# Patient Record
Sex: FEMALE | Race: OTHER | HISPANIC OR LATINO | Employment: PART TIME | URBAN - METROPOLITAN AREA
[De-identification: names, ages, dates, MRNs, and addresses within clinical notes are randomized per-mention and may not be internally consistent; named-entity substitution may affect disease eponyms.]

---

## 2017-06-13 ENCOUNTER — APPOINTMENT (OUTPATIENT)
Dept: LAB | Age: 32
End: 2017-06-13
Attending: PREVENTIVE MEDICINE

## 2017-06-13 ENCOUNTER — TRANSCRIBE ORDERS (OUTPATIENT)
Dept: ADMINISTRATIVE | Age: 32
End: 2017-06-13

## 2017-06-13 DIAGNOSIS — Z00.00 ROUTINE GENERAL MEDICAL EXAMINATION AT A HEALTH CARE FACILITY: ICD-10-CM

## 2017-06-13 DIAGNOSIS — Z00.00 ROUTINE GENERAL MEDICAL EXAMINATION AT A HEALTH CARE FACILITY: Primary | ICD-10-CM

## 2017-06-13 PROCEDURE — 86787 VARICELLA-ZOSTER ANTIBODY: CPT

## 2017-06-13 PROCEDURE — 86480 TB TEST CELL IMMUN MEASURE: CPT

## 2017-06-13 PROCEDURE — 86735 MUMPS ANTIBODY: CPT

## 2017-06-13 PROCEDURE — 86762 RUBELLA ANTIBODY: CPT

## 2017-06-13 PROCEDURE — 36415 COLL VENOUS BLD VENIPUNCTURE: CPT

## 2017-06-13 PROCEDURE — 86765 RUBEOLA ANTIBODY: CPT

## 2017-06-14 LAB — RUBV IGG SERPL IA-ACNC: 69.8 IU/ML

## 2017-06-15 LAB
ANNOTATION COMMENT IMP: NORMAL
GAMMA INTERFERON BACKGROUND BLD IA-ACNC: 0.06 IU/ML
M TB IFN-G BLD-IMP: NEGATIVE
M TB IFN-G CD4+ BCKGRND COR BLD-ACNC: 0 IU/ML
M TB IFN-G CD4+ T-CELLS BLD-ACNC: 0.06 IU/ML
MEV IGG SER QL: ABNORMAL
MITOGEN IGNF BLD-ACNC: 8.28 IU/ML
MUV IGG SER QL: NORMAL
QUANTIFERON-TB GOLD IN TUBE: NORMAL
SERVICE CMNT-IMP: NORMAL
VZV IGG SER IA-ACNC: NORMAL

## 2017-08-17 ENCOUNTER — TRANSCRIBE ORDERS (OUTPATIENT)
Dept: LAB | Facility: CLINIC | Age: 32
End: 2017-08-17

## 2017-08-17 ENCOUNTER — APPOINTMENT (OUTPATIENT)
Dept: LAB | Facility: CLINIC | Age: 32
End: 2017-08-17
Payer: COMMERCIAL

## 2017-08-17 DIAGNOSIS — Z00.8 HEALTH EXAMINATION IN POPULATION SURVEY: Primary | ICD-10-CM

## 2017-08-17 DIAGNOSIS — Z00.8 HEALTH EXAMINATION IN POPULATION SURVEY: ICD-10-CM

## 2017-08-17 LAB
CHOLEST SERPL-MCNC: 137 MG/DL (ref 50–200)
EST. AVERAGE GLUCOSE BLD GHB EST-MCNC: 114 MG/DL
HBA1C MFR BLD: 5.6 % (ref 4.2–6.3)
HDLC SERPL-MCNC: 52 MG/DL (ref 40–60)
LDLC SERPL CALC-MCNC: 76 MG/DL (ref 0–100)
TRIGL SERPL-MCNC: 47 MG/DL

## 2017-08-17 PROCEDURE — 36415 COLL VENOUS BLD VENIPUNCTURE: CPT

## 2017-08-17 PROCEDURE — 80061 LIPID PANEL: CPT

## 2017-08-17 PROCEDURE — 83036 HEMOGLOBIN GLYCOSYLATED A1C: CPT

## 2018-02-27 ENCOUNTER — OFFICE VISIT (OUTPATIENT)
Dept: OBGYN CLINIC | Facility: MEDICAL CENTER | Age: 33
End: 2018-02-27
Payer: COMMERCIAL

## 2018-02-27 VITALS
WEIGHT: 264 LBS | HEIGHT: 66 IN | BODY MASS INDEX: 42.43 KG/M2 | SYSTOLIC BLOOD PRESSURE: 120 MMHG | DIASTOLIC BLOOD PRESSURE: 68 MMHG

## 2018-02-27 DIAGNOSIS — Z12.4 ENCOUNTER FOR PAPANICOLAOU SMEAR FOR CERVICAL CANCER SCREENING: Primary | ICD-10-CM

## 2018-02-27 DIAGNOSIS — Z11.51 SCREENING FOR HPV (HUMAN PAPILLOMAVIRUS): ICD-10-CM

## 2018-02-27 PROCEDURE — 87624 HPV HI-RISK TYP POOLED RSLT: CPT | Performed by: OBSTETRICS & GYNECOLOGY

## 2018-02-27 PROCEDURE — G0145 SCR C/V CYTO,THINLAYER,RESCR: HCPCS | Performed by: OBSTETRICS & GYNECOLOGY

## 2018-02-27 PROCEDURE — 99385 PREV VISIT NEW AGE 18-39: CPT | Performed by: OBSTETRICS & GYNECOLOGY

## 2018-02-27 NOTE — PROGRESS NOTES
ASSESSMENT & PLAN: Chuyita De Los Santos is a 29 y o   with normal gynecologic exam     1   Routine well woman exam done today  2  Pap and HPV:  The patient's last pap was 4 years ago  Pap and cotesting was done today  Current ASCCP Guidelines reviewed  3   The following were reviewed in today's visit: breast self exam and contraception options    CC:  Annual Gynecologic Examination    HPI: Chuyita De Los Santos is a 29 y o   who presents for annual gynecologic examination  She has the following concerns:  None  Had mirena in the past, but did not tolerate side effects of bloating, weight gain and irritablity  Using condoms now   Not interested in future fertility but does not want a tubal    Health Maintenance:   She wears her seatbelt routinely  She does perform regular monthly self breast exams  She feels safe at home  History reviewed  No pertinent past medical history  History reviewed  No pertinent surgical history  Past OB/Gyn History:  OB History      Para Term  AB Living    3 3            SAB TAB Ectopic Multiple Live Births                     Pt does not have menstrual issues  History of sexually transmitted infection: No   History of abnormal pap smears: No       Patient is currently sexually active  The current method of family planning is none  Family History   Problem Relation Age of Onset    No Known Problems Mother     Heart attack Father        Social History:  Social History     Social History    Marital status: Single     Spouse name: N/A    Number of children: N/A    Years of education: N/A     Occupational History    Not on file       Social History Main Topics    Smoking status: Former Smoker    Smokeless tobacco: Never Used    Alcohol use No    Drug use: No    Sexual activity: Yes     Partners: Male     Birth control/ protection: Condom Male     Other Topics Concern    Not on file     Social History Narrative    No narrative on file     Presently lives with family  Patient is   Patient is currently     No Known Allergies    No current outpatient prescriptions on file  Review of Systems  Constitutional :no fever, feels well, no tiredness, no recent weight gain or loss  ENT: no ear ache, no loss of hearing, no nosebleeds or nasal discharge, no sore throat or hoarseness  Cardiovascular: no complaints of slow or fast heart beat, no chest pain, no palpitations, no leg claudication or lower extremity edema  Respiratory: no complaints of shortness of shortness of breath, no GILLESPIE  Breasts:no complaints of breast pain, breast lump, or nipple discharge  Gastrointestinal: no complaints of abdominal pain, constipation,nausea, vomiting, or diarrhea or bloody stools  Genitourinary : no complaints of dysuria, incontinence, pelvic pain, no dysmenorrhea, vaginal discharge or abnormal vaginal bleeding and as noted in HPI    Integumentary: no complaints of skin rash or lesion, itching or dry skin  Neurological: no complaints of headache, no confusion, no numbness or tingling, no dizziness or fainting    Objective      /68   Ht 5' 6" (1 676 m)   Wt 120 kg (264 lb)   LMP 02/15/2018   BMI 42 61 kg/m²   General:   appears stated age, cooperative, alert normal mood and affect   Neck: Neck: normal, supple,trachea midline, no masses   Heart: regular rate and rhythm, S1, S2 normal, no murmur, click, rub or gallop   Lungs: clear to auscultation bilaterally   Breasts: normal appearance, no masses or tenderness, Inspection negative, No nipple retraction or dimpling, No nipple discharge or bleeding, No axillary or supraclavicular adenopathy, Normal to palpation without dominant masses   Abdomen: soft, non-tender, without masses or organomegaly   Vulva: normal, normal female genitalia, Bartholin's, Urethra, Flagler normal, no lesions, normal female hair distribution   Vagina: normal vagina, no discharge, exudate, lesion, or erythema   Urethra: normal   Cervix: Normal, no discharge  PAP done     Uterus: normal size, contour, position, consistency, mobility, non-tender   Adnexa: no mass, fullness, tenderness

## 2018-03-01 LAB — HPV RRNA GENITAL QL NAA+PROBE: NORMAL

## 2018-03-05 LAB
LAB AP GYN PRIMARY INTERPRETATION: NORMAL
Lab: NORMAL

## 2018-09-27 ENCOUNTER — OFFICE VISIT (OUTPATIENT)
Dept: FAMILY MEDICINE CLINIC | Facility: CLINIC | Age: 33
End: 2018-09-27
Payer: COMMERCIAL

## 2018-09-27 VITALS
DIASTOLIC BLOOD PRESSURE: 68 MMHG | SYSTOLIC BLOOD PRESSURE: 110 MMHG | BODY MASS INDEX: 46.54 KG/M2 | WEIGHT: 289.6 LBS | HEIGHT: 66 IN

## 2018-09-27 DIAGNOSIS — Z23 NEED FOR TETANUS, DIPHTHERIA, AND ACELLULAR PERTUSSIS (TDAP) VACCINE: ICD-10-CM

## 2018-09-27 DIAGNOSIS — Z00.00 ENCOUNTER FOR WELL ADULT EXAM WITHOUT ABNORMAL FINDINGS: Primary | ICD-10-CM

## 2018-09-27 DIAGNOSIS — E53.8 VITAMIN B12 DEFICIENCY: ICD-10-CM

## 2018-09-27 DIAGNOSIS — E55.9 VITAMIN D DEFICIENCY: ICD-10-CM

## 2018-09-27 PROCEDURE — 90471 IMMUNIZATION ADMIN: CPT | Performed by: FAMILY MEDICINE

## 2018-09-27 PROCEDURE — 90715 TDAP VACCINE 7 YRS/> IM: CPT | Performed by: FAMILY MEDICINE

## 2018-09-27 PROCEDURE — 99395 PREV VISIT EST AGE 18-39: CPT | Performed by: FAMILY MEDICINE

## 2018-09-27 NOTE — PROGRESS NOTES
Assessment/Plan:     Diagnoses and all orders for this visit:    Encounter for well adult exam without abnormal findings  -     TSH, 3rd generation; Future  -     Comprehensive metabolic panel; Future  -     HEMOGLOBIN A1C W/ EAG ESTIMATION; Future    Vitamin D deficiency  -     Vitamin B12; Future    Vitamin B12 deficiency  -     Vitamin D 25 hydroxy; Future    Need for tetanus, diphtheria, and acellular pertussis (Tdap) vaccine  -     TDAP VACCINE GREATER THAN OR EQUAL TO 6YO IM          Subjective:   Chief Complaint   Patient presents with    SLP Initial Evaluation     new patient here to establish care    Physical Exam      Patient ID: Farshad Driscoll is a 29 y o  female      29year old female employee at [address] [place] [address]        The following portions of the patient's history were reviewed and updated as appropriate: allergies, current medications, past family history, past medical history, past social history, past surgical history and problem list       Review of Systems      Objective:  /68   Ht 5' 6" (1 676 m)   Wt 131 kg (289 lb 9 6 oz)   BMI 46 74 kg/m²        Physical Exam

## 2018-09-28 ENCOUNTER — APPOINTMENT (OUTPATIENT)
Dept: LAB | Age: 33
End: 2018-09-28
Payer: COMMERCIAL

## 2018-09-28 DIAGNOSIS — E03.8 SUBCLINICAL HYPOTHYROIDISM: Primary | ICD-10-CM

## 2018-09-28 DIAGNOSIS — E55.9 VITAMIN D DEFICIENCY: ICD-10-CM

## 2018-09-28 DIAGNOSIS — Z00.00 ENCOUNTER FOR WELL ADULT EXAM WITHOUT ABNORMAL FINDINGS: ICD-10-CM

## 2018-09-28 DIAGNOSIS — E53.8 VITAMIN B12 DEFICIENCY: ICD-10-CM

## 2018-09-28 LAB
25(OH)D3 SERPL-MCNC: 16.8 NG/ML (ref 30–100)
ALBUMIN SERPL BCP-MCNC: 3.5 G/DL (ref 3.5–5)
ALP SERPL-CCNC: 64 U/L (ref 46–116)
ALT SERPL W P-5'-P-CCNC: 34 U/L (ref 12–78)
ANION GAP SERPL CALCULATED.3IONS-SCNC: 7 MMOL/L (ref 4–13)
AST SERPL W P-5'-P-CCNC: 18 U/L (ref 5–45)
BILIRUB SERPL-MCNC: 0.37 MG/DL (ref 0.2–1)
BUN SERPL-MCNC: 10 MG/DL (ref 5–25)
CALCIUM SERPL-MCNC: 8.9 MG/DL (ref 8.3–10.1)
CHLORIDE SERPL-SCNC: 108 MMOL/L (ref 100–108)
CO2 SERPL-SCNC: 23 MMOL/L (ref 21–32)
CREAT SERPL-MCNC: 0.63 MG/DL (ref 0.6–1.3)
EST. AVERAGE GLUCOSE BLD GHB EST-MCNC: 111 MG/DL
GFR SERPL CREATININE-BSD FRML MDRD: 121 ML/MIN/1.73SQ M
GLUCOSE P FAST SERPL-MCNC: 94 MG/DL (ref 65–99)
HBA1C MFR BLD: 5.5 % (ref 4.2–6.3)
POTASSIUM SERPL-SCNC: 4 MMOL/L (ref 3.5–5.3)
PROT SERPL-MCNC: 7.6 G/DL (ref 6.4–8.2)
SODIUM SERPL-SCNC: 138 MMOL/L (ref 136–145)
TSH SERPL DL<=0.05 MIU/L-ACNC: 3.85 UIU/ML (ref 0.36–3.74)
VIT B12 SERPL-MCNC: 399 PG/ML (ref 100–900)

## 2018-09-28 PROCEDURE — 83036 HEMOGLOBIN GLYCOSYLATED A1C: CPT

## 2018-09-28 PROCEDURE — 82306 VITAMIN D 25 HYDROXY: CPT

## 2018-09-28 PROCEDURE — 80053 COMPREHEN METABOLIC PANEL: CPT

## 2018-09-28 PROCEDURE — 82607 VITAMIN B-12: CPT

## 2018-09-28 PROCEDURE — 84443 ASSAY THYROID STIM HORMONE: CPT

## 2018-09-28 PROCEDURE — 36415 COLL VENOUS BLD VENIPUNCTURE: CPT

## 2018-10-01 ENCOUNTER — TELEPHONE (OUTPATIENT)
Dept: FAMILY MEDICINE CLINIC | Facility: CLINIC | Age: 33
End: 2018-10-01

## 2018-10-01 NOTE — TELEPHONE ENCOUNTER
----- Message from Dina Edward MA sent at 10/1/2018  9:12 AM EDT -----  Regarding: FW: RE: Visit Follow-Up Question  Contact: [phone]  Please advise, thanks  ----- Message -----  From: Lexy Bee  Sent: 9/28/2018   8:59 PM  To: [facility]  Subject: RE: RE: Visit Follow-Up Question                 Yes please if the office can call to set it up I would greatly appreciate it  Thank you, enjoy your weekend     ----- Message -----  From: FRIDA Cordero  Sent: 9/28/18 3:54 PM  To: Lexy Bee  Subject: RE: Visit Follow-Up Question    Julio Cesar Wang,     My name is Ginger Rasmussen, I am the Nurse Practitioner in the office  Dr Nancy Stephens will be back on Monday  I am more than happy to help  It sounds like it would be best to make an appointment with either myself or Dr Nancy Stephens to further discuss  There are plenty of options out there to help with how you're feeling  We can have the office contact you Monday once Dr Green is back to see what would work with her schedule if you'd like? Let me know  ThanksJohn         ----- Message -----     From: Lexy Bee     Sent: 9/28/2018  2:49 PM EDT       To: Mary Ellen Gonzalez DO  Subject: Visit Follow-Up Question    I am a very timid and keep things to myself  My partner really wants me to reach out to bring up that I have been having anxiety, and overwhelming feeling that brings my mood down and I tend to not want to go anywhere or do anything  We want to see if there is anything to help  If I need to set another appointment to discuss about it, please let me know I'll call the office to set up  I apologize in advance for not being honest and bringing this up during our visit  Thank you

## 2018-10-03 ENCOUNTER — APPOINTMENT (OUTPATIENT)
Dept: LAB | Age: 33
End: 2018-10-03
Payer: COMMERCIAL

## 2018-10-03 DIAGNOSIS — E03.8 SUBCLINICAL HYPOTHYROIDISM: ICD-10-CM

## 2018-10-03 LAB
T3FREE SERPL-MCNC: 2.58 PG/ML (ref 2.3–4.2)
T4 FREE SERPL-MCNC: 1.02 NG/DL (ref 0.76–1.46)
TSH SERPL DL<=0.05 MIU/L-ACNC: 4.15 UIU/ML (ref 0.36–3.74)

## 2018-10-03 PROCEDURE — 36415 COLL VENOUS BLD VENIPUNCTURE: CPT

## 2018-10-03 PROCEDURE — 84481 FREE ASSAY (FT-3): CPT

## 2018-10-03 PROCEDURE — 84443 ASSAY THYROID STIM HORMONE: CPT

## 2018-10-03 PROCEDURE — 84439 ASSAY OF FREE THYROXINE: CPT

## 2018-10-09 NOTE — TELEPHONE ENCOUNTER
lmom for pt to call back and schedule an appointment with either Chantelle Novak or Dr Rodriguez Silence

## 2018-10-12 ENCOUNTER — OFFICE VISIT (OUTPATIENT)
Dept: OBGYN CLINIC | Facility: MEDICAL CENTER | Age: 33
End: 2018-10-12
Payer: COMMERCIAL

## 2018-10-12 ENCOUNTER — OFFICE VISIT (OUTPATIENT)
Dept: FAMILY MEDICINE CLINIC | Facility: CLINIC | Age: 33
End: 2018-10-12
Payer: COMMERCIAL

## 2018-10-12 VITALS
WEIGHT: 293 LBS | DIASTOLIC BLOOD PRESSURE: 70 MMHG | HEIGHT: 66 IN | BODY MASS INDEX: 47.09 KG/M2 | SYSTOLIC BLOOD PRESSURE: 124 MMHG

## 2018-10-12 VITALS
DIASTOLIC BLOOD PRESSURE: 60 MMHG | WEIGHT: 293 LBS | HEIGHT: 66 IN | SYSTOLIC BLOOD PRESSURE: 120 MMHG | BODY MASS INDEX: 47.09 KG/M2

## 2018-10-12 DIAGNOSIS — E03.8 SUBCLINICAL HYPOTHYROIDISM: ICD-10-CM

## 2018-10-12 DIAGNOSIS — N64.4 BREAST PAIN, LEFT: Primary | ICD-10-CM

## 2018-10-12 DIAGNOSIS — F41.8 ANXIETY WITH DEPRESSION: Primary | ICD-10-CM

## 2018-10-12 PROCEDURE — 99214 OFFICE O/P EST MOD 30 MIN: CPT | Performed by: NURSE PRACTITIONER

## 2018-10-12 RX ORDER — ESCITALOPRAM OXALATE 5 MG/1
5 TABLET ORAL DAILY
Qty: 30 TABLET | Refills: 0 | Status: SHIPPED | OUTPATIENT
Start: 2018-10-12 | End: 2018-11-01 | Stop reason: SDUPTHER

## 2018-10-12 RX ORDER — DIPHENOXYLATE HYDROCHLORIDE AND ATROPINE SULFATE 2.5; .025 MG/1; MG/1
1 TABLET ORAL DAILY
COMMUNITY
End: 2020-08-06 | Stop reason: ALTCHOICE

## 2018-10-12 NOTE — PATIENT INSTRUCTIONS
Start Lexapro 5mg daily  Call us if you experience any worsening symptoms or no improvement  Monitor for side effects and notify us if you experience these  Repeat thyroid labs in 3 weeks  Follow up in 3 weeks to review  Escitalopram (By mouth)   Escitalopram ([id number])  Treats depression and generalized anxiety disorder (CAR)  Brand Name(s): Lexapro   There may be other brand names for this medicine  When This Medicine Should Not Be Used: This medicine is not right for everyone  Do not use it if you had an allergic reaction to escitalopram or citalopram   How to Use This Medicine:   Liquid, Tablet  · Take this medicine as directed  You may need to take it for a month or more before you feel better  Your dose may need to be changed to find out what works best for you  · Measure the oral liquid medicine with a marked measuring spoon, oral syringe, or medicine cup  · This medicine should come with a Medication Guide  Ask your pharmacist for a copy if you do not have one  · Missed dose: Take a dose as soon as you remember  If it is almost time for your next dose, wait until then and take a regular dose  Do not take extra medicine to make up for a missed dose  · Store the medicine in a closed container at room temperature, away from heat, moisture, and direct light  Drugs and Foods to Avoid:   Ask your doctor or pharmacist before using any other medicine, including over-the-counter medicines, vitamins, and herbal products  · Do not use this medicine together with pimozide  Do not use this medicine and an MAO inhibitor (MAOI) within 14 days of each other  · Some medicines can affect how escitalopram works   Tell your doctor if you are using the following:   ¨ Buspirone, carbamazepine, fentanyl, lithium, Mitzi's wort, tramadol, or tryptophan supplements  ¨ Amphetamines  ¨ Blood thinner (including warfarin)  ¨ Diuretic (water pill)  ¨ NSAID pain or arthritis medicine (including aspirin, celecoxib, diclofenac, ibuprofen, naproxen)  ¨ Triptan medicine to treat migraine headaches (including sumatriptan)  · Tell your doctor if you use anything else that makes you sleepy  Some examples are allergy medicine, narcotic pain medicine, and alcohol  · Do not drink alcohol while you are using this medicine  Warnings While Using This Medicine:   · Tell your doctor if you are pregnant or breastfeeding, or if you have kidney disease, liver disease, bleeding problems, glaucoma, heart disease, or a seizure disorder  · For some children, teenagers, and young adults, this medicine may increase mental or emotional problems  This may lead to thoughts of suicide and violence  Talk with your doctor right away if you have any thoughts or behavior changes that concern you  Tell your doctor if you or anyone in your family has a history of bipolar disorder or suicide attempts  · This medicine may cause the following problems:   ¨ Serotonin syndrome (more likely when taken with certain medicines)  ¨ Low sodium levels  ¨ Increased risk of bleeding problems  · This medicine may make you dizzy or drowsy  Do not drive or do anything that could be dangerous until you know how this medicine affects you  · Your doctor may want to monitor your child's weight and height, because this medicine may cause decreased appetite and weight loss in children  · Do not stop using this medicine suddenly  Your doctor will need to slowly decrease your dose before you stop it completely  · Your doctor will check your progress and the effects of this medicine at regular visits  Keep all appointments  · Keep all medicine out of the reach of children  Never share your medicine with anyone    Possible Side Effects While Using This Medicine:   Call your doctor right away if you notice any of these side effects:  · Allergic reaction: Itching or hives, swelling in your face or hands, swelling or tingling in your mouth or throat, chest tightness, trouble breathing  · Anxiety, restlessness, fever, sweating, muscle spasms, nausea, vomiting, diarrhea, seeing or hearing things that are not there  · Confusion, weakness, and muscle twitching  · Eye pain, vision changes, seeing halos around lights  · Fast, pounding, or uneven heartbeat  · Feeling more excited or energetic than usual, racing thoughts, trouble sleeping  · Seizures  · Thoughts of hurting yourself or others, unusual behavior  · Unusual bleeding or bruising  If you notice these less serious side effects, talk with your doctor:   · Dizziness, drowsiness, or sleepiness  · Dry mouth  · Headache  · Nausea, constipation, diarrhea  · Sexual problems  If you notice other side effects that you think are caused by this medicine, tell your doctor  Call your doctor for medical advice about side effects  You may report side effects to FDA at 7-627-FDA-8665  © 2017 [employer] Information is for End User's use only and may not be sold, redistributed or otherwise used for commercial purposes  The above information is an  only  It is not intended as medical advice for individual conditions or treatments  Talk to your doctor, nurse or pharmacist before following any medical regimen to see if it is safe and effective for you

## 2018-10-12 NOTE — ASSESSMENT & PLAN NOTE
After thorough discussion with patient regarding treatment plan and medication options, will start with 5 mg lexapro daily  Side effects and med administration educated  Call if she experiences any side effects  Follow up in 3 weeks

## 2018-10-12 NOTE — PROGRESS NOTES
Assessment/Plan:    Anxiety with depression  After thorough discussion with patient regarding treatment plan and medication options, will start with 5 mg lexapro daily  Side effects and med administration educated  Call if she experiences any side effects  Follow up in 2 weeks  Subclinical hypothyroidism  TSH worsened but T3 and T4 still in range  Patient wishes to continue to monitor before starting medication  Will repeat in 4-6 weeks and follow up  Follow up 3 weeks for medication efficacy evaluation and possible titration if needed  Diagnoses and all orders for this visit:    Anxiety with depression  -     escitalopram (LEXAPRO) 5 mg tablet; Take 1 tablet (5 mg total) by mouth daily    Subclinical hypothyroidism  -     TSH, 3rd generation; Future  -     T3, free; Future  -     T4, free; Future      Patient verbalizes understand and agrees with treatment plan  Subjective:        Patient ID: Sergo iXe is a 29 y o  female  Chief Complaint   Patient presents with    Anxiety     pt states she has had anxiety for years, never saw a doctor for this situation  states it has been getting worse  some days she just wants to stay home and socialize with others   Review Labs     thyroid from 10/03/2018         Patient presents to office today to discuss anxiety  Patient states that she has had anxiety for years but she has never seen a provider for this  She states that has been getting worse recently and Sunday she just wants  To stay at home and not socialize  Patient denies any suicidal or homicidal ideations  Patient has thought for while about treating anxiety  She has tried a lot of lifestyle changes and strategies at home to help with anxiety  Patient also like to review lab work that was recently done October  Patient states that she is overwhelmed very easily  She states she has 3 kids and very busy schedule and works evening shift    She states that she knows that food intake has a lot to do with her anxiety and depression  She states since her mood has been decreased she has been eating a lot more in the waking his come back after she recently had substantial weight loss and she does not want to get worse  She states over the past 2 years she thinks she had 2 anxiety attacks  Anxiety   Presents for initial visit  Onset was 1 to 5 years ago  The problem has been gradually worsening  Symptoms include depressed mood, excessive worry, insomnia, nervous/anxious behavior, obsessions and panic  Patient reports no chest pain, compulsions, confusion, feeling of choking, muscle tension, shortness of breath or suicidal ideas  Symptoms occur most days  The severity of symptoms is moderate and interfering with daily activities  The quality of sleep is fair  The following portions of the patient's history were reviewed and updated as appropriate: allergies, current medications, past family history, past social history and problem list     Review of Systems   Respiratory: Negative for shortness of breath  Cardiovascular: Negative for chest pain  Psychiatric/Behavioral: Negative for confusion and suicidal ideas  The patient is nervous/anxious and has insomnia  Objective:  /70 (BP Location: Left arm, Patient Position: Sitting, Cuff Size: Large)   Ht 5' 6" (1 676 m)   Wt 133 kg (293 lb)   LMP 09/23/2018   BMI 47 29 kg/m²      Physical Exam   Constitutional: She is oriented to person, place, and time  She appears well-developed  No distress  Morbidly obese   HENT:   Head: Normocephalic and atraumatic  Right Ear: External ear normal    Left Ear: External ear normal    Nose: Nose normal    Eyes: Conjunctivae and lids are normal  Right eye exhibits no discharge  Left eye exhibits no discharge  Neck: Normal range of motion  Neck supple  No tracheal deviation present  Cardiovascular: Normal rate and regular rhythm      No murmur heard   Pulmonary/Chest: Effort normal and breath sounds normal  No respiratory distress  She has no wheezes  Abdominal: Soft  Bowel sounds are normal  She exhibits no distension  There is no tenderness  There is no guarding  Musculoskeletal: Normal range of motion  She exhibits no edema or deformity  Lymphadenopathy:     She has no cervical adenopathy  Neurological: She is alert and oriented to person, place, and time  Coordination normal    Skin: Skin is warm and dry  No rash noted  She is not diaphoretic  No erythema  Psychiatric: Her speech is normal and behavior is normal  Judgment and thought content normal  Her mood appears anxious  Cognition and memory are normal  She expresses no homicidal and no suicidal ideation  She expresses no suicidal plans and no homicidal plans  Nursing note and vitals reviewed

## 2018-10-12 NOTE — ASSESSMENT & PLAN NOTE
TSH worsened but T3 and T4 still in range  Patient wishes to continue to monitor before starting medication  Will repeat in 4-6 weeks and follow up

## 2018-10-15 ENCOUNTER — HOSPITAL ENCOUNTER (OUTPATIENT)
Dept: ULTRASOUND IMAGING | Facility: CLINIC | Age: 33
Discharge: HOME | End: 2018-10-15
Payer: COMMERCIAL

## 2018-10-15 DIAGNOSIS — N64.4 BREAST PAIN, LEFT: ICD-10-CM

## 2018-10-15 PROCEDURE — 76642 ULTRASOUND BREAST LIMITED: CPT

## 2018-10-30 ENCOUNTER — LAB (OUTPATIENT)
Dept: LAB | Facility: HOSPITAL | Age: 33
End: 2018-10-30
Payer: COMMERCIAL

## 2018-10-30 DIAGNOSIS — E03.8 SUBCLINICAL HYPOTHYROIDISM: ICD-10-CM

## 2018-10-30 LAB
T3FREE SERPL-MCNC: 2.68 PG/ML (ref 2.3–4.2)
T4 FREE SERPL-MCNC: 1.11 NG/DL (ref 0.76–1.46)
TSH SERPL DL<=0.05 MIU/L-ACNC: 6.43 UIU/ML (ref 0.36–3.74)

## 2018-10-30 PROCEDURE — 84439 ASSAY OF FREE THYROXINE: CPT

## 2018-10-30 PROCEDURE — 84481 FREE ASSAY (FT-3): CPT

## 2018-10-30 PROCEDURE — 84443 ASSAY THYROID STIM HORMONE: CPT

## 2018-10-30 PROCEDURE — 36415 COLL VENOUS BLD VENIPUNCTURE: CPT

## 2018-11-01 ENCOUNTER — OFFICE VISIT (OUTPATIENT)
Dept: FAMILY MEDICINE CLINIC | Facility: CLINIC | Age: 33
End: 2018-11-01
Payer: COMMERCIAL

## 2018-11-01 VITALS
BODY MASS INDEX: 46.9 KG/M2 | WEIGHT: 291.8 LBS | DIASTOLIC BLOOD PRESSURE: 76 MMHG | SYSTOLIC BLOOD PRESSURE: 122 MMHG | HEIGHT: 66 IN

## 2018-11-01 DIAGNOSIS — F41.8 ANXIETY WITH DEPRESSION: Primary | ICD-10-CM

## 2018-11-01 DIAGNOSIS — E03.8 SUBCLINICAL HYPOTHYROIDISM: ICD-10-CM

## 2018-11-01 DIAGNOSIS — E66.01 MORBID OBESITY (HCC): ICD-10-CM

## 2018-11-01 DIAGNOSIS — Z23 NEED FOR INFLUENZA VACCINATION: ICD-10-CM

## 2018-11-01 PROCEDURE — 90686 IIV4 VACC NO PRSV 0.5 ML IM: CPT | Performed by: NURSE PRACTITIONER

## 2018-11-01 PROCEDURE — 90471 IMMUNIZATION ADMIN: CPT | Performed by: NURSE PRACTITIONER

## 2018-11-01 PROCEDURE — 3008F BODY MASS INDEX DOCD: CPT | Performed by: NURSE PRACTITIONER

## 2018-11-01 PROCEDURE — 99214 OFFICE O/P EST MOD 30 MIN: CPT | Performed by: NURSE PRACTITIONER

## 2018-11-01 RX ORDER — ESCITALOPRAM OXALATE 5 MG/1
5 TABLET ORAL DAILY
Qty: 90 TABLET | Refills: 0 | Status: SHIPPED | OUTPATIENT
Start: 2018-11-01 | End: 2019-02-14 | Stop reason: SDUPTHER

## 2018-11-01 NOTE — ASSESSMENT & PLAN NOTE
TSH worsening to 6 but T4 and T3 within normal range  T4 starting to slowly increase  Patient asymptomatic  Will continue to monitor and will recheck in 1 month along with thyroid antibodies  Will also check thyroid US  Hypothyroidism symptoms educated to patient and to call if she experiences any or they worsen

## 2018-11-01 NOTE — ASSESSMENT & PLAN NOTE
Patient having a lot of relief since starting Lexapro 5 mg daily  Less anxious, irritable, more patience, and more energy/motivation  Does not wish to increase dose  Will continue with 5 mg  Refills sent to pharmacy  Call us if you experience any worsening symptoms or no improvement

## 2018-11-01 NOTE — PATIENT INSTRUCTIONS
Complete blood work in 4-6 weeks  Call to schedule ultrasound  Continue with Lexapro 5mg  Call if anything changes  Call us if you experience any worsening symptoms or no improvement  Follow up with Dr Kaitlynn Kendall in 3-4 months

## 2018-11-01 NOTE — PROGRESS NOTES
Assessment/Plan:    Anxiety with depression  Patient having a lot of relief since starting Lexapro 5 mg daily  Less anxious, irritable, more patience, and more energy/motivation  Does not wish to increase dose  Will continue with 5 mg  Refills sent to pharmacy  Call us if you experience any worsening symptoms or no improvement  Subclinical hypothyroidism  TSH worsening to 6 but T4 and T3 within normal range  T4 starting to slowly increase  Patient asymptomatic  Will continue to monitor and will recheck in 1 month along with thyroid antibodies  Will also check thyroid US  Hypothyroidism symptoms educated to patient and to call if she experiences any or they worsen  Morbid obesity ([facility] [address] )  Continue with diet and exercise lifestyle changes  Will continue to monitor  Will hopefully improve with better mood/motivation from lexapro  Flu vaccine given at visit  Follow up in 3 months with Dr Torres Ferro  Diagnoses and all orders for this visit:    Anxiety with depression  -     escitalopram (LEXAPRO) 5 mg tablet; Take 1 tablet (5 mg total) by mouth daily    Subclinical hypothyroidism  -     TSH, 3rd generation; Future  -     T4, free; Future  -     T3, free; Future  -     Thyroid Antibodies Panel; Future  -     US thyroid; Future    [facility]    Need for influenza vaccination  -     SYRINGE/SINGLE-DOSE VIAL: influenza vaccine, 4572-5612, quadrivalent, 0 5 mL, preservative-free, for patients 3+ yr (FLUZONE)      Patient verbalizes understand and agrees with treatment plan  Subjective:        Patient ID: Bertha Hewitt is a 29 y o  female  Chief Complaint   Patient presents with    Follow-up     follow up for anxiety, lab review   Flu Vaccine     Patient would like a flu vaccine today as well  Patient presents to office today for follow-up after recently starting 5 mg Lexapro daily    Patient also came to follow up to review recent thyroid function test   In the past TSH has been hypothyroid but T3 and T4 within normal range  This time again her TSH has worsened to now 6 and T3-T4 still within range  Denies symptoms of hypothyroidism  Possible depression/fatigue from this but this has improved with lexapro  Has noticed large improvement from starting lexapro, she states it takes the edge off with her anxiety and makes her less irritable, more patient, and more motivated  She's not tired anymore  The following portions of the patient's history were reviewed and updated as appropriate: allergies, current medications, past family history, past social history and problem list     Review of Systems   Constitutional: Negative for chills and fever  HENT: Negative for congestion  Eyes: Negative for pain and visual disturbance  Respiratory: Negative for cough and shortness of breath  Cardiovascular: Negative for chest pain, palpitations and leg swelling  Gastrointestinal: Negative for abdominal pain, diarrhea, nausea and vomiting  Genitourinary: Negative for difficulty urinating and dysuria  Musculoskeletal: Negative for arthralgias and myalgias  Skin: Negative for color change and rash  Neurological: Negative for dizziness, syncope, numbness and headaches  Hematological: Negative for adenopathy  Psychiatric/Behavioral: Negative for agitation and behavioral problems  The patient is not nervous/anxious  Objective:  /76 (BP Location: Right arm, Patient Position: Sitting, Cuff Size: Large)   Ht 5' 6" (1 676 m)   Wt 132 kg (291 lb 12 8 oz)   BMI 47 10 kg/m²      Physical Exam   Constitutional: She is oriented to person, place, and time  She appears well-developed  No distress  Morbidly obese   HENT:   Head: Normocephalic and atraumatic  Right Ear: External ear normal    Left Ear: External ear normal    Nose: Nose normal    Eyes: Conjunctivae and lids are normal  Right eye exhibits no discharge  Left eye exhibits no discharge  Neck: Normal range of motion  Neck supple  No tracheal deviation present  Cardiovascular: Normal rate and regular rhythm  No murmur heard  Pulmonary/Chest: Effort normal and breath sounds normal  No respiratory distress  She has no wheezes  Abdominal: Soft  Bowel sounds are normal  She exhibits no distension  There is no tenderness  There is no guarding  Musculoskeletal: Normal range of motion  She exhibits no edema, tenderness or deformity  Lymphadenopathy:     She has no cervical adenopathy  Neurological: She is alert and oriented to person, place, and time  Coordination normal    Skin: Skin is warm and dry  No rash noted  She is not diaphoretic  No erythema  Psychiatric: She has a normal mood and affect  Her speech is normal and behavior is normal  Judgment and thought content normal  Cognition and memory are normal    Nursing note and vitals reviewed

## 2018-11-01 NOTE — ASSESSMENT & PLAN NOTE
Continue with diet and exercise lifestyle changes  Will continue to monitor  Will hopefully improve with better mood/motivation from lexapro

## 2018-11-02 ENCOUNTER — HOSPITAL ENCOUNTER (OUTPATIENT)
Dept: ULTRASOUND IMAGING | Facility: HOSPITAL | Age: 33
Discharge: HOME | End: 2018-11-02
Payer: COMMERCIAL

## 2018-11-02 DIAGNOSIS — E03.8 SUBCLINICAL HYPOTHYROIDISM: ICD-10-CM

## 2018-11-02 PROCEDURE — 76536 US EXAM OF HEAD AND NECK: CPT

## 2018-11-04 DIAGNOSIS — E04.2 MULTIPLE THYROID NODULES: Primary | ICD-10-CM

## 2018-11-06 DIAGNOSIS — E03.8 SUBCLINICAL HYPOTHYROIDISM: Primary | ICD-10-CM

## 2018-11-13 ENCOUNTER — HOSPITAL ENCOUNTER (OUTPATIENT)
Dept: RADIOLOGY | Facility: HOSPITAL | Age: 33
Discharge: HOME | End: 2018-11-13
Admitting: RADIOLOGY
Payer: COMMERCIAL

## 2018-11-13 DIAGNOSIS — E04.2 MULTIPLE THYROID NODULES: ICD-10-CM

## 2018-11-13 PROCEDURE — 88172 CYTP DX EVAL FNA 1ST EA SITE: CPT | Performed by: PATHOLOGY

## 2018-11-13 PROCEDURE — 88173 CYTOPATH EVAL FNA REPORT: CPT | Performed by: PATHOLOGY

## 2018-11-13 PROCEDURE — 76942 ECHO GUIDE FOR BIOPSY: CPT

## 2018-11-13 PROCEDURE — 10022 HB FNA W/IMAGE: CPT

## 2018-11-13 RX ORDER — LIDOCAINE HYDROCHLORIDE 10 MG/ML
2 INJECTION, SOLUTION INFILTRATION; PERINEURAL ONCE
Status: COMPLETED | OUTPATIENT
Start: 2018-11-13 | End: 2018-11-13

## 2018-11-13 RX ADMIN — LIDOCAINE HYDROCHLORIDE 2 ML: 10 INJECTION, SOLUTION INFILTRATION; PERINEURAL at 11:30

## 2018-11-15 DIAGNOSIS — E04.1 THYROID NODULE: Primary | ICD-10-CM

## 2018-12-28 ENCOUNTER — APPOINTMENT (OUTPATIENT)
Dept: LAB | Age: 33
End: 2018-12-28
Payer: COMMERCIAL

## 2018-12-28 DIAGNOSIS — E03.8 SUBCLINICAL HYPOTHYROIDISM: ICD-10-CM

## 2018-12-28 LAB
T3FREE SERPL-MCNC: 2.29 PG/ML (ref 2.3–4.2)
T4 FREE SERPL-MCNC: 0.89 NG/DL (ref 0.76–1.46)
TSH SERPL DL<=0.05 MIU/L-ACNC: 4.73 UIU/ML (ref 0.36–3.74)

## 2018-12-28 PROCEDURE — 36415 COLL VENOUS BLD VENIPUNCTURE: CPT

## 2018-12-28 PROCEDURE — 84443 ASSAY THYROID STIM HORMONE: CPT

## 2018-12-28 PROCEDURE — 84482 T3 REVERSE: CPT

## 2018-12-28 PROCEDURE — 86800 THYROGLOBULIN ANTIBODY: CPT

## 2018-12-28 PROCEDURE — 86376 MICROSOMAL ANTIBODY EACH: CPT

## 2018-12-28 PROCEDURE — 84439 ASSAY OF FREE THYROXINE: CPT

## 2018-12-28 PROCEDURE — 84481 FREE ASSAY (FT-3): CPT

## 2018-12-29 LAB
THYROGLOB AB SERPL-ACNC: 437.1 IU/ML (ref 0–0.9)
THYROPEROXIDASE AB SERPL-ACNC: >600 IU/ML (ref 0–34)

## 2018-12-31 ENCOUNTER — TELEPHONE (OUTPATIENT)
Dept: FAMILY MEDICINE CLINIC | Facility: CLINIC | Age: 33
End: 2018-12-31

## 2018-12-31 DIAGNOSIS — R94.6 ABNORMAL THYROID FUNCTION TEST: ICD-10-CM

## 2018-12-31 DIAGNOSIS — R76.8 THYROID ANTIBODY POSITIVE: Primary | ICD-10-CM

## 2019-01-01 LAB — T3REVERSE SERPL-MCNC: 19.3 NG/DL (ref 9.2–24.1)

## 2019-02-14 DIAGNOSIS — F41.8 ANXIETY WITH DEPRESSION: ICD-10-CM

## 2019-02-14 RX ORDER — ESCITALOPRAM OXALATE 5 MG/1
5 TABLET ORAL DAILY
Qty: 90 TABLET | Refills: 0 | Status: SHIPPED | OUTPATIENT
Start: 2019-02-14 | End: 2019-10-22

## 2019-02-25 ENCOUNTER — CONSULT (OUTPATIENT)
Dept: ENDOCRINOLOGY | Facility: CLINIC | Age: 34
End: 2019-02-25
Payer: COMMERCIAL

## 2019-02-25 VITALS
WEIGHT: 293 LBS | HEART RATE: 90 BPM | SYSTOLIC BLOOD PRESSURE: 120 MMHG | BODY MASS INDEX: 47.09 KG/M2 | HEIGHT: 66 IN | DIASTOLIC BLOOD PRESSURE: 78 MMHG

## 2019-02-25 DIAGNOSIS — E55.9 VITAMIN D DEFICIENCY: ICD-10-CM

## 2019-02-25 DIAGNOSIS — R94.6 ABNORMAL THYROID FUNCTION TEST: ICD-10-CM

## 2019-02-25 DIAGNOSIS — E06.3 HYPOTHYROIDISM DUE TO HASHIMOTO'S THYROIDITIS: Primary | ICD-10-CM

## 2019-02-25 DIAGNOSIS — E03.8 HYPOTHYROIDISM DUE TO HASHIMOTO'S THYROIDITIS: Primary | ICD-10-CM

## 2019-02-25 DIAGNOSIS — E66.01 MORBID OBESITY (HCC): ICD-10-CM

## 2019-02-25 PROCEDURE — 99244 OFF/OP CNSLTJ NEW/EST MOD 40: CPT | Performed by: INTERNAL MEDICINE

## 2019-02-25 RX ORDER — LEVOTHYROXINE SODIUM 0.07 MG/1
75 TABLET ORAL DAILY
Qty: 30 TABLET | Refills: 3 | Status: SHIPPED | OUTPATIENT
Start: 2019-02-25 | End: 2019-05-30 | Stop reason: SDUPTHER

## 2019-02-25 NOTE — PROGRESS NOTES
Isaac Ferris 34 y o  female MRN: [id number]    Encounter: [id number]      Assessment/Plan     Assessment: This is a 34y o -year-old female with vitamin D deficiency and hypothyroidism  She also has morbid obesity  Plan:  1  Hypothyroidism based on her symptoms as well as elevated TSH along with elevated thyroid antibodies  Start levothyroxine 75 mcg per day  Check TSH and free T4 in six weeks  2  Vitamin-D deficiency-continue replacement  She may need an increased dose of 5000 units twice a day  3  Morbid obesity-continue to monitor  This may improve with addition of levothyroxine  4  Thyroid nodule-she is scheduled for another ultrasound in a few months  CC:   Hypothyroidism    History of Present Illness     HPI:  29-year-old woman with elevated TSH who is here for evaluation of her thyroid  She denies any constipation, diarrhea, skin changes, temperature intolerance, myalgia and arthralgia  There is no family history of thyroid disorder  In the workup of abnormal thyroid testing, she had an ultrasound which showed a left upper pole thyroid nodule  She underwent a fine-needle aspirate that was benign  She does admit to significant weight gain over the past 18 months  Review of Systems   Constitutional: Positive for unexpected weight change  Negative for chills and fever  HENT: Negative for trouble swallowing and voice change  Respiratory: Negative for shortness of breath  Cardiovascular: Negative for chest pain  Gastrointestinal: Negative for constipation, diarrhea, nausea and vomiting  Endocrine: Negative for cold intolerance and heat intolerance  All other systems reviewed and are negative        Historical Information   Past Medical History:   Diagnosis Date    Obesity      Past Surgical History:   Procedure Laterality Date    US GUIDED THYROID BIOPSY  11/13/2018     Social History   Social History     Substance and Sexual Activity   Alcohol Use Yes Comment: socially     Social History     Substance and Sexual Activity   Drug Use No     Social History     Tobacco Use   Smoking Status Former Smoker   Smokeless Tobacco Never Used     Family History:   Family History   Problem Relation Age of Onset    No Known Problems Mother     Heart attack Father     Hypertension Father     Hyperlipidemia Father        Meds/Allergies   Current Outpatient Medications   Medication Sig Dispense Refill    Cholecalciferol (VITAMIN D PO) Take by mouth daily      Cyanocobalamin (B-12 PO) Take by mouth daily      escitalopram (LEXAPRO) 5 mg tablet Take 1 tablet (5 mg total) by mouth daily 90 tablet 0    multivitamin (THERAGRAN) TABS Take 1 tablet by mouth daily       No current facility-administered medications for this visit  No Known Allergies    Objective   Vitals: Blood pressure 120/78, pulse 90, height 5' 6" (1 676 m), weight 134 kg (294 lb 9 6 oz)  Physical Exam   Constitutional: She is oriented to person, place, and time  She appears well-developed and well-nourished  No distress  HENT:   Head: Normocephalic and atraumatic  Mouth/Throat: Oropharynx is clear and moist and mucous membranes are normal  No oropharyngeal exudate  Eyes: Conjunctivae, EOM and lids are normal  Right eye exhibits no discharge  Left eye exhibits no discharge  No scleral icterus  Neck: Neck supple  No thyromegaly present  There is a 2 5 cm left upper pole thyroid nodule present  Cardiovascular: Normal rate, regular rhythm and normal heart sounds  Exam reveals no gallop and no friction rub  No murmur heard  Pulmonary/Chest: Effort normal and breath sounds normal  No respiratory distress  She has no wheezes  Abdominal: Soft  Bowel sounds are normal  She exhibits no distension  There is no tenderness  Musculoskeletal: Normal range of motion  She exhibits no edema, tenderness or deformity  Lymphadenopathy:        Head (right side): No occipital adenopathy present  Head (left side): No occipital adenopathy present  Right: No supraclavicular adenopathy present  Left: No supraclavicular adenopathy present  Neurological: She is alert and oriented to person, place, and time  No cranial nerve deficit  Skin: Skin is warm and intact  No rash noted  She is not diaphoretic  No erythema  Psychiatric: She has a normal mood and affect  Vitals reviewed  The history was obtained from the review of the chart, patient  Lab Results:   Lab Results   Component Value Date/Time    TSH 3RD Emma Slack 4 730 (H) 12/28/2018 01:58 PM    TSH 3RD GENERATON 6 435 (H) 10/30/2018 03:48 PM    TSH 3RD GENERATON 4 150 (H) 10/03/2018 01:07 PM    Free T4 0 89 12/28/2018 01:58 PM    Free T4 1 11 10/30/2018 03:48 PM    Free T4 1 02 10/03/2018 01:07 PM       Imaging Studies:   Results for orders placed during the hospital encounter of 11/02/18   US thyroid    Impression Enlarged heterogeneous thyroid gland  Left side thyroid nodules as described  The larger left upper pole nodule (L1) meets ACR criteria for biopsy which is recommended  There is a region of heterogeneously diminished echogenicity in the upper interpolar right thyroid lobe anteriorly which I am not convinced is a true thyroid nodule  Reevaluation recommended at time of left-sided biopsy or in 6-9 months  Biopsy could   be undertaken if clinically warranted and this appearance persists on the next exam     Reference: ACR Thyroid Imaging, Reporting and Data System (TI-RADS): White Paper of the [employer]  J AM Abilio Radiol 2083;85:917-865  (additional recommendations based on American Thyroid Association 2015 guidelines )      Workstation performed: [id number]         I have personally reviewed pertinent reports  Portions of the record may have been created with voice recognition software   Occasional wrong word or "sound a like" substitutions may have occurred due to the inherent limitations of voice recognition software  Read the chart carefully and recognize, using context, where substitutions have occurred

## 2019-02-25 NOTE — LETTER
February 25, 9653     Osmar Morales DO  [address]    Patient: Jeannene Lesches   YOB: 1985   Date of Visit: 2/25/2019       Dear Dr Silvia Jerry: Thank you for referring Jeannene Lesches to me for evaluation  Below are my notes for this consultation  If you have questions, please do not hesitate to call me  I look forward to following your patient along with you  Sincerely,        Cristóbal Rose MD        CC: No Recipients  Cristóbal Rose MD  2/25/2019 10:05 AM  Sign at close encounter   Jeannene Lesches 34 y o  female MRN: [id number]    Encounter: [id number]      Assessment/Plan     Assessment: This is a 34y o -year-old female with vitamin D deficiency and hypothyroidism  She also has morbid obesity  Plan:  1  Hypothyroidism based on her symptoms as well as elevated TSH along with elevated thyroid antibodies  Start levothyroxine 75 mcg per day  Check TSH and free T4 in six weeks  2  Vitamin-D deficiency-continue replacement  She may need an increased dose of 5000 units twice a day  3  Morbid obesity-continue to monitor  This may improve with addition of levothyroxine  4  Thyroid nodule-she is scheduled for another ultrasound in a few months  CC:   Hypothyroidism    History of Present Illness     HPI:  53-year-old woman with elevated TSH who is here for evaluation of her thyroid  She denies any constipation, diarrhea, skin changes, temperature intolerance, myalgia and arthralgia  There is no family history of thyroid disorder  In the workup of abnormal thyroid testing, she had an ultrasound which showed a left upper pole thyroid nodule  She underwent a fine-needle aspirate that was benign  She does admit to significant weight gain over the past 18 months  Review of Systems   Constitutional: Positive for unexpected weight change  Negative for chills and fever     HENT: Negative for trouble swallowing and voice change  Respiratory: Negative for shortness of breath  Cardiovascular: Negative for chest pain  Gastrointestinal: Negative for constipation, diarrhea, nausea and vomiting  Endocrine: Negative for cold intolerance and heat intolerance  All other systems reviewed and are negative  Historical Information   Past Medical History:   Diagnosis Date    Obesity      Past Surgical History:   Procedure Laterality Date    US GUIDED THYROID BIOPSY  11/13/2018     Social History   Social History     Substance and Sexual Activity   Alcohol Use Yes    Comment: socially     Social History     Substance and Sexual Activity   Drug Use No     Social History     Tobacco Use   Smoking Status Former Smoker   Smokeless Tobacco Never Used     Family History:   Family History   Problem Relation Age of Onset    No Known Problems Mother     Heart attack Father     Hypertension Father     Hyperlipidemia Father        Meds/Allergies   Current Outpatient Medications   Medication Sig Dispense Refill    Cholecalciferol (VITAMIN D PO) Take by mouth daily      Cyanocobalamin (B-12 PO) Take by mouth daily      escitalopram (LEXAPRO) 5 mg tablet Take 1 tablet (5 mg total) by mouth daily 90 tablet 0    multivitamin (THERAGRAN) TABS Take 1 tablet by mouth daily       No current facility-administered medications for this visit  No Known Allergies    Objective   Vitals: Blood pressure 120/78, pulse 90, height 5' 6" (1 676 m), weight 134 kg (294 lb 9 6 oz)  Physical Exam   Constitutional: She is oriented to person, place, and time  She appears well-developed and well-nourished  No distress  HENT:   Head: Normocephalic and atraumatic  Mouth/Throat: Oropharynx is clear and moist and mucous membranes are normal  No oropharyngeal exudate  Eyes: Conjunctivae, EOM and lids are normal  Right eye exhibits no discharge  Left eye exhibits no discharge  No scleral icterus  Neck: Neck supple  No thyromegaly present  There is a 2 5 cm left upper pole thyroid nodule present  Cardiovascular: Normal rate, regular rhythm and normal heart sounds  Exam reveals no gallop and no friction rub  No murmur heard  Pulmonary/Chest: Effort normal and breath sounds normal  No respiratory distress  She has no wheezes  Abdominal: Soft  Bowel sounds are normal  She exhibits no distension  There is no tenderness  Musculoskeletal: Normal range of motion  She exhibits no edema, tenderness or deformity  Lymphadenopathy:        Head (right side): No occipital adenopathy present  Head (left side): No occipital adenopathy present  Right: No supraclavicular adenopathy present  Left: No supraclavicular adenopathy present  Neurological: She is alert and oriented to person, place, and time  No cranial nerve deficit  Skin: Skin is warm and intact  No rash noted  She is not diaphoretic  No erythema  Psychiatric: She has a normal mood and affect  Vitals reviewed  The history was obtained from the review of the chart, patient  Lab Results:   Lab Results   Component Value Date/Time    TSH 3RD Arielle Sages 4 730 (H) 12/28/2018 01:58 PM    TSH 3RD GENERATON 6 435 (H) 10/30/2018 03:48 PM    TSH 3RD GENERATON 4 150 (H) 10/03/2018 01:07 PM    Free T4 0 89 12/28/2018 01:58 PM    Free T4 1 11 10/30/2018 03:48 PM    Free T4 1 02 10/03/2018 01:07 PM       Imaging Studies:   Results for orders placed during the hospital encounter of 11/02/18   US thyroid    Impression Enlarged heterogeneous thyroid gland  Left side thyroid nodules as described  The larger left upper pole nodule (L1) meets ACR criteria for biopsy which is recommended  There is a region of heterogeneously diminished echogenicity in the upper interpolar right thyroid lobe anteriorly which I am not convinced is a true thyroid nodule  Reevaluation recommended at time of left-sided biopsy or in 6-9 months    Biopsy could   be undertaken if clinically warranted and this appearance persists on the next exam     Reference: ACR Thyroid Imaging, Reporting and Data System (TI-RADS): White Paper of the [employer]  J AM Abilio Radiol 1401;03:548-220  (additional recommendations based on American Thyroid Association 2015 guidelines )      Workstation performed: [id number]         I have personally reviewed pertinent reports  Portions of the record may have been created with voice recognition software  Occasional wrong word or "sound a like" substitutions may have occurred due to the inherent limitations of voice recognition software  Read the chart carefully and recognize, using context, where substitutions have occurred

## 2019-02-25 NOTE — PATIENT INSTRUCTIONS
Hypothyroidism   WHAT YOU NEED TO KNOW:   What is hypothyroidism? Hypothyroidism is a condition that develops when the thyroid gland does not make enough thyroid hormone  Thyroid hormones help control body temperature, heart rate, growth, and weight  What causes hypothyroidism? If you have a family member with hypothyroidism, your risk is increased  Any of the following can cause hypothyroidism:  · Autoimmune disease, such as inflammation of your thyroid, or Hashimoto disease    · Surgery, radiation therapy, or medicines such as lithium, sedatives, or narcotics    · Thyroid cancer or viral infection    · Low iodine levels  What are the signs and symptoms of hypothyroidism? The signs and symptoms may develop slowly, sometimes over several years  · Exhaustion    · Sensitivity to cold    · Headaches or decreased concentration    · Muscle aches or weakness    · Constipation     · Dry, flaky skin or brittle nails    · Thinning hair    · Heavy or irregular monthly periods    · Depression or irritability  How is hypothyroidism diagnosed? Your healthcare provider will ask about your symptoms and what medicines you take  He will ask about your medical history and if anyone in your family has hypothyroidism  A blood test will show your thyroid hormone level  How is hypothyroidism treated? Thyroid hormone replacement medicine may bring your thyroid hormone level back to normal  Ask your healthcare provider for more information on other medicines you may need  Call 911 for any of the following:   · You have sudden chest pain or shortness of breath  · You have a seizure  · You feel like you are going to faint  When should I seek immediate care? · You have diarrhea, tremors, or trouble sleeping  · Your legs, ankles, or feet are swollen  When should I contact my healthcare provider? · You have a fever  · You have chills, a cough, or feel weak and achy      · You have pain and swelling in your muscles and joints  · Your skin is itchy, swollen, or you have a rash  · Your signs and symptoms return or get worse, even after treatment  · You have questions or concerns about your condition or care  CARE AGREEMENT:   You have the right to help plan your care  Learn about your health condition and how it may be treated  Discuss treatment options with your caregivers to decide what care you want to receive  You always have the right to refuse treatment  The above information is an  only  It is not intended as medical advice for individual conditions or treatments  Talk to your doctor, nurse or pharmacist before following any medical regimen to see if it is safe and effective for you  © 2017 [address] [employer] [facility] [employer] Information is for End User's use only and may not be sold, redistributed or otherwise used for commercial purposes  All illustrations and images included in CareNotes® are the copyrighted property of A D A M , Inc  or Tyler Romero

## 2019-04-12 ENCOUNTER — LAB (OUTPATIENT)
Dept: LAB | Age: 34
End: 2019-04-12
Payer: COMMERCIAL

## 2019-04-12 DIAGNOSIS — E03.8 HYPOTHYROIDISM DUE TO HASHIMOTO'S THYROIDITIS: ICD-10-CM

## 2019-04-12 DIAGNOSIS — E06.3 HYPOTHYROIDISM DUE TO HASHIMOTO'S THYROIDITIS: ICD-10-CM

## 2019-04-12 LAB
T4 FREE SERPL-MCNC: 1.19 NG/DL (ref 0.76–1.46)
TSH SERPL DL<=0.05 MIU/L-ACNC: 1.21 UIU/ML (ref 0.36–3.74)

## 2019-04-12 PROCEDURE — 36415 COLL VENOUS BLD VENIPUNCTURE: CPT

## 2019-04-12 PROCEDURE — 84439 ASSAY OF FREE THYROXINE: CPT

## 2019-04-12 PROCEDURE — 84443 ASSAY THYROID STIM HORMONE: CPT

## 2019-04-15 ENCOUNTER — TELEPHONE (OUTPATIENT)
Dept: ENDOCRINOLOGY | Facility: CLINIC | Age: 34
End: 2019-04-15

## 2019-05-29 DIAGNOSIS — F41.8 ANXIETY WITH DEPRESSION: ICD-10-CM

## 2019-05-29 DIAGNOSIS — E06.3 HYPOTHYROIDISM DUE TO HASHIMOTO'S THYROIDITIS: ICD-10-CM

## 2019-05-29 DIAGNOSIS — E03.8 HYPOTHYROIDISM DUE TO HASHIMOTO'S THYROIDITIS: ICD-10-CM

## 2019-05-29 RX ORDER — ESCITALOPRAM OXALATE 5 MG/1
5 TABLET ORAL DAILY
Qty: 90 TABLET | Refills: 0 | Status: CANCELLED | OUTPATIENT
Start: 2019-05-29

## 2019-05-29 RX ORDER — LEVOTHYROXINE SODIUM 0.07 MG/1
75 TABLET ORAL DAILY
Qty: 30 TABLET | Refills: 0 | Status: CANCELLED | OUTPATIENT
Start: 2019-05-29

## 2019-05-30 DIAGNOSIS — E03.8 HYPOTHYROIDISM DUE TO HASHIMOTO'S THYROIDITIS: ICD-10-CM

## 2019-05-30 DIAGNOSIS — E06.3 HYPOTHYROIDISM DUE TO HASHIMOTO'S THYROIDITIS: ICD-10-CM

## 2019-05-30 RX ORDER — LEVOTHYROXINE SODIUM 0.07 MG/1
75 TABLET ORAL DAILY
Qty: 30 TABLET | Refills: 5 | Status: SHIPPED | OUTPATIENT
Start: 2019-05-30 | End: 2019-06-03

## 2019-05-30 RX ORDER — ESCITALOPRAM OXALATE 10 MG/1
10 TABLET ORAL DAILY
Qty: 90 TABLET | Refills: 1 | Status: SHIPPED | OUTPATIENT
Start: 2019-05-30 | End: 2019-10-22

## 2019-06-03 RX ORDER — LEVOTHYROXINE SODIUM 0.07 MG/1
TABLET ORAL
Qty: 90 TABLET | Refills: 0 | Status: SHIPPED | OUTPATIENT
Start: 2019-06-03 | End: 2019-08-26 | Stop reason: SDUPTHER

## 2019-06-13 ENCOUNTER — HOSPITAL ENCOUNTER (OUTPATIENT)
Dept: ULTRASOUND IMAGING | Facility: HOSPITAL | Age: 34
Discharge: HOME | End: 2019-06-13
Payer: COMMERCIAL

## 2019-06-13 DIAGNOSIS — E04.1 THYROID NODULE: ICD-10-CM

## 2019-06-13 PROCEDURE — 76536 US EXAM OF HEAD AND NECK: CPT

## 2019-08-07 ENCOUNTER — TELEPHONE (OUTPATIENT)
Dept: FAMILY MEDICINE CLINIC | Facility: CLINIC | Age: 34
End: 2019-08-07

## 2019-08-07 ENCOUNTER — TRANSCRIBE ORDERS (OUTPATIENT)
Dept: FAMILY MEDICINE CLINIC | Facility: CLINIC | Age: 34
End: 2019-08-07

## 2019-08-07 DIAGNOSIS — Z00.8 EXAM FOR POPULATION SURVEY: Primary | ICD-10-CM

## 2019-08-07 NOTE — TELEPHONE ENCOUNTER
Left message for patient to return call to discuss lipid profile needed for insurance incentive program and form

## 2019-08-08 ENCOUNTER — APPOINTMENT (OUTPATIENT)
Dept: LAB | Facility: HOSPITAL | Age: 34
End: 2019-08-08
Payer: COMMERCIAL

## 2019-08-08 DIAGNOSIS — Z00.8 EXAM FOR POPULATION SURVEY: ICD-10-CM

## 2019-08-08 LAB
CHOLEST SERPL-MCNC: 137 MG/DL (ref 50–200)
EST. AVERAGE GLUCOSE BLD GHB EST-MCNC: 108 MG/DL
HBA1C MFR BLD: 5.4 % (ref 4.2–6.3)
HDLC SERPL-MCNC: 54 MG/DL (ref 40–60)
LDLC SERPL CALC-MCNC: 71 MG/DL (ref 0–100)
NONHDLC SERPL-MCNC: 83 MG/DL
TRIGL SERPL-MCNC: 58 MG/DL

## 2019-08-08 PROCEDURE — 80061 LIPID PANEL: CPT

## 2019-08-08 PROCEDURE — 83036 HEMOGLOBIN GLYCOSYLATED A1C: CPT

## 2019-08-08 PROCEDURE — 36415 COLL VENOUS BLD VENIPUNCTURE: CPT

## 2019-08-21 ENCOUNTER — TELEPHONE (OUTPATIENT)
Dept: ENDOCRINOLOGY | Facility: CLINIC | Age: 34
End: 2019-08-21

## 2019-08-21 DIAGNOSIS — E06.3 HYPOTHYROIDISM DUE TO HASHIMOTO'S THYROIDITIS: Primary | ICD-10-CM

## 2019-08-21 DIAGNOSIS — E03.8 HYPOTHYROIDISM DUE TO HASHIMOTO'S THYROIDITIS: Primary | ICD-10-CM

## 2019-08-21 DIAGNOSIS — E55.9 VITAMIN D DEFICIENCY: ICD-10-CM

## 2019-08-21 NOTE — TELEPHONE ENCOUNTER
Patient called and wanted to know if she can have lab done prior to appointment on Monday  If so, what should be ordered?

## 2019-08-22 ENCOUNTER — LAB (OUTPATIENT)
Dept: LAB | Facility: HOSPITAL | Age: 34
End: 2019-08-22
Attending: INTERNAL MEDICINE
Payer: COMMERCIAL

## 2019-08-22 DIAGNOSIS — E06.3 HYPOTHYROIDISM DUE TO HASHIMOTO'S THYROIDITIS: ICD-10-CM

## 2019-08-22 DIAGNOSIS — E03.8 HYPOTHYROIDISM DUE TO HASHIMOTO'S THYROIDITIS: ICD-10-CM

## 2019-08-22 DIAGNOSIS — E55.9 VITAMIN D DEFICIENCY: ICD-10-CM

## 2019-08-22 LAB
25(OH)D3 SERPL-MCNC: 18.9 NG/ML (ref 30–100)
T4 FREE SERPL-MCNC: 1.26 NG/DL (ref 0.76–1.46)
TSH SERPL DL<=0.05 MIU/L-ACNC: 1.86 UIU/ML (ref 0.36–3.74)

## 2019-08-22 PROCEDURE — 84439 ASSAY OF FREE THYROXINE: CPT

## 2019-08-22 PROCEDURE — 84443 ASSAY THYROID STIM HORMONE: CPT

## 2019-08-22 PROCEDURE — 36415 COLL VENOUS BLD VENIPUNCTURE: CPT

## 2019-08-22 PROCEDURE — 82306 VITAMIN D 25 HYDROXY: CPT

## 2019-08-26 ENCOUNTER — OFFICE VISIT (OUTPATIENT)
Dept: ENDOCRINOLOGY | Facility: CLINIC | Age: 34
End: 2019-08-26
Payer: COMMERCIAL

## 2019-08-26 VITALS
WEIGHT: 293 LBS | SYSTOLIC BLOOD PRESSURE: 112 MMHG | HEART RATE: 64 BPM | DIASTOLIC BLOOD PRESSURE: 70 MMHG | HEIGHT: 66 IN | BODY MASS INDEX: 47.09 KG/M2

## 2019-08-26 DIAGNOSIS — E55.9 VITAMIN D DEFICIENCY: ICD-10-CM

## 2019-08-26 DIAGNOSIS — E04.2 MULTIPLE THYROID NODULES: ICD-10-CM

## 2019-08-26 DIAGNOSIS — E06.3 HYPOTHYROIDISM DUE TO HASHIMOTO'S THYROIDITIS: Primary | ICD-10-CM

## 2019-08-26 DIAGNOSIS — E66.01 MORBID OBESITY (HCC): ICD-10-CM

## 2019-08-26 DIAGNOSIS — E03.8 HYPOTHYROIDISM DUE TO HASHIMOTO'S THYROIDITIS: Primary | ICD-10-CM

## 2019-08-26 PROCEDURE — 99214 OFFICE O/P EST MOD 30 MIN: CPT | Performed by: PHYSICIAN ASSISTANT

## 2019-08-26 RX ORDER — ERGOCALCIFEROL (VITAMIN D2) 1250 MCG
50000 CAPSULE ORAL WEEKLY
Qty: 12 CAPSULE | Refills: 1 | Status: SHIPPED | OUTPATIENT
Start: 2019-08-26 | End: 2020-03-04 | Stop reason: SDUPTHER

## 2019-08-26 RX ORDER — LEVOTHYROXINE SODIUM 0.07 MG/1
75 TABLET ORAL DAILY
Qty: 90 TABLET | Refills: 1 | Status: SHIPPED | OUTPATIENT
Start: 2019-08-26 | End: 2020-03-04 | Stop reason: SDUPTHER

## 2019-08-26 NOTE — PROGRESS NOTES
Established Patient Progress Note       Chief Complaint   Patient presents with    Hypothyroidism        History of Present Illness:     Martine Timmons is a 34 y o  female with a history of thyroid nodule, hypothyroidism, vitamin D Deficiency, and Obesity  For hypothyroidism, she is taking levothyroxine 75mcg daily and properly on an empty stomach  She has had weight gain reports diet has not been very good until she started meal planning about 4 weeks ago  Eating better, planning exercise now that kid are back in school  For the thyroid nodules, she denies dysphagia  She did have a thyroid ultrasound 6/12/2019 was stable  She did have FNA 11/13/2018 which showed no malignancy  For Vitamin D Deficiency, has been forgetting to taking supplements most of the time       Patient Active Problem List   Diagnosis    Anxiety with depression    Morbid obesity ([address] )    Hypothyroidism due to Hashimoto's thyroiditis    Multiple thyroid nodules    Vitamin D deficiency      Past Medical History:   Diagnosis Date    Obesity       Past Surgical History:   Procedure Laterality Date    US GUIDED THYROID BIOPSY  11/13/2018      Family History   Problem Relation Age of Onset    No Known Problems Mother     Heart attack Father     Hypertension Father     Hyperlipidemia Father      Social History     Tobacco Use    Smoking status: Former Smoker    Smokeless tobacco: Never Used   Substance Use Topics    Alcohol use: Yes     Comment: socially     No Known Allergies    Current Outpatient Medications:     Cyanocobalamin (B-12 PO), Take by mouth daily, Disp: , Rfl:     escitalopram (LEXAPRO) 10 mg tablet, Take 1 tablet (10 mg total) by mouth daily, Disp: 90 tablet, Rfl: 1    levothyroxine 75 mcg tablet, Take 1 tablet (75 mcg total) by mouth daily, Disp: 90 tablet, Rfl: 1    multivitamin (THERAGRAN) TABS, Take 1 tablet by mouth daily, Disp: , Rfl:     ergocalciferol (ERGOCALCIFEROL) 05345 units capsule, Take 1 capsule (50,000 Units total) by mouth once a week, Disp: 12 capsule, Rfl: 1    escitalopram (LEXAPRO) 5 mg tablet, Take 1 tablet (5 mg total) by mouth daily (Patient not taking: Reported on 8/26/2019), Disp: 90 tablet, Rfl: 0    Review of Systems   Constitutional: Negative for activity change, appetite change, chills, diaphoresis, fatigue, fever and unexpected weight change  HENT: Negative for trouble swallowing and voice change  Eyes: Negative for visual disturbance  Respiratory: Negative for shortness of breath  Cardiovascular: Negative for chest pain and palpitations  Gastrointestinal: Negative for abdominal pain, constipation and diarrhea  Endocrine: Negative for cold intolerance, heat intolerance, polydipsia, polyphagia and polyuria  Genitourinary: Negative for frequency and menstrual problem  Musculoskeletal: Negative for arthralgias and myalgias  Skin: Negative for rash  Allergic/Immunologic: Negative for food allergies  Neurological: Negative for dizziness and tremors  Hematological: Negative for adenopathy  Psychiatric/Behavioral: Negative for sleep disturbance  All other systems reviewed and are negative  Physical Exam:  Body mass index is 49 91 kg/m²  /70   Pulse 64   Ht 5' 6" (1 676 m)   Wt (!) 140 kg (309 lb 3 2 oz)   BMI 49 91 kg/m²    Wt Readings from Last 3 Encounters:   08/26/19 (!) 140 kg (309 lb 3 2 oz)   02/25/19 134 kg (294 lb 9 6 oz)   11/01/18 132 kg (291 lb 12 8 oz)       Physical Exam   Constitutional: She is oriented to person, place, and time  She appears well-developed and well-nourished  No distress  HENT:   Head: Normocephalic and atraumatic  Eyes: Pupils are equal, round, and reactive to light  Conjunctivae are normal    Neck: Normal range of motion  Neck supple  No thyromegaly present  Cardiovascular: Normal rate, regular rhythm and normal heart sounds     Pulmonary/Chest: Effort normal and breath sounds normal  No respiratory distress  She has no wheezes  She has no rales  Abdominal: Soft  Bowel sounds are normal  She exhibits no distension  There is no tenderness  Musculoskeletal: Normal range of motion  She exhibits no edema  Neurological: She is alert and oriented to person, place, and time  Skin: Skin is warm and dry  Psychiatric: She has a normal mood and affect  Vitals reviewed  Labs:   Component      Latest Ref Rng & Units 8/8/2019 8/8/2019 8/22/2019 8/22/2019          11:08 AM 11:08 AM 11:34 AM 11:34 AM   Cholesterol      50 - 200 mg/dL 137      Triglycerides      <=150 mg/dL 58      HDL      40 - 60 mg/dL 54      LDL Direct      0 - 100 mg/dL 71      Non-HDL Cholesterol      mg/dl 83      Hemoglobin A1C      4 2 - 6 3 %  5 4     EAG      mg/dl  108     Free T4      0 76 - 1 46 ng/dL    1 26   TSH 3RD GENERATON      0 358 - 3 740 uIU/mL   1 863    Vit D, 25-Hydroxy      30 0 - 100 0 ng/mL         Component      Latest Ref Rng & Units 8/22/2019          11:34 AM   Cholesterol      50 - 200 mg/dL    Triglycerides      <=150 mg/dL    HDL      40 - 60 mg/dL    LDL Direct      0 - 100 mg/dL    Non-HDL Cholesterol      mg/dl    Hemoglobin A1C      4 2 - 6 3 %    EAG      mg/dl    Free T4      0 76 - 1 46 ng/dL    TSH 3RD GENERATON      0 358 - 3 740 uIU/mL    Vit D, 25-Hydroxy      30 0 - 100 0 ng/mL 18 9 (L)          Impression & Plan:    Problem List Items Addressed This Visit        Endocrine    Hypothyroidism due to Hashimoto's thyroiditis - Primary     TSH at goal continue levothyroxine            Relevant Medications    levothyroxine 75 mcg tablet    Other Relevant Orders    TSH, 3rd generation    T4, free    Multiple thyroid nodules     Stable on ultrasound 6/2019         Relevant Medications    levothyroxine 75 mcg tablet       Other    Morbid obesity ([address] )     She has recently starting making dietary improvements/meal planning about 1 months ago and is now planning to start exercise now that kids are back in school  Vitamin D deficiency     She continues to forget to take supplements  Change OTC to Weekly RX Vitamin D  Relevant Medications    ergocalciferol (ERGOCALCIFEROL) 88868 units capsule    Other Relevant Orders    Vitamin D 25 hydroxy          Orders Placed This Encounter   Procedures    TSH, 3rd generation     This is a patient instruction: This test is non-fasting  Please drink two glasses of water morning of bloodwork  Standing Status:   Future     Standing Expiration Date:   8/26/2020    T4, free     Standing Status:   Future     Standing Expiration Date:   8/26/2020    Vitamin D 25 hydroxy     Standing Status:   Future     Standing Expiration Date:   8/26/2020       There are no Patient Instructions on file for this visit  Discussed with the patient and all questioned fully answered  She will call me if any problems arise  Follow-up appointment in 6 months       Counseled patient on diagnostic results, prognosis, risk and benefit of treatment options, instruction for management, importance of treatment compliance, Risk  factor reduction and impressions      Can PresidentISRAEL

## 2019-08-26 NOTE — ASSESSMENT & PLAN NOTE
She has recently starting making dietary improvements/meal planning about 1 months ago and is now planning to start exercise now that kids are back in school

## 2019-08-28 ENCOUNTER — LAB (OUTPATIENT)
Dept: LAB | Facility: MEDICAL CENTER | Age: 34
End: 2019-08-28
Payer: COMMERCIAL

## 2019-08-28 ENCOUNTER — OFFICE VISIT (OUTPATIENT)
Dept: OBGYN CLINIC | Facility: MEDICAL CENTER | Age: 34
End: 2019-08-28
Payer: COMMERCIAL

## 2019-08-28 VITALS — BODY MASS INDEX: 48.91 KG/M2 | DIASTOLIC BLOOD PRESSURE: 70 MMHG | SYSTOLIC BLOOD PRESSURE: 98 MMHG | WEIGHT: 293 LBS

## 2019-08-28 DIAGNOSIS — E66.01 MORBID OBESITY WITH BMI OF 45.0-49.9, ADULT (HCC): ICD-10-CM

## 2019-08-28 DIAGNOSIS — N92.6 LATE MENSES: ICD-10-CM

## 2019-08-28 DIAGNOSIS — N91.2 AMENORRHEA: ICD-10-CM

## 2019-08-28 DIAGNOSIS — N91.2 AMENORRHEA: Primary | ICD-10-CM

## 2019-08-28 LAB
INSULIN SERPL-ACNC: 12.8 MU/L (ref 3–25)
PROLACTIN SERPL-MCNC: 14.5 NG/ML
SL AMB POCT URINE HCG: NORMAL

## 2019-08-28 PROCEDURE — 36415 COLL VENOUS BLD VENIPUNCTURE: CPT

## 2019-08-28 PROCEDURE — 84146 ASSAY OF PROLACTIN: CPT

## 2019-08-28 PROCEDURE — 99214 OFFICE O/P EST MOD 30 MIN: CPT | Performed by: NURSE PRACTITIONER

## 2019-08-28 PROCEDURE — 83525 ASSAY OF INSULIN: CPT

## 2019-08-28 PROCEDURE — 81025 URINE PREGNANCY TEST: CPT | Performed by: NURSE PRACTITIONER

## 2019-08-28 NOTE — PROGRESS NOTES
Assessment/Plan: will call pt with results of labs and plan  Diagnoses and all orders for this visit:    Amenorrhea  -     Prolactin; Future  -     Insulin, fasting; Future    Late menses  -     POCT urine HCG    Morbid obesity with BMI of 45 0-49 9, adult (HCC)  -     Prolactin; Future  -     Insulin, fasting; Future               Patient ID: Piero León is a 34 y o  female  HPI:   42-year-old she 3 P3  Who was presenting with complaints of no menses for 2 months  Patient never misses cycles unless she is pregnant  She has done a home pregnancy test which was negative she denies any breast tenderness urinary frequency or signs of early pregnancy  States she has recently gained weight and diagnosed with hypothyroidism  She is now getting a meal plan delivered to her to try to help her lose weight she is sexually active and uses condoms all the time she reports to me a remote history of Dr Shashi renee telling her that she has PCOS  Since patient never had any problems conceiving never had irregular menses and never felt that she had signs of androgen excess a workup was not pursued  She has been seen by endocrine recently and recent hemoglobin A1c was normal   She denies any problems with headaches or visual changes  Review of Systems   Constitutional: Positive for unexpected weight change  HENT: Negative  Respiratory: Negative  Cardiovascular: Negative  Endocrine: Negative  Genitourinary: Negative  Musculoskeletal: Negative  Neurological: Negative  Objective:     Physical Exam   Constitutional: She appears well-developed and well-nourished  Skin: Skin is warm, dry and intact  Psychiatric: She has a normal mood and affect   Her behavior is normal

## 2019-08-29 DIAGNOSIS — N91.2 AMENORRHEA: Primary | ICD-10-CM

## 2019-08-29 NOTE — RESULT ENCOUNTER NOTE
Pl advise normal lab results  Monitor cycles and if still w/o menses in one to two months with neg home urine pt, she she should call office

## 2019-08-30 ENCOUNTER — HOSPITAL ENCOUNTER (OUTPATIENT)
Dept: ULTRASOUND IMAGING | Facility: HOSPITAL | Age: 34
Discharge: HOME | End: 2019-08-30
Payer: COMMERCIAL

## 2019-08-30 DIAGNOSIS — N91.2 AMENORRHEA: ICD-10-CM

## 2019-08-30 PROCEDURE — 76830 TRANSVAGINAL US NON-OB: CPT

## 2019-08-30 PROCEDURE — 76856 US EXAM PELVIC COMPLETE: CPT

## 2019-10-22 ENCOUNTER — HOSPITAL ENCOUNTER (EMERGENCY)
Facility: HOSPITAL | Age: 34
Discharge: HOME | End: 2019-10-22
Attending: EMERGENCY MEDICINE
Payer: COMMERCIAL

## 2019-10-22 VITALS
SYSTOLIC BLOOD PRESSURE: 134 MMHG | DIASTOLIC BLOOD PRESSURE: 65 MMHG | HEART RATE: 73 BPM | WEIGHT: 293 LBS | OXYGEN SATURATION: 98 % | RESPIRATION RATE: 18 BRPM | TEMPERATURE: 98.3 F | BODY MASS INDEX: 48.04 KG/M2

## 2019-10-22 DIAGNOSIS — N92.0 MENORRHAGIA: Primary | ICD-10-CM

## 2019-10-22 LAB
BACTERIA UR QL AUTO: ABNORMAL /HPF
BILIRUB UR QL STRIP: NEGATIVE
CLARITY UR: ABNORMAL
COLOR UR: ABNORMAL
EXT PREG TEST URINE: NORMAL
EXT. CONTROL ED NAV: NORMAL
GLUCOSE UR STRIP-MCNC: NEGATIVE MG/DL
HCT VFR BLD AUTO: 40.5 % (ref 34.8–46.1)
HGB BLD-MCNC: 13 G/DL (ref 11.5–15.4)
HGB UR QL STRIP.AUTO: ABNORMAL
KETONES UR STRIP-MCNC: ABNORMAL MG/DL
LEUKOCYTE ESTERASE UR QL STRIP: ABNORMAL
NITRITE UR QL STRIP: POSITIVE
NON-SQ EPI CELLS URNS QL MICRO: ABNORMAL /HPF
PH UR STRIP.AUTO: 5 [PH]
PROT UR STRIP-MCNC: >=300 MG/DL
RBC #/AREA URNS AUTO: ABNORMAL /HPF
SP GR UR STRIP.AUTO: 1.02 (ref 1–1.03)
TSH SERPL DL<=0.05 MIU/L-ACNC: 2.2 UIU/ML (ref 0.36–3.74)
UROBILINOGEN UR QL STRIP.AUTO: 2 E.U./DL
WBC #/AREA URNS AUTO: ABNORMAL /HPF

## 2019-10-22 PROCEDURE — 85018 HEMOGLOBIN: CPT | Performed by: EMERGENCY MEDICINE

## 2019-10-22 PROCEDURE — 81025 URINE PREGNANCY TEST: CPT | Performed by: EMERGENCY MEDICINE

## 2019-10-22 PROCEDURE — 99284 EMERGENCY DEPT VISIT MOD MDM: CPT

## 2019-10-22 PROCEDURE — 84443 ASSAY THYROID STIM HORMONE: CPT | Performed by: EMERGENCY MEDICINE

## 2019-10-22 PROCEDURE — 36415 COLL VENOUS BLD VENIPUNCTURE: CPT | Performed by: EMERGENCY MEDICINE

## 2019-10-22 PROCEDURE — 99284 EMERGENCY DEPT VISIT MOD MDM: CPT | Performed by: EMERGENCY MEDICINE

## 2019-10-22 PROCEDURE — 81001 URINALYSIS AUTO W/SCOPE: CPT | Performed by: EMERGENCY MEDICINE

## 2019-10-22 PROCEDURE — 85014 HEMATOCRIT: CPT | Performed by: EMERGENCY MEDICINE

## 2019-10-22 NOTE — ED ATTENDING ATTESTATION
10/22/2019  IMaurice DO, saw and evaluated the patient  I have discussed the patient with the resident/non-physician practitioner and agree with the resident's/non-physician practitioner's findings, Plan of Care, and MDM as documented in the resident's/non-physician practitioner's note, except where noted  All available labs and Radiology studies were reviewed  I was present for key portions of any procedure(s) performed by the resident/non-physician practitioner and I was immediately available to provide assistance  At this point I agree with the current assessment done in the Emergency Department  I have conducted an independent evaluation of this patient a history and physical is as follows:    ED Course         Critical Care Time  Procedures   79-year-old female presents to the emergency department with abnormal vaginal bleeding  Patient states she started with her normal menstrual period this month, however it has been much heavier  She states she is going through a tampon every hour instead of every 3 to 4 hours  She has having no abdominal pain  She does feel a little lightheaded  Prior to this menstrual   She did have 1 month of amenorrhea 2 months ago she had blood work and ultrasound which were normal   She eventually did get her menstrual period  On exam patient is alert in no distress  Heart is regular without murmur  Lungs are clear  Abdomen is obese, soft and nontender  Skin is normal color  Will check H&H, rule out pregnancy, check TSH, urinalysis, rule out STD

## 2019-10-22 NOTE — ED PROVIDER NOTES
History  Chief Complaint   Patient presents with    Vaginal Bleeding     Pt  reports heavy vaginal bleeding for the last three days  Pt  denies any pain  Pt  reports bleeding is more then her regular menstural cycle  This is a 72-year-old female that presents with chief complaint of vaginal bleeding  Patient reports that she was due for her period, reports that she has had heavier than normal bleeding for her  She states that she has changed her pad 3-4 times per day, the past not been entirely soaked through  She has not had any other vaginal discharge  She is currently not having any other symptoms, she denies any abdominal pain, denies any dysuria, denies lightheadedness, shortness of breath, or chest pain  She states she is currently not using any birth control  She she was seen for amenorrhea at the end of August, she received a pelvic ultrasound at that time that was normal   She did receive her period last month and it was slightly heavier than normal but as heavy as this  She does also report that she takes thyroid medication and she is compliant with this medication  Denies a history of bleeding problems in the past       Vaginal Bleeding   Severity:  Moderate  Onset quality:  Gradual  Duration:  3 days  Timing:  Constant  Progression:  Unchanged  Chronicity:  New  Associated symptoms: no abdominal pain, no dizziness, no dysuria, no fever and no nausea        Prior to Admission Medications   Prescriptions Last Dose Informant Patient Reported?  Taking?   ergocalciferol (ERGOCALCIFEROL) 92071 units capsule Past Week at Unknown time  No Yes   Sig: Take 1 capsule (50,000 Units total) by mouth once a week   levothyroxine 75 mcg tablet 10/22/2019 at Unknown time  No Yes   Sig: Take 1 tablet (75 mcg total) by mouth daily   multivitamin (THERAGRAN) TABS 10/22/2019 at Unknown time Self Yes Yes   Sig: Take 1 tablet by mouth daily      Facility-Administered Medications: None       Past Medical History: Diagnosis Date    Obesity        Past Surgical History:   Procedure Laterality Date    US GUIDED THYROID BIOPSY  11/13/2018       Family History   Problem Relation Age of Onset    No Known Problems Mother     Heart attack Father     Hypertension Father     Hyperlipidemia Father      I have reviewed and agree with the history as documented  Social History     Tobacco Use    Smoking status: Former Smoker    Smokeless tobacco: Never Used   Substance Use Topics    Alcohol use: Yes     Comment: socially    Drug use: No        Review of Systems   Constitutional: Negative for chills and fever  HENT: Negative for rhinorrhea and sore throat  Eyes: Negative for photophobia and visual disturbance  Respiratory: Negative for cough and shortness of breath  Cardiovascular: Negative for chest pain and palpitations  Gastrointestinal: Negative for abdominal pain, blood in stool, diarrhea, nausea and vomiting  Genitourinary: Positive for vaginal bleeding  Negative for dysuria and pelvic pain  Musculoskeletal: Negative for neck pain and neck stiffness  Skin: Negative for rash and wound  Neurological: Negative for dizziness, syncope, weakness, light-headedness and numbness  Psychiatric/Behavioral: Negative for agitation and confusion  All other systems reviewed and are negative  Physical Exam  ED Triage Vitals [10/22/19 1519]   Temperature Pulse Respirations Blood Pressure SpO2   98 3 °F (36 8 °C) 92 18 128/81 98 %      Temp Source Heart Rate Source Patient Position - Orthostatic VS BP Location FiO2 (%)   Oral Monitor Sitting Right arm --      Pain Score       No Pain             Orthostatic Vital Signs  Vitals:    10/22/19 1519 10/22/19 1701   BP: 128/81 134/65   Pulse: 92 73   Patient Position - Orthostatic VS: Sitting Sitting       Physical Exam   Constitutional: She is oriented to person, place, and time  She appears well-developed  No distress  HENT:   Head: Normocephalic     Right Ear: External ear normal    Left Ear: External ear normal    Nose: Nose normal    Mouth/Throat: Oropharynx is clear and moist    Eyes: Conjunctivae and EOM are normal    Neck: No tracheal deviation present  Cardiovascular: Normal rate, normal heart sounds and intact distal pulses  Pulmonary/Chest: Effort normal and breath sounds normal  No stridor  No respiratory distress  She has no wheezes  She has no rales  She exhibits no tenderness  Abdominal: Soft  She exhibits no distension  There is no tenderness  There is no rebound and no guarding  Musculoskeletal: She exhibits no tenderness or deformity  Neurological: She is alert and oriented to person, place, and time  No cranial nerve deficit or sensory deficit  She exhibits normal muscle tone  Skin: Skin is warm and dry  Capillary refill takes less than 2 seconds  She is not diaphoretic  Psychiatric: Her behavior is normal    Nursing note and vitals reviewed  ED Medications  Medications - No data to display    Diagnostic Studies  Results Reviewed     Procedure Component Value Units Date/Time    Urine Microscopic [[id number]]  (Abnormal) Collected:  10/22/19 1652    Lab Status:  Final result Specimen:  Urine, Clean Catch Updated:  10/22/19 1743     RBC, UA Innumerable /hpf      WBC, UA       Field obscured, unable to enumerate     /hpf     Epithelial Cells Occasional /hpf      Bacteria, UA       Field obscured, unable to enumerate     /hpf    TSH, 3rd generation with Free T4 reflex [[id number]]  (Normal) Collected:  10/22/19 1659    Lab Status:  Final result Specimen:  Blood from Arm, Right Updated:  10/22/19 1733     TSH 3RD GENERATON 2 198 uIU/mL     Narrative:       Patients undergoing fluorescein dye angiography may retain small amounts of fluorescein in the body for 48-72 hours post procedure  Samples containing fluorescein can produce falsely depressed TSH values   If the patient had this procedure,a specimen should be resubmitted post fluorescein clearance  UA  w Reflex to Microscopic [[id number]]  (Abnormal) Collected:  10/22/19 1652    Lab Status:  Final result Specimen:  Urine, Clean Catch Updated:  10/22/19 1717     Color, UA Red     Clarity, UA Cloudy     Specific Gravity, UA 1 025     pH, UA 5 0     Leukocytes, UA Moderate     Nitrite, UA Positive     Protein, UA >=300 mg/dl      Glucose, UA Negative mg/dl      Ketones, UA Trace mg/dl      Urobilinogen, UA 2 0 E U /dl      Bilirubin, UA Negative     Blood, UA Large    Hemoglobin and hematocrit, blood [[id number]]  (Normal) Collected:  10/22/19 1659    Lab Status:  Final result Specimen:  Blood from Arm, Right Updated:  10/22/19 1713     Hemoglobin 13 0 g/dL      Hematocrit 40 5 %     Chlamydia/GC amplified DNA by PCR [[id number]] Collected:  10/22/19 1652    Lab Status: In process Specimen:  Urine, Other Updated:  10/22/19 1657    POCT pregnancy, urine [[id number]]  (Normal) Resulted:  10/22/19 1649    Lab Status:  Final result Updated:  10/22/19 1649     EXT PREG TEST UR (Ref: Negative) NEG (-)     Control VALID                 No orders to display         Procedures  Procedures        ED Course  ED Course as of Oct 22 1807   Tue Oct 22, 2019   1714 Hemoglobin: 13 0   1714 PREGNANCY TEST URINE: NEG (-)   1738 TSH 3RD GENERATON: 2 198                               MDM  Number of Diagnoses or Management Options  Diagnosis management comments: This a 68-year-old female that presents with complaints of heavy vaginal bleeding x3 days  She is complaining of some mild fatigue, she is not complaining of any symptoms at this time to suggest he symptomatic anemia  On exam patient is well-appearing, she has normal vital signs which is reassuring she is unlikely to have had significant hemorrhage  Reassuring that patient has not complained of any abdominal pain has no abdominal tenderness  Will check urine preg to rule out pregnancy  Will also send UA and GC Chlamydia   Will check H&H to rule out significant anemia  Will check TSH w/ reflex to r/o thyroid dysfunction as cause of her symptoms  Disposition  Final diagnoses:   Menorrhagia     Time reflects when diagnosis was documented in both MDM as applicable and the Disposition within this note     Time User Action Codes Description Comment    10/22/2019  4:59 PM Carmelina Urena Add [N92 0] Menorrhagia       ED Disposition     ED Disposition Condition Date/Time Comment    Discharge Stable Tue Oct 22, 2019  4:59 PM Vassie Oar discharge to home/self care  Follow-up Information     Follow up With Specialties Details Why [address] [place], [place] [facility], Nurse Practitioner, Obstetrics, Gynecology Schedule an appointment as soon as possible for a visit  Heavy vaginal bleeding [address] [facility]  [address]  [address] [facility]  [phone]            Patient's Medications   Discharge Prescriptions    No medications on file     No discharge procedures on file  ED Provider  Attending physically available and evaluated Vassie Oar  I managed the patient along with the ED Attending      Electronically Signed by         Michael Wells MD  10/22/19 0354       Michael Wells MD  10/22/19 8042

## 2019-10-25 LAB
C TRACH DNA SPEC QL NAA+PROBE: ABNORMAL
N GONORRHOEA DNA SPEC QL NAA+PROBE: ABNORMAL

## 2019-10-25 NOTE — RESULT ENCOUNTER NOTE
Informed patient of results and instructed to follow-up with PCP for further testing as needed   Patient notes understanding

## 2019-11-27 DIAGNOSIS — E55.9 VITAMIN D DEFICIENCY: ICD-10-CM

## 2019-11-27 DIAGNOSIS — E06.3 HYPOTHYROIDISM DUE TO HASHIMOTO'S THYROIDITIS: ICD-10-CM

## 2019-11-27 DIAGNOSIS — E03.8 HYPOTHYROIDISM DUE TO HASHIMOTO'S THYROIDITIS: ICD-10-CM

## 2019-11-27 RX ORDER — ERGOCALCIFEROL (VITAMIN D2) 1250 MCG
50000 CAPSULE ORAL WEEKLY
Qty: 12 CAPSULE | Refills: 0 | Status: CANCELLED | OUTPATIENT
Start: 2019-11-27

## 2019-11-27 RX ORDER — LEVOTHYROXINE SODIUM 0.07 MG/1
75 TABLET ORAL DAILY
Qty: 90 TABLET | Refills: 0 | Status: CANCELLED | OUTPATIENT
Start: 2019-11-27

## 2019-12-04 ENCOUNTER — TELEPHONE (OUTPATIENT)
Dept: FAMILY MEDICINE CLINIC | Facility: CLINIC | Age: 34
End: 2019-12-04

## 2019-12-04 DIAGNOSIS — F41.8 ANXIETY WITH DEPRESSION: Primary | ICD-10-CM

## 2019-12-04 RX ORDER — ESCITALOPRAM OXALATE 10 MG/1
10 TABLET ORAL DAILY
Qty: 90 TABLET | Refills: 1 | Status: SHIPPED | OUTPATIENT
Start: 2019-12-04 | End: 2020-03-04 | Stop reason: SDUPTHER

## 2019-12-04 NOTE — TELEPHONE ENCOUNTER
----- Message from Airam Rodríguez sent at 12/4/2019 10:41 AM EST -----  Regarding: Prescription Question  Contact: [phone]  Good morning,    I went to request for a refill on my Lexapro 10mg tablet but the option on my chart is gone  Could you please submit for refill ?

## 2019-12-04 NOTE — TELEPHONE ENCOUNTER
Do not know why it fell off the chart but it is a regular medication that she has been taking  Last refilled in May  I will put it through to the pharmacy

## 2020-01-08 ENCOUNTER — OFFICE VISIT (OUTPATIENT)
Dept: OBGYN CLINIC | Facility: MEDICAL CENTER | Age: 35
End: 2020-01-08
Payer: COMMERCIAL

## 2020-01-08 VITALS — WEIGHT: 287 LBS | BODY MASS INDEX: 46.32 KG/M2 | DIASTOLIC BLOOD PRESSURE: 60 MMHG | SYSTOLIC BLOOD PRESSURE: 118 MMHG

## 2020-01-08 DIAGNOSIS — E06.3 HYPOTHYROIDISM DUE TO HASHIMOTO'S THYROIDITIS: ICD-10-CM

## 2020-01-08 DIAGNOSIS — N91.2 AMENORRHEA: Primary | ICD-10-CM

## 2020-01-08 DIAGNOSIS — E03.8 HYPOTHYROIDISM DUE TO HASHIMOTO'S THYROIDITIS: ICD-10-CM

## 2020-01-08 DIAGNOSIS — E66.01 MORBID OBESITY (HCC): ICD-10-CM

## 2020-01-08 LAB — SL AMB POCT URINE HCG: NEGATIVE

## 2020-01-08 PROCEDURE — 99214 OFFICE O/P EST MOD 30 MIN: CPT | Performed by: NURSE PRACTITIONER

## 2020-01-08 PROCEDURE — 81025 URINE PREGNANCY TEST: CPT | Performed by: NURSE PRACTITIONER

## 2020-01-08 RX ORDER — MEDROXYPROGESTERONE ACETATE 10 MG/1
10 TABLET ORAL DAILY
Qty: 10 TABLET | Refills: 0 | Status: SHIPPED | OUTPATIENT
Start: 2020-01-08 | End: 2020-02-05 | Stop reason: ALTCHOICE

## 2020-01-08 NOTE — PROGRESS NOTES
Assessment Mary was seen today for menstrual problem  Diagnoses and all orders for this visit:    Amenorrhea  -     medroxyPROGESTERone (PROVERA) 10 mg tablet; Take 1 tablet (10 mg total) by mouth daily for 10 days  -     POCT urine HCG    Morbid obesity ([address] )    Hypothyroidism due to Hashimoto's thyroiditis         Plan;   Discussed with patient that  Anovulation likely due to stress  And or weight  Encouraged her to continue with her weight loss plan  Discussed with her that she needs to have a period at least every 6 months  I discussed with her giving her a prescription for Provera that she can either take now to induce a period or take in a month or 2 if she still does not  Have a period before then  She is in agree with with this plan as she does not mind not bleeding just wants to make sure that she is not pregnant  Recommended that she buy over-the-counter [facility] pregnancy test and take them as needed  Patient advised to call if she does not bleed within 2 weeks of ending Provera if she bleeds during the course of it she can stop the remainder of the pills  Urine PT in the office was negative today  25 minutes was spent with pt of which >50% was counseling and coordination of care  Subjective   Yelena Oden is a 30 y o  female here for a problem visit  Patient is complaining of no menses since oct  States that she is  Not currently sexually active states  Her menses are always irregular she has been told in the past that she has PCOS  When I saw her last in August she was here with a similar complaint however she did have a period in September and October  She is morbidly obese but states it is already starting to lose weight on her program and has always been this weight so does not believe this is enough factor in her  Likely an ovulation  She has not done a home UPT but would like us to do 1 for her  She denies any symptoms of pregnancy    She is not sexually active now but uses condoms whenever she is  She follows with Dr Vani Moon  and her  TSH is in the normal range    Patient Active Problem List   Diagnosis    Anxiety with depression    Morbid obesity ([address] )    Hypothyroidism due to Hashimoto's thyroiditis    Multiple thyroid nodules    Vitamin D deficiency       Gynecologic History  Patient's last menstrual period was 10/08/2019    The current method of family planning is condoms    Past Medical History:   Diagnosis Date    Obesity      Past Surgical History:   Procedure Laterality Date    US GUIDED THYROID BIOPSY  11/13/2018     Family History   Problem Relation Age of Onset    No Known Problems Mother     Heart attack Father     Hypertension Father     Hyperlipidemia Father      Social History     Socioeconomic History    Marital status: Single     Spouse name: Not on file    Number of children: Not on file    Years of education: Not on file    Highest education level: Not on file   Occupational History    Not on file   Social Needs    Financial resource strain: Not on file    Food insecurity:     Worry: Not on file     Inability: Not on file    Transportation needs:     Medical: Not on file     Non-medical: Not on file   Tobacco Use    Smoking status: Former Smoker    Smokeless tobacco: Never Used   Substance and Sexual Activity    Alcohol use: Yes     Comment: socially    Drug use: No    Sexual activity: Not Currently     Partners: Male     Birth control/protection: Condom Male   Lifestyle    Physical activity:     Days per week: Not on file     Minutes per session: Not on file    Stress: Not on file   Relationships    Social connections:     Talks on phone: Not on file     Gets together: Not on file     Attends Confucianist service: Not on file     Active member of club or organization: Not on file     Attends meetings of clubs or organizations: Not on file     Relationship status: Not on file    Intimate partner violence: Fear of current or ex partner: Not on file     Emotionally abused: Not on file     Physically abused: Not on file     Forced sexual activity: Not on file   Other Topics Concern    Not on file   Social History Narrative    Not on file     No Known Allergies    Current Outpatient Medications:     ergocalciferol (ERGOCALCIFEROL) 74652 units capsule, Take 1 capsule (50,000 Units total) by mouth once a week, Disp: 12 capsule, Rfl: 1    escitalopram (LEXAPRO) 10 mg tablet, Take 1 tablet (10 mg total) by mouth daily, Disp: 90 tablet, Rfl: 1    levothyroxine 75 mcg tablet, Take 1 tablet (75 mcg total) by mouth daily, Disp: 90 tablet, Rfl: 1    multivitamin (THERAGRAN) TABS, Take 1 tablet by mouth daily, Disp: , Rfl:     medroxyPROGESTERone (PROVERA) 10 mg tablet, Take 1 tablet (10 mg total) by mouth daily for 10 days, Disp: 10 tablet, Rfl: 0    Review of Systems  Constitutional :no fever, feels well, no tiredness, no recent weight gain or loss  ENT: no ear ache, no loss of hearing, no nosebleeds or nasal discharge, no sore throat or hoarseness  Cardiovascular: no complaints of slow or fast heart beat, no chest pain, no palpitations, no leg claudication or lower extremity edema  Respiratory: no complaints of shortness of shortness of breath, no GILLESPIE  Breasts:no complaints of breast pain, breast lump, or nipple discharge  Gastrointestinal: no complaints of abdominal pain, constipation, nausea, vomiting, or diarrhea or bloody stools  Genitourinary : no complaints of dysuria, incontinence, pelvic pain, no dysmenorrhea, vaginal discharge or abnormal vaginal bleeding and as noted in HPI  Musculoskeletal: no complaints of arthralgia, no myalgia, no joint swelling or stiffness, no limb pain or swelling    Integumentary: no complaints of skin rash or lesion, itching or dry skin  Neurological: no complaints of headache, no confusion, no numbness or tingling, no dizziness or fainting     Objective     /60   Wt 130 kg (287 lb)   LMP 10/08/2019   BMI 46 32 kg/m²     General:   appears stated age, cooperative, alert normal mood and affect      Psychiatric orientation to person, place, and time: normal  mood and affect: normal

## 2020-02-05 ENCOUNTER — OFFICE VISIT (OUTPATIENT)
Dept: FAMILY MEDICINE CLINIC | Facility: CLINIC | Age: 35
End: 2020-02-05
Payer: COMMERCIAL

## 2020-02-05 VITALS
RESPIRATION RATE: 18 BRPM | SYSTOLIC BLOOD PRESSURE: 116 MMHG | BODY MASS INDEX: 46.51 KG/M2 | OXYGEN SATURATION: 98 % | DIASTOLIC BLOOD PRESSURE: 70 MMHG | WEIGHT: 289.4 LBS | TEMPERATURE: 97.6 F | HEART RATE: 70 BPM | HEIGHT: 66 IN

## 2020-02-05 DIAGNOSIS — E06.3 HYPOTHYROIDISM DUE TO HASHIMOTO'S THYROIDITIS: ICD-10-CM

## 2020-02-05 DIAGNOSIS — E66.01 MORBID OBESITY (HCC): ICD-10-CM

## 2020-02-05 DIAGNOSIS — F41.8 ANXIETY WITH DEPRESSION: ICD-10-CM

## 2020-02-05 DIAGNOSIS — E04.2 MULTIPLE THYROID NODULES: ICD-10-CM

## 2020-02-05 DIAGNOSIS — E03.8 HYPOTHYROIDISM DUE TO HASHIMOTO'S THYROIDITIS: ICD-10-CM

## 2020-02-05 DIAGNOSIS — Z00.00 WELL ADULT EXAM: Primary | ICD-10-CM

## 2020-02-05 PROCEDURE — 99395 PREV VISIT EST AGE 18-39: CPT | Performed by: FAMILY MEDICINE

## 2020-02-05 NOTE — PROGRESS NOTES
Assessment/Plan:   Diagnoses and all orders for this visit:    Well adult exam    Multiple thyroid nodules    Hypothyroidism due to Hashimoto's thyroiditis    Morbid obesity ([address] )    Anxiety with depression         Patient does understand need for aggressive fitness and dietary compliance to improve BMI  All other preventative measures are up-to-date  She should follow with me for routine care yearly  Subjective:   Chief Complaint   Patient presents with    Physical Exam     Pt presents for a physcial exam        Patient ID: Dennie Poland is a 30 y o  female  Patient is a pleasant 61-year-old here for routine physical exam   She is up-to-date on gyn  She has been having some irregular menses and has been up-to-date with evaluation with her gyn  She is followed with endocrinology for thyroid  PCO workup unremarkable  Pelvic ultrasound unremarkable  Patient had a negative FNA previously of the dominant thyroid nodule  Patient is trying to work out regularly at [employer]  She is extremely busy with her young children who are involved in sports and school activities  She is up-to-date on eye checkup and dental   She is amicably   from her   The following portions of the patient's history were reviewed and updated as appropriate: allergies, current medications, past family history, past medical history, past social history, past surgical history and problem list       Review of Systems   Constitutional: Negative for fatigue and fever  HENT: Dental problem:   Up-to-date  Respiratory: Negative for cough and shortness of breath  Cardiovascular: Negative for chest pain  Gastrointestinal: Negative  Genitourinary: Negative  Musculoskeletal: Negative  Neurological: Negative  Psychiatric/Behavioral: Negative            Objective:      /70 (BP Location: Left arm, Patient Position: Sitting, Cuff Size: Large)   Pulse 70   Temp 97 6 °F (36 4 °C) (Temporal)   Resp 18   Ht 5' 5 5" (1 664 m)   Wt 131 kg (289 lb 6 4 oz)   LMP 01/12/2020 (Exact Date)   SpO2 98%   BMI 47 43 kg/m²          Physical Exam   Constitutional: She is oriented to person, place, and time  She appears well-developed and well-nourished  No distress  Pleasant 44-year-old female who appears her stated age with a BMI of 47%   HENT:   Head: Normocephalic  Eyes: Pupils are equal, round, and reactive to light  Conjunctivae and EOM are normal    Neck: Normal range of motion  Neck supple  No thyromegaly present  Cardiovascular: Normal rate, regular rhythm, normal heart sounds and intact distal pulses  No murmur heard  No carotid abdominal or femoral bruit   Pulmonary/Chest: Effort normal and breath sounds normal    Abdominal: Soft  Bowel sounds are normal  She exhibits no mass  There is no tenderness  Musculoskeletal: Normal range of motion  She exhibits no edema  Lymphadenopathy:     She has no cervical adenopathy  Neurological: She is alert and oriented to person, place, and time  She has normal reflexes  Skin: Skin is warm and dry  Psychiatric: She has a normal mood and affect  Her behavior is normal  Judgment and thought content normal    Vitals reviewed

## 2020-02-05 NOTE — PROGRESS NOTES
BMI Counseling: Body mass index is 47 43 kg/m²  The BMI is above normal  Nutrition recommendations include reducing portion sizes, decreasing overall calorie intake, 3-5 servings of fruits/vegetables daily, reducing fast food intake, consuming healthier snacks, decreasing soda and/or juice intake, moderation in carbohydrate intake, increasing intake of lean protein, reducing intake of saturated fat and trans fat and reducing intake of cholesterol  Exercise recommendations include exercising 3-5 times per week

## 2020-03-04 DIAGNOSIS — E03.8 HYPOTHYROIDISM DUE TO HASHIMOTO'S THYROIDITIS: ICD-10-CM

## 2020-03-04 DIAGNOSIS — F41.8 ANXIETY WITH DEPRESSION: ICD-10-CM

## 2020-03-04 DIAGNOSIS — E55.9 VITAMIN D DEFICIENCY: ICD-10-CM

## 2020-03-04 DIAGNOSIS — E06.3 HYPOTHYROIDISM DUE TO HASHIMOTO'S THYROIDITIS: ICD-10-CM

## 2020-03-04 RX ORDER — ERGOCALCIFEROL (VITAMIN D2) 1250 MCG
50000 CAPSULE ORAL WEEKLY
Qty: 12 CAPSULE | Refills: 0 | Status: SHIPPED | OUTPATIENT
Start: 2020-03-04 | End: 2020-05-20 | Stop reason: SDUPTHER

## 2020-03-04 RX ORDER — ESCITALOPRAM OXALATE 10 MG/1
10 TABLET ORAL DAILY
Qty: 90 TABLET | Refills: 0 | Status: SHIPPED | OUTPATIENT
Start: 2020-03-04 | End: 2020-05-20 | Stop reason: SDUPTHER

## 2020-03-04 RX ORDER — LEVOTHYROXINE SODIUM 0.07 MG/1
75 TABLET ORAL DAILY
Qty: 90 TABLET | Refills: 0 | Status: SHIPPED | OUTPATIENT
Start: 2020-03-04 | End: 2020-05-19 | Stop reason: SDUPTHER

## 2020-03-23 DIAGNOSIS — F41.8 ANXIETY ABOUT HEALTH: Primary | ICD-10-CM

## 2020-03-23 RX ORDER — LORAZEPAM 0.5 MG/1
TABLET ORAL
Qty: 20 TABLET | Refills: 0 | Status: SHIPPED | OUTPATIENT
Start: 2020-03-23 | End: 2020-05-26 | Stop reason: ALTCHOICE

## 2020-03-25 ENCOUNTER — TELEMEDICINE (OUTPATIENT)
Dept: FAMILY MEDICINE CLINIC | Facility: CLINIC | Age: 35
End: 2020-03-25
Payer: OTHER MISCELLANEOUS

## 2020-03-25 ENCOUNTER — TELEPHONE (OUTPATIENT)
Dept: FAMILY MEDICINE CLINIC | Facility: CLINIC | Age: 35
End: 2020-03-25

## 2020-03-25 DIAGNOSIS — Z20.828 EXPOSURE TO SARS-ASSOCIATED CORONAVIRUS: ICD-10-CM

## 2020-03-25 DIAGNOSIS — Z20.828 EXPOSURE TO SARS-ASSOCIATED CORONAVIRUS: Primary | ICD-10-CM

## 2020-03-25 PROCEDURE — 99213 OFFICE O/P EST LOW 20 MIN: CPT | Performed by: FAMILY MEDICINE

## 2020-03-25 PROCEDURE — 87635 SARS-COV-2 COVID-19 AMP PRB: CPT

## 2020-03-25 NOTE — TELEPHONE ENCOUNTER
Pt called the office in regards to she would like to be tested for  th flu and  covid 19  Pt currently started with cold sweats randomly pt left work early on Monday due to chest and ribs hurting  That afternoon pt started with a cough and had cold sweats pt's temp was 99 at that time  Pt feels that she can not inhale completely  Pt last checked her temperature 10 minutes ago and it was 100 1   Pt last took tylenol and flu around 7 pm last night  Pt's temperature last night was 100 4 this was pt's first fever  Pt has no sob just feel like she can't take a full deep breath  When pt tries to take a deep breath her ribs are sore and can't breathe all the way  Pt does have the cough it is dry  No known exposure to covid 19  No international travel  Pt has traveled to [place] on 3/7 and 3/8  No [place] and or loren         Pt's number is [phone]

## 2020-03-25 NOTE — TELEPHONE ENCOUNTER
You can put the patient it as a virtual telephone OR video Sindhu visit and I can order and Center to the medical mobile screening facility closest to her

## 2020-03-25 NOTE — PROGRESS NOTES
COVID-19 Virtual Visit     This virtual check-in was done via [employer] and patient was informed that this is not a secure, HIPAA-complaint platform  she agrees to proceed     Encounter provider Tawny Robles DO    Provider located at [address] [place] [phone]    Recent Visits  No visits were found meeting these conditions  Showing recent visits within past 7 days and meeting all other requirements     Today's Visits  Date Type Provider Dept   03/25/20 Telemedicine Tawny Robles DO [facility] 5460 [facility]   03/25/20 Telephone Adonay Lund MA [facility] 129 [facility] today's visits and meeting all other requirements     Future Appointments  Date Type Provider Dept   03/25/20 Telemedicine Tawny Robles DO [facility]60 [facility]   Showing future appointments within next 150 days and meeting all other requirements        Patient agrees to participate in a virtual check in via telephone or video visit instead of presenting to the office to address urgent/immediate medical needs  Patient is aware this is a billable service  After connecting through [employer], the patient was identified by name and date of birth  Tahira Loyd was informed that this was a telemedicine visit and that the exam was being conducted confidentially over secure lines  My office door was closed  No one else was in the room  Tahira Loyd acknowledged consent and understanding of privacy and security of the telemedicine visit  I informed the patient that I have reviewed her record in Epic and presented the opportunity for her to ask any questions regarding the visit today  The patient agreed to participate  Tahira Loyd is a 30 y o  female who is concerned about COVID-19  She reports fever, cough, shortness of breath and  4 and chills  She has not traveled outside the [place]  within the last 14 days    She has not had contact with a person who is under investigation for or who is positive for COVID-19 within the last 14 days,   Although she does work at the [employer] at [place] with 12 other employees in 1 room  One employee tested negative and there is 1 who is still out awaiting  She has not been hospitalized recently for fever and/or lower respiratory symptoms  Review of Systems - General ROS: positive for  - malaise and chills  ENT ROS: positive for - nasal congestion  Respiratory ROS: positive for - shortness of breath and   Hard to take a breath  Past Medical History:   Diagnosis Date    Obesity        Past Surgical History:   Procedure Laterality Date    US GUIDED THYROID BIOPSY  11/13/2018       Current Outpatient Medications   Medication Sig Dispense Refill    ergocalciferol (ERGOCALCIFEROL) 1 25 MG (91120 UT) capsule Take 1 capsule (50,000 Units total) by mouth once a week 12 capsule 0    escitalopram (LEXAPRO) 10 mg tablet Take 1 tablet (10 mg total) by mouth daily 90 tablet 0    levothyroxine 75 mcg tablet Take 1 tablet (75 mcg total) by mouth daily 90 tablet 0    LORazepam (ATIVAN) 0 5 mg tablet Take half tablet to full tablet daily prn 20 tablet 0    multivitamin (THERAGRAN) TABS Take 1 tablet by mouth daily       No current facility-administered medications for this visit  No Known Allergies    Video Exam    Landon Billing appears alert, mild distress, cooperative, smiling  Color and turgor appear fair she appears slightly fatigued     Disposition:      I referred Mary to one of our centralized sites for a COVID-19 swab  [address]    Your healthcare provider and/or public health staff have evaluated you and have determined that you do not need to remain in the hospital at this time  At this time you can be isolated at home where you will be monitored by staff from your local or state health department   You should carefully follow the prevention and isolation steps below until a healthcare provider or local or [facility] health department says that you can return to your normal activities  Stay home except to get medical care    People who are mildly ill with COVID-19 are able to isolate at home during their illness  You should restrict activities outside your home, except for getting medical care  Do not go to work, school, or public areas  Avoid using public transportation, ride-sharing, or taxis  Separate yourself from other people and animals in your home    People: As much as possible, you should stay in a specific room and away from other people in your home  Also, you should use a separate bathroom, if available  Animals: You should restrict contact with pets and other animals while you are sick with COVID-19, just like you would around other people  Although there have not been reports of pets or other animals becoming sick with COVID-19, it is still recommended that people sick with COVID-19 limit contact with animals until more information is known about the virus  When possible, have another member of your household care for your animals while you are sick  If you are sick with COVID-19, avoid contact with your pet, including petting, snuggling, being kissed or licked, and sharing food  If you must care for your pet or be around animals while you are sick, wash your hands before and after you interact with pets and wear a facemask  See COVID-19 and Animals for more information  Call ahead before visiting your doctor    If you have a medical appointment, call the healthcare provider and tell them that you have or may have COVID-19  This will help the healthcare providers office take steps to keep other people from getting infected or exposed  Wear a facemask    You should wear a facemask when you are around other people (e g , sharing a room or vehicle) or pets and before you enter a healthcare providers office   If you are not able to wear a facemask (for example, because it causes trouble breathing), then people who live with you should not stay in the same room with you, or they should wear a facemask if they enter your room  Cover your coughs and sneezes    Cover your mouth and nose with a tissue when you cough or sneeze  Throw used tissues in a lined trash can  Immediately wash your hands with soap and water for at least 20 seconds or, if soap and water are not available, clean your hands with an alcohol-based hand  that contains at least 60% alcohol  Clean your hands often    Wash your hands often with soap and water for at least 20 seconds, especially after blowing your nose, coughing, or sneezing; going to the bathroom; and before eating or preparing food  If soap and water are not readily available, use an alcohol-based hand  with at least 60% alcohol, covering all surfaces of your hands and rubbing them together until they feel dry  Soap and water are the best option if hands are visibly dirty  Avoid touching your eyes, nose, and mouth with unwashed hands  Avoid sharing personal household items    You should not share dishes, drinking glasses, cups, eating utensils, towels, or bedding with other people or pets in your home  After using these items, they should be washed thoroughly with soap and water  Clean all high-touch surfaces everyday    High touch surfaces include counters, tabletops, doorknobs, bathroom fixtures, toilets, phones, keyboards, tablets, and bedside tables  Also, clean any surfaces that may have blood, stool, or body fluids on them  Use a household cleaning spray or wipe, according to the label instructions  Labels contain instructions for safe and effective use of the cleaning product including precautions you should take when applying the product, such as wearing gloves and making sure you have good ventilation during use of the product      Monitor your symptoms    Seek prompt medical attention if your illness is worsening (e g , difficulty breathing)  Before seeking care, call your healthcare provider and tell them that you have, or are being evaluated for, COVID-19  Put on a facemask before you enter the facility  These steps will help the healthcare providers office to keep other people in the office or waiting room from getting infected or exposed  Ask your healthcare provider to call the local or state health department  Persons who are placed under active monitoring or facilitated self-monitoring should follow instructions provided by their local health department or occupational health professionals, as appropriate  If you have a medical emergency and need to call 911, notify the dispatch personnel that you have, or are being evaluated for COVID-19  If possible, put on a facemask before emergency medical services arrive  Discontinuing home isolation    Patients with confirmed COVID-19 should remain under home isolation precautions until the risk of secondary transmission to others is thought to be low  The decision to discontinue home isolation precautions should be made on a case-by-case basis, in consultation with healthcare providers and state and local health departments  Source: Wolfgang muñiz    I spent 15 minutes with the patient during this virtual check-in visit

## 2020-03-30 ENCOUNTER — TELEMEDICINE (OUTPATIENT)
Dept: FAMILY MEDICINE CLINIC | Facility: CLINIC | Age: 35
End: 2020-03-30
Payer: OTHER MISCELLANEOUS

## 2020-03-30 ENCOUNTER — TELEPHONE (OUTPATIENT)
Dept: FAMILY MEDICINE CLINIC | Facility: CLINIC | Age: 35
End: 2020-03-30

## 2020-03-30 DIAGNOSIS — U07.1 COVID-19 VIRUS DETECTED: Primary | ICD-10-CM

## 2020-03-30 LAB — SARS-COV-2 RNA SPEC QL NAA+PROBE: DETECTED

## 2020-03-30 PROCEDURE — 99213 OFFICE O/P EST LOW 20 MIN: CPT | Performed by: FAMILY MEDICINE

## 2020-03-30 NOTE — PROGRESS NOTES
Virtual Regular Visit    Problem List Items Addressed This Visit     None      Visit Diagnoses     COVID-19 virus detected    -  Primary          Discussed with patient the implications of corona virus positive testing  She is improving  We will make virtual video contact this Wednesday and Friday to keep updated on her symptoms  She is defervescing  Appropriately  She will continue self core teen at least until Monday April 6th  She is that she can contact this office ASAP with any increasing respiratory   Distress etc  Also told her to contact us if the children become symptomatic  We will direct appropriate testing in follow-up for them  Reason for visit is discussion of corona virus positive nasal swab   81-year-old female who is seen today for follow-up on corona virus positive nasal swab collected last Wednesday  She started with symptoms last Monday March 23rd  She states that she actually is feeling better  She can take a deeper breath without any symptoms  She is at home and is continuing self quarantine  She does have to elementary aged children at home and they are asymptomatic  Patient is keeping hydrated  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      Encounter provider Sreekanth Luu DO    Provider located at [address] [place] [phone]      Recent Visits  Date Type Provider Dept   03/25/20 1462 [facility], DO [facility] 5460 [facility]   03/25/20 Telephone Delicia Ewing 66 Total 129 [facility] recent visits within past 7 days and meeting all other requirements     Today's Visits  Date Type Provider Dept   03/30/20 Telemedicine Sreekanth Luu DO [facility] 5460 [facility]   03/30/20 Telephone Jenni Shay MA [facility] 129 [facility] today's visits and meeting all other requirements     Future Appointments  Date Type Provider Dept   03/30/20 Telemedicine Sreekanth Luu DO [facility] 2418 [facility]   Showing future appointments within next 150 days and meeting all other requirements        The patient was identified by name and date of birth  Abdias Seo was informed that this is a telemedicine visit and that the visit is being conducted through [employer] and patient was informed that this is not a secure, HIPAA-complaint platform  she agrees to proceed     My office door was closed  No one else was in the room  She acknowledged consent and understanding of privacy and security of the video platform  The patient has agreed to participate and understands they can discontinue the visit at any time  Patient is aware this is a billable service  Subjective  Abdias Seo is a 30 y o  female  who is contact today with a positive corona virus test       Past Medical History:   Diagnosis Date    Obesity        Past Surgical History:   Procedure Laterality Date    US GUIDED THYROID BIOPSY  11/13/2018       Current Outpatient Medications   Medication Sig Dispense Refill    ergocalciferol (ERGOCALCIFEROL) 1 25 MG (58957 UT) capsule Take 1 capsule (50,000 Units total) by mouth once a week 12 capsule 0    escitalopram (LEXAPRO) 10 mg tablet Take 1 tablet (10 mg total) by mouth daily 90 tablet 0    levothyroxine 75 mcg tablet Take 1 tablet (75 mcg total) by mouth daily 90 tablet 0    LORazepam (ATIVAN) 0 5 mg tablet Take half tablet to full tablet daily prn 20 tablet 0    multivitamin (THERAGRAN) TABS Take 1 tablet by mouth daily       No current facility-administered medications for this visit  No Known Allergies    Review of Systems   Constitutional: Positive for fatigue  Negative for fever  Respiratory: Negative for cough  Shortness of breath:   Patient is able to achieve a normal deep breath  Gastrointestinal: Negative  Genitourinary: Negative  Psychiatric/Behavioral: Negative          Physical Exam   Constitutional: She appears well-developed and well-nourished  42-year-old female who appears her stated age in no acute distress appears fatigued  Pulmonary/Chest: Effort normal    Psychiatric: She has a normal mood and affect  Her behavior is normal  Judgment and thought content normal    Slightly tearful        I spent 15  minutes with the patient during this visit

## 2020-03-30 NOTE — TELEPHONE ENCOUNTER
Laura from [employer] called stating the Pt had an abnormal result for [id number], it was detected  Please advise

## 2020-04-03 ENCOUNTER — TELEPHONE (OUTPATIENT)
Dept: FAMILY MEDICINE CLINIC | Facility: CLINIC | Age: 35
End: 2020-04-03

## 2020-05-07 ENCOUNTER — PATIENT OUTREACH (OUTPATIENT)
Dept: FAMILY MEDICINE CLINIC | Facility: CLINIC | Age: 35
End: 2020-05-07

## 2020-05-08 ENCOUNTER — TELEPHONE (OUTPATIENT)
Dept: CCU | Facility: HOSPITAL | Age: 35
End: 2020-05-08

## 2020-05-10 ENCOUNTER — TELEPHONE (OUTPATIENT)
Dept: CCU | Facility: HOSPITAL | Age: 35
End: 2020-05-10

## 2020-05-19 DIAGNOSIS — E06.3 HYPOTHYROIDISM DUE TO HASHIMOTO'S THYROIDITIS: ICD-10-CM

## 2020-05-19 DIAGNOSIS — E03.8 HYPOTHYROIDISM DUE TO HASHIMOTO'S THYROIDITIS: ICD-10-CM

## 2020-05-19 RX ORDER — LEVOTHYROXINE SODIUM 0.07 MG/1
75 TABLET ORAL DAILY
Qty: 90 TABLET | Refills: 1 | Status: SHIPPED | OUTPATIENT
Start: 2020-05-19 | End: 2020-08-06 | Stop reason: SDUPTHER

## 2020-05-20 DIAGNOSIS — F41.8 ANXIETY WITH DEPRESSION: ICD-10-CM

## 2020-05-20 DIAGNOSIS — E06.3 HYPOTHYROIDISM DUE TO HASHIMOTO'S THYROIDITIS: ICD-10-CM

## 2020-05-20 DIAGNOSIS — E03.8 HYPOTHYROIDISM DUE TO HASHIMOTO'S THYROIDITIS: ICD-10-CM

## 2020-05-20 DIAGNOSIS — E55.9 VITAMIN D DEFICIENCY: ICD-10-CM

## 2020-05-20 RX ORDER — ESCITALOPRAM OXALATE 10 MG/1
10 TABLET ORAL DAILY
Qty: 90 TABLET | Refills: 0 | Status: SHIPPED | OUTPATIENT
Start: 2020-05-20 | End: 2020-05-20 | Stop reason: SDUPTHER

## 2020-05-20 RX ORDER — LEVOTHYROXINE SODIUM 0.07 MG/1
75 TABLET ORAL DAILY
Qty: 90 TABLET | Refills: 0 | Status: CANCELLED | OUTPATIENT
Start: 2020-05-20 | End: 2020-11-16

## 2020-05-20 RX ORDER — ERGOCALCIFEROL (VITAMIN D2) 1250 MCG
50000 CAPSULE ORAL WEEKLY
Qty: 12 CAPSULE | Refills: 0 | Status: SHIPPED | OUTPATIENT
Start: 2020-05-20 | End: 2022-03-02

## 2020-05-20 RX ORDER — ESCITALOPRAM OXALATE 10 MG/1
10 TABLET ORAL DAILY
Qty: 90 TABLET | Refills: 0 | Status: SHIPPED | OUTPATIENT
Start: 2020-05-20 | End: 2020-05-26 | Stop reason: ALTCHOICE

## 2020-05-26 ENCOUNTER — HOSPITAL ENCOUNTER (EMERGENCY)
Facility: HOSPITAL | Age: 35
Discharge: HOME | End: 2020-05-26
Attending: EMERGENCY MEDICINE | Admitting: EMERGENCY MEDICINE
Payer: COMMERCIAL

## 2020-05-26 VITALS
OXYGEN SATURATION: 99 % | TEMPERATURE: 97.7 F | HEART RATE: 86 BPM | SYSTOLIC BLOOD PRESSURE: 123 MMHG | BODY MASS INDEX: 46.61 KG/M2 | DIASTOLIC BLOOD PRESSURE: 59 MMHG | WEIGHT: 284.39 LBS | RESPIRATION RATE: 18 BRPM

## 2020-05-26 DIAGNOSIS — S01.81XA LACERATION OF FOREHEAD, INITIAL ENCOUNTER: ICD-10-CM

## 2020-05-26 DIAGNOSIS — S60.811A ABRASION OF RIGHT WRIST, INITIAL ENCOUNTER: ICD-10-CM

## 2020-05-26 DIAGNOSIS — S09.90XA CLOSED HEAD INJURY, INITIAL ENCOUNTER: Primary | ICD-10-CM

## 2020-05-26 PROCEDURE — 99283 EMERGENCY DEPT VISIT LOW MDM: CPT

## 2020-05-26 PROCEDURE — 99284 EMERGENCY DEPT VISIT MOD MDM: CPT | Performed by: PHYSICIAN ASSISTANT

## 2020-05-26 PROCEDURE — 12011 RPR F/E/E/N/L/M 2.5 CM/<: CPT | Performed by: PHYSICIAN ASSISTANT

## 2020-06-08 ENCOUNTER — TELEPHONE (OUTPATIENT)
Dept: FAMILY MEDICINE CLINIC | Facility: CLINIC | Age: 35
End: 2020-06-08

## 2020-07-29 DIAGNOSIS — E06.3 HYPOTHYROIDISM DUE TO HASHIMOTO'S THYROIDITIS: Primary | ICD-10-CM

## 2020-07-29 DIAGNOSIS — L65.9 HAIR LOSS: Primary | ICD-10-CM

## 2020-07-29 DIAGNOSIS — E03.8 HYPOTHYROIDISM DUE TO HASHIMOTO'S THYROIDITIS: Primary | ICD-10-CM

## 2020-07-31 ENCOUNTER — APPOINTMENT (OUTPATIENT)
Dept: LAB | Age: 35
End: 2020-07-31
Payer: COMMERCIAL

## 2020-07-31 DIAGNOSIS — E06.3 HYPOTHYROIDISM DUE TO HASHIMOTO'S THYROIDITIS: ICD-10-CM

## 2020-07-31 DIAGNOSIS — E55.9 VITAMIN D DEFICIENCY: ICD-10-CM

## 2020-07-31 DIAGNOSIS — E03.8 HYPOTHYROIDISM DUE TO HASHIMOTO'S THYROIDITIS: ICD-10-CM

## 2020-07-31 DIAGNOSIS — L65.9 HAIR LOSS: ICD-10-CM

## 2020-07-31 LAB
25(OH)D3 SERPL-MCNC: 22.8 NG/ML (ref 30–100)
FERRITIN SERPL-MCNC: 9 NG/ML (ref 8–388)
T3 SERPL-MCNC: 1.3 NG/ML (ref 0.6–1.8)
T4 FREE SERPL-MCNC: 1.28 NG/DL (ref 0.76–1.46)
TSH SERPL DL<=0.05 MIU/L-ACNC: 4.41 UIU/ML (ref 0.36–3.74)

## 2020-07-31 PROCEDURE — 84482 T3 REVERSE: CPT

## 2020-07-31 PROCEDURE — 84439 ASSAY OF FREE THYROXINE: CPT

## 2020-07-31 PROCEDURE — 82728 ASSAY OF FERRITIN: CPT

## 2020-07-31 PROCEDURE — 82306 VITAMIN D 25 HYDROXY: CPT

## 2020-07-31 PROCEDURE — 84480 ASSAY TRIIODOTHYRONINE (T3): CPT

## 2020-07-31 PROCEDURE — 84443 ASSAY THYROID STIM HORMONE: CPT

## 2020-07-31 PROCEDURE — 36415 COLL VENOUS BLD VENIPUNCTURE: CPT

## 2020-08-03 LAB — T3REVERSE SERPL-MCNC: 19 NG/DL (ref 9.2–24.1)

## 2020-08-06 ENCOUNTER — TELEMEDICINE (OUTPATIENT)
Dept: ENDOCRINOLOGY | Facility: CLINIC | Age: 35
End: 2020-08-06
Payer: COMMERCIAL

## 2020-08-06 DIAGNOSIS — E03.8 HYPOTHYROIDISM DUE TO HASHIMOTO'S THYROIDITIS: Primary | ICD-10-CM

## 2020-08-06 DIAGNOSIS — E06.3 HYPOTHYROIDISM DUE TO HASHIMOTO'S THYROIDITIS: Primary | ICD-10-CM

## 2020-08-06 DIAGNOSIS — E61.1 IRON DEFICIENCY: ICD-10-CM

## 2020-08-06 DIAGNOSIS — E04.2 MULTIPLE THYROID NODULES: ICD-10-CM

## 2020-08-06 DIAGNOSIS — L65.9 HAIR LOSS: ICD-10-CM

## 2020-08-06 DIAGNOSIS — E55.9 VITAMIN D DEFICIENCY: ICD-10-CM

## 2020-08-06 PROCEDURE — 99213 OFFICE O/P EST LOW 20 MIN: CPT | Performed by: PHYSICIAN ASSISTANT

## 2020-08-06 PROCEDURE — 1036F TOBACCO NON-USER: CPT | Performed by: PHYSICIAN ASSISTANT

## 2020-08-06 RX ORDER — LEVOTHYROXINE SODIUM 88 UG/1
88 TABLET ORAL DAILY
Qty: 90 TABLET | Refills: 1 | Status: SHIPPED | OUTPATIENT
Start: 2020-08-06 | End: 2020-11-02 | Stop reason: SDUPTHER

## 2020-08-06 RX ORDER — FERROUS SULFATE TAB EC 324 MG (65 MG FE EQUIVALENT) 324 (65 FE) MG
TABLET DELAYED RESPONSE ORAL
Start: 2020-08-06

## 2020-08-06 RX ORDER — MAG HYDROX/ALUMINUM HYD/SIMETH 400-400-40
SUSPENSION, ORAL (FINAL DOSE FORM) ORAL
Qty: 90 CAPSULE | Refills: 3
Start: 2020-08-06 | End: 2021-10-14 | Stop reason: ALTCHOICE

## 2020-08-06 NOTE — PROGRESS NOTES
Virtual Regular Visit      Assessment/Plan:    Problem List Items Addressed This Visit        Endocrine    Hypothyroidism due to Hashimoto's thyroiditis - Primary     Increase levothyroxine to 88mcg daily  Check TSH/Free T4 in 6 weeks  Relevant Medications    levothyroxine 88 mcg tablet    Other Relevant Orders    TSH, 3rd generation    T4, free    Multiple thyroid nodules     Repeat ultrasound 6/2021  Relevant Medications    levothyroxine 88 mcg tablet       Other    Vitamin D deficiency     Resume supplements 5,000 units daily  Relevant Medications    Cholecalciferol (Vitamin D3) 125 MCG (5000 UT) CAPS    Hair loss     Likely due to hypothyroidism, iron deficiency, and vitamin d deficiency  Relevant Orders    CBC and differential    Iron deficiency     Check CBC with diff to eval for anemia  For now start OTC Ferrous sulfate 325mg daily with dinner, follow up with family physician for continued care and monitoring  Relevant Medications    ferrous sulfate 324 (65 Fe) mg    Other Relevant Orders    CBC and differential               Reason for visit is   Chief Complaint   Patient presents with    Virtual Regular Visit        Encounter provider Anh Lind PA-C    Provider located at [address] [place] [phone]      Recent Visits  No visits were found meeting these conditions  Showing recent visits within past 7 days and meeting all other requirements     Today's Visits  Date Type Provider Dept   08/06/20 Telemedicine Anh Lind PA-C [facility] [address] [place] [address] today's visits and meeting all other requirements     Future Appointments  No visits were found meeting these conditions     Showing future appointments within next 150 days and meeting all other requirements        The patient was identified by name and date of birth  Good Edwards was informed that this is a telemedicine visit and that the visit is being conducted through telephone  My office door was closed  No one else was in the room  *  She acknowledged consent and understanding of privacy and security of the video platform  The patient has agreed to participate and understands they can discontinue the visit at any time  It was my intent to perform this visit via video technology but the patient was not able to do a video connection so the visit was completed via audio telephone only  Patient is aware this is a billable service  Subjective  Good Edwards is a 30 y o  female with hypothyroidism, Vitamin D Deficiency, and Thyroid Nodules  Since last visit she was dx with covid and recovered a few months ago, did not require hospitalization  For hypothyroidism, taking levothyroxine 75mcg daily  She does report hair thinning and difficultly losing weight  Also reporting joint aches  Periods have been regular  They are heavy for 1-2 days  Denies Dark/blood stools  For Vitamin D Deficiency, she is off the supplements  For thyroid nodules, she is due for ultrasound next year  Denies dysphagia  Has prior fna which showed no malignancy  Denies FH of thyroid CA         HPI     Past Medical History:   Diagnosis Date    Disease of thyroid gland     Obesity        Past Surgical History:   Procedure Laterality Date    US GUIDED THYROID BIOPSY  11/13/2018       Current Outpatient Medications   Medication Sig Dispense Refill    Cholecalciferol (Vitamin D3) 125 MCG (5000 UT) CAPS 1 cap daily 90 capsule 3    ergocalciferol (ERGOCALCIFEROL) 1 25 MG (83818 UT) capsule Take 1 capsule (50,000 Units total) by mouth once a week 12 capsule 0    ferrous sulfate 324 (65 Fe) mg 1 tab daily at dinner      levothyroxine 88 mcg tablet Take 1 tablet (88 mcg total) by mouth daily 90 tablet 1     No current facility-administered medications for this visit  No Known Allergies    Review of Systems   Constitutional: Negative for activity change, appetite change, chills, diaphoresis, fatigue, fever and unexpected weight change  HENT: Negative for trouble swallowing and voice change  Eyes: Negative for visual disturbance  Respiratory: Negative for shortness of breath  Cardiovascular: Negative for chest pain and palpitations  Gastrointestinal: Negative for abdominal pain, constipation and diarrhea  Endocrine: Negative for cold intolerance, heat intolerance, polydipsia, polyphagia and polyuria  Genitourinary: Negative for frequency and menstrual problem  Musculoskeletal: Positive for arthralgias  Negative for myalgias  Skin: Negative for rash  Dry skin   Allergic/Immunologic: Negative for food allergies  Neurological: Negative for dizziness and tremors  Hematological: Negative for adenopathy  Psychiatric/Behavioral: Negative for sleep disturbance  All other systems reviewed and are negative  Video Exam    There were no vitals filed for this visit  Physical Exam   Unable    Component      Latest Ref Rng & Units 8/22/2019 8/22/2019 8/28/2019 8/28/2019          11:34 AM 11:34 AM 10:13 AM 10:46 AM   Hemoglobin      11 5 - 15 4 g/dL       HCT      34 8 - 46 1 %       TSH 3RD GENERATON      0 358 - 3 740 uIU/mL 1 863      Vit D, 25-Hydroxy      30 0 - 100 0 ng/mL  18 9 (L)     URINE HCG         neg    PROLACTIN      ng/mL       INSULIN, FASTING      3 0 - 25 0 mU/L    12 8   Free T4      0 76 - 1 46 ng/dL       T3 REVERSE      9 2 - 24 1 ng/dL       T3, Total      0 60 - 1 80 ng/mL       Ferritin      8 - 388 ng/mL         Component      Latest Ref Rng & Units 8/28/2019 10/22/2019 10/22/2019 7/31/2020          10:46 AM  4:59 PM  4:59 PM  9:51 AM   Hemoglobin      11 5 - 15 4 g/dL  13 0     HCT      34 8 - 46 1 %  40 5     TSH 3RD GENERATON      0 358 - 3 740 uIU/mL   2  198    Vit D, 25-Hydroxy      30 0 - 100 0 ng/mL    22 8 (L)   URINE HCG             PROLACTIN      ng/mL 14 5      INSULIN, FASTING      3 0 - 25 0 mU/L       Free T4      0 76 - 1 46 ng/dL       T3 REVERSE      9 2 - 24 1 ng/dL       T3, Total      0 60 - 1 80 ng/mL       Ferritin      8 - 388 ng/mL         Component      Latest Ref Rng & Units 7/31/2020 7/31/2020 7/31/2020 7/31/2020           9:51 AM  9:51 AM  9:51 AM  9:51 AM   Hemoglobin      11 5 - 15 4 g/dL       HCT      34 8 - 46 1 %       TSH 3RD GENERATON      0 358 - 3 740 uIU/mL   4 410 (H)    Vit D, 25-Hydroxy      30 0 - 100 0 ng/mL       URINE HCG             PROLACTIN      ng/mL       INSULIN, FASTING      3 0 - 25 0 mU/L       Free T4      0 76 - 1 46 ng/dL    1 28   T3 REVERSE      9 2 - 24 1 ng/dL       T3, Total      0 60 - 1 80 ng/mL 1 30      Ferritin      8 - 388 ng/mL  9       Component      Latest Ref Rng & Units 7/31/2020           9:51 AM   Hemoglobin      11 5 - 15 4 g/dL    HCT      34 8 - 46 1 %    TSH 3RD GENERATON      0 358 - 3 740 uIU/mL    Vit D, 25-Hydroxy      30 0 - 100 0 ng/mL    URINE HCG          PROLACTIN      ng/mL    INSULIN, FASTING      3 0 - 25 0 mU/L    Free T4      0 76 - 1 46 ng/dL    T3 REVERSE      9 2 - 24 1 ng/dL 19 0   T3, Total      0 60 - 1 80 ng/mL    Ferritin      8 - 388 ng/mL        I spent 15 minutes with patient today in which greater than 50% of the time was spent in counseling/coordination of care regarding Lab results and treatment plan and plans for follow up  VIRTUAL VISIT DISCLAIMER    Jeannene Lesches acknowledges that she has consented to an online visit or consultation  She understands that the online visit is based solely on information provided by her, and that, in the absence of a face-to-face physical evaluation by the physician, the diagnosis she receives is both limited and provisional in terms of accuracy and completeness  This is not intended to replace a full medical face-to-face evaluation by the physician   Migel Lay Nahid understands and accepts these terms

## 2020-08-06 NOTE — ASSESSMENT & PLAN NOTE
Check CBC with diff to eval for anemia  For now start OTC Ferrous sulfate 325mg daily with dinner, follow up with family physician for continued care and monitoring

## 2020-10-12 ENCOUNTER — LAB (OUTPATIENT)
Dept: LAB | Age: 35
End: 2020-10-12
Payer: COMMERCIAL

## 2020-10-12 DIAGNOSIS — L65.9 HAIR LOSS: ICD-10-CM

## 2020-10-12 DIAGNOSIS — E03.8 HYPOTHYROIDISM DUE TO HASHIMOTO'S THYROIDITIS: ICD-10-CM

## 2020-10-12 DIAGNOSIS — E61.1 IRON DEFICIENCY: ICD-10-CM

## 2020-10-12 DIAGNOSIS — E06.3 HYPOTHYROIDISM DUE TO HASHIMOTO'S THYROIDITIS: ICD-10-CM

## 2020-10-12 LAB
BASOPHILS # BLD AUTO: 0.06 THOUSANDS/ΜL (ref 0–0.1)
BASOPHILS NFR BLD AUTO: 1 % (ref 0–1)
EOSINOPHIL # BLD AUTO: 0.17 THOUSAND/ΜL (ref 0–0.61)
EOSINOPHIL NFR BLD AUTO: 2 % (ref 0–6)
ERYTHROCYTE [DISTWIDTH] IN BLOOD BY AUTOMATED COUNT: 15.2 % (ref 11.6–15.1)
HCT VFR BLD AUTO: 37.8 % (ref 34.8–46.1)
HGB BLD-MCNC: 12.1 G/DL (ref 11.5–15.4)
IMM GRANULOCYTES # BLD AUTO: 0.04 THOUSAND/UL (ref 0–0.2)
IMM GRANULOCYTES NFR BLD AUTO: 0 % (ref 0–2)
LYMPHOCYTES # BLD AUTO: 2.86 THOUSANDS/ΜL (ref 0.6–4.47)
LYMPHOCYTES NFR BLD AUTO: 26 % (ref 14–44)
MCH RBC QN AUTO: 27 PG (ref 26.8–34.3)
MCHC RBC AUTO-ENTMCNC: 32 G/DL (ref 31.4–37.4)
MCV RBC AUTO: 84 FL (ref 82–98)
MONOCYTES # BLD AUTO: 0.73 THOUSAND/ΜL (ref 0.17–1.22)
MONOCYTES NFR BLD AUTO: 7 % (ref 4–12)
NEUTROPHILS # BLD AUTO: 6.99 THOUSANDS/ΜL (ref 1.85–7.62)
NEUTS SEG NFR BLD AUTO: 64 % (ref 43–75)
NRBC BLD AUTO-RTO: 0 /100 WBCS
PLATELET # BLD AUTO: 341 THOUSANDS/UL (ref 149–390)
PMV BLD AUTO: 11.1 FL (ref 8.9–12.7)
RBC # BLD AUTO: 4.48 MILLION/UL (ref 3.81–5.12)
T4 FREE SERPL-MCNC: 1.26 NG/DL (ref 0.76–1.46)
TSH SERPL DL<=0.05 MIU/L-ACNC: 2.94 UIU/ML (ref 0.36–3.74)
WBC # BLD AUTO: 10.85 THOUSAND/UL (ref 4.31–10.16)

## 2020-10-12 PROCEDURE — 82728 ASSAY OF FERRITIN: CPT

## 2020-10-12 PROCEDURE — 36415 COLL VENOUS BLD VENIPUNCTURE: CPT

## 2020-10-12 PROCEDURE — 84443 ASSAY THYROID STIM HORMONE: CPT

## 2020-10-12 PROCEDURE — 84439 ASSAY OF FREE THYROXINE: CPT

## 2020-10-12 PROCEDURE — 85025 COMPLETE CBC W/AUTO DIFF WBC: CPT

## 2020-10-13 DIAGNOSIS — E61.1 IRON DEFICIENCY: Primary | ICD-10-CM

## 2020-10-14 LAB — FERRITIN SERPL-MCNC: 6 NG/ML (ref 8–388)

## 2020-10-15 ENCOUNTER — ANNUAL EXAM (OUTPATIENT)
Dept: OBGYN CLINIC | Facility: MEDICAL CENTER | Age: 35
End: 2020-10-15
Payer: COMMERCIAL

## 2020-10-15 ENCOUNTER — LAB (OUTPATIENT)
Dept: LAB | Facility: MEDICAL CENTER | Age: 35
End: 2020-10-15
Payer: COMMERCIAL

## 2020-10-15 VITALS
HEIGHT: 66 IN | SYSTOLIC BLOOD PRESSURE: 120 MMHG | WEIGHT: 286 LBS | BODY MASS INDEX: 45.96 KG/M2 | DIASTOLIC BLOOD PRESSURE: 76 MMHG | TEMPERATURE: 97.2 F

## 2020-10-15 DIAGNOSIS — Z72.51 UNPROTECTED SEX: ICD-10-CM

## 2020-10-15 DIAGNOSIS — R39.9 UTI SYMPTOMS: Primary | ICD-10-CM

## 2020-10-15 DIAGNOSIS — Z11.3 SCREENING EXAMINATION FOR STD (SEXUALLY TRANSMITTED DISEASE): ICD-10-CM

## 2020-10-15 LAB
SL AMB  POCT GLUCOSE, UA: ABNORMAL
SL AMB LEUKOCYTE ESTERASE,UA: ABNORMAL
SL AMB POCT BILIRUBIN,UA: ABNORMAL
SL AMB POCT BLOOD,UA: ABNORMAL
SL AMB POCT CLARITY,UA: ABNORMAL
SL AMB POCT COLOR,UA: YELLOW
SL AMB POCT KETONES,UA: ABNORMAL
SL AMB POCT NITRITE,UA: POSITIVE
SL AMB POCT PH,UA: 6
SL AMB POCT SPECIFIC GRAVITY,UA: 1.01
SL AMB POCT URINE HCG: NEGATIVE
SL AMB POCT URINE PROTEIN: ABNORMAL
SL AMB POCT UROBILINOGEN: 0.2

## 2020-10-15 PROCEDURE — 99395 PREV VISIT EST AGE 18-39: CPT | Performed by: NURSE PRACTITIONER

## 2020-10-15 PROCEDURE — 81025 URINE PREGNANCY TEST: CPT | Performed by: NURSE PRACTITIONER

## 2020-10-15 PROCEDURE — 87591 N.GONORRHOEAE DNA AMP PROB: CPT | Performed by: NURSE PRACTITIONER

## 2020-10-15 PROCEDURE — 87389 HIV-1 AG W/HIV-1&-2 AB AG IA: CPT

## 2020-10-15 PROCEDURE — 86592 SYPHILIS TEST NON-TREP QUAL: CPT

## 2020-10-15 PROCEDURE — 87491 CHLMYD TRACH DNA AMP PROBE: CPT | Performed by: NURSE PRACTITIONER

## 2020-10-15 PROCEDURE — 36415 COLL VENOUS BLD VENIPUNCTURE: CPT

## 2020-10-15 PROCEDURE — 81002 URINALYSIS NONAUTO W/O SCOPE: CPT | Performed by: NURSE PRACTITIONER

## 2020-10-15 PROCEDURE — 87340 HEPATITIS B SURFACE AG IA: CPT

## 2020-10-15 RX ORDER — NITROFURANTOIN 25; 75 MG/1; MG/1
100 CAPSULE ORAL 2 TIMES DAILY
Qty: 10 CAPSULE | Refills: 0 | Status: SHIPPED | OUTPATIENT
Start: 2020-10-15 | End: 2021-10-14 | Stop reason: ALTCHOICE

## 2020-10-16 LAB
HBV SURFACE AG SER QL: NORMAL
HIV 1+2 AB+HIV1 P24 AG SERPL QL IA: NORMAL
RPR SER QL: NORMAL

## 2020-10-19 LAB
C TRACH DNA SPEC QL NAA+PROBE: NEGATIVE
N GONORRHOEA DNA SPEC QL NAA+PROBE: NEGATIVE

## 2020-11-02 DIAGNOSIS — E03.8 HYPOTHYROIDISM DUE TO HASHIMOTO'S THYROIDITIS: ICD-10-CM

## 2020-11-02 DIAGNOSIS — E06.3 HYPOTHYROIDISM DUE TO HASHIMOTO'S THYROIDITIS: ICD-10-CM

## 2020-11-02 RX ORDER — LEVOTHYROXINE SODIUM 88 UG/1
88 TABLET ORAL DAILY
Qty: 90 TABLET | Refills: 0 | Status: SHIPPED | OUTPATIENT
Start: 2020-11-02 | End: 2021-02-08 | Stop reason: SDUPTHER

## 2020-11-09 ENCOUNTER — OFFICE VISIT (OUTPATIENT)
Dept: ENDOCRINOLOGY | Facility: CLINIC | Age: 35
End: 2020-11-09
Payer: COMMERCIAL

## 2020-11-09 VITALS
TEMPERATURE: 98.7 F | DIASTOLIC BLOOD PRESSURE: 68 MMHG | HEART RATE: 60 BPM | SYSTOLIC BLOOD PRESSURE: 102 MMHG | WEIGHT: 289.8 LBS | BODY MASS INDEX: 46.57 KG/M2 | HEIGHT: 66 IN

## 2020-11-09 DIAGNOSIS — E04.2 MULTIPLE THYROID NODULES: Primary | ICD-10-CM

## 2020-11-09 PROCEDURE — 99214 OFFICE O/P EST MOD 30 MIN: CPT | Performed by: INTERNAL MEDICINE

## 2020-11-12 ENCOUNTER — IMMUNIZATIONS (OUTPATIENT)
Dept: FAMILY MEDICINE CLINIC | Facility: CLINIC | Age: 35
End: 2020-11-12
Payer: COMMERCIAL

## 2020-11-12 DIAGNOSIS — Z23 ENCOUNTER FOR IMMUNIZATION: ICD-10-CM

## 2020-11-12 PROCEDURE — 90686 IIV4 VACC NO PRSV 0.5 ML IM: CPT

## 2020-11-12 PROCEDURE — 90471 IMMUNIZATION ADMIN: CPT

## 2020-12-22 DIAGNOSIS — N92.6 IRREGULAR PERIODS: ICD-10-CM

## 2020-12-22 DIAGNOSIS — E04.2 MULTIPLE THYROID NODULES: Primary | ICD-10-CM

## 2021-01-26 ENCOUNTER — LAB (OUTPATIENT)
Dept: LAB | Age: 36
End: 2021-01-26
Payer: COMMERCIAL

## 2021-01-26 DIAGNOSIS — E04.2 MULTIPLE THYROID NODULES: ICD-10-CM

## 2021-01-26 DIAGNOSIS — N92.6 IRREGULAR PERIODS: ICD-10-CM

## 2021-01-26 LAB — PROLACTIN SERPL-MCNC: 18.2 NG/ML

## 2021-01-26 PROCEDURE — 36415 COLL VENOUS BLD VENIPUNCTURE: CPT

## 2021-01-26 PROCEDURE — 84402 ASSAY OF FREE TESTOSTERONE: CPT

## 2021-01-26 PROCEDURE — 84146 ASSAY OF PROLACTIN: CPT

## 2021-01-26 PROCEDURE — 84403 ASSAY OF TOTAL TESTOSTERONE: CPT

## 2021-01-27 LAB
TESTOST FREE SERPL-MCNC: 2.4 PG/ML (ref 0–4.2)
TESTOST SERPL-MCNC: 36 NG/DL (ref 8–48)

## 2021-01-28 ENCOUNTER — TELEPHONE (OUTPATIENT)
Dept: ENDOCRINOLOGY | Facility: CLINIC | Age: 36
End: 2021-01-28

## 2021-01-28 NOTE — TELEPHONE ENCOUNTER
----- Message from Neil Baez PA-C sent at 1/27/2021  5:47 PM EST -----  Testosterone and prolactin levels are normal

## 2021-02-03 ENCOUNTER — TELEMEDICINE (OUTPATIENT)
Dept: FAMILY MEDICINE CLINIC | Facility: CLINIC | Age: 36
End: 2021-02-03
Payer: COMMERCIAL

## 2021-02-03 DIAGNOSIS — J02.9 PHARYNGITIS, UNSPECIFIED ETIOLOGY: Primary | ICD-10-CM

## 2021-02-03 PROCEDURE — 99213 OFFICE O/P EST LOW 20 MIN: CPT | Performed by: FAMILY MEDICINE

## 2021-02-03 RX ORDER — AZITHROMYCIN 250 MG/1
TABLET, FILM COATED ORAL
Qty: 6 TABLET | Refills: 0 | Status: SHIPPED | OUTPATIENT
Start: 2021-02-03 | End: 2021-02-07

## 2021-02-03 NOTE — PATIENT INSTRUCTIONS

## 2021-02-03 NOTE — PROGRESS NOTES
Virtual Regular Visit      Assessment/Plan:    Problem List Items Addressed This Visit     None      Visit Diagnoses     Pharyngitis, unspecified etiology    -  Primary    Relevant Medications    azithromycin (ZITHROMAX) 250 mg tablet          uri  Reason for visit is   Chief Complaint   Patient presents with    Virtual Regular Visit        Encounter provider Som Cannon DO    Provider located at [address] [place] [id number]      Recent Visits  No visits were found meeting these conditions  Showing recent visits within past 7 days and meeting all other requirements     Today's Visits  Date Type Provider Dept   02/03/21 Telemedicine Som Cannon DO [facility] today's visits and meeting all other requirements     Future Appointments  No visits were found meeting these conditions  Showing future appointments within next 150 days and meeting all other requirements        The patient was identified by name and date of birth  Abril Rico was informed that this is a telemedicine visit and that the visit is being conducted through [facility] and patient was informed that this is a secure, HIPAA-compliant platform  She agrees to proceed     My office door was closed  No one else was in the room  She acknowledged consent and understanding of privacy and security of the video platform  The patient has agreed to participate and understands they can discontinue the visit at any time  Patient is aware this is a billable service  Subjective  Abril Rico is a 37 y o  female sore throat   14-year-old patient who has symptoms of sore throat referring to ear pain  Sinus pressure frontal   Had COVID in March of 2020  She is off until Monday  She will be starting a new job at [employer]  Patient is hydrating  Her 3 children at home and are not sick  No work exposure to her knowledge    Past Medical History:   Diagnosis Date    Disease of thyroid gland     Obesity        Past Surgical History:   Procedure Laterality Date    US GUIDED THYROID BIOPSY  11/13/2018       Current Outpatient Medications   Medication Sig Dispense Refill    azithromycin (ZITHROMAX) 250 mg tablet Take 2 tablets today then 1 tablet daily x 4 days 6 tablet 0    Cholecalciferol (Vitamin D3) 125 MCG (5000 UT) CAPS 1 cap daily (Patient not taking: Reported on 11/9/2020) 90 capsule 3    ergocalciferol (ERGOCALCIFEROL) 1 25 MG (25757 UT) capsule Take 1 capsule (50,000 Units total) by mouth once a week (Patient not taking: Reported on 10/15/2020) 12 capsule 0    ferrous sulfate 324 (65 Fe) mg 1 tab daily at dinner      levothyroxine 88 mcg tablet Take 1 tablet (88 mcg total) by mouth daily 90 tablet 0    nitrofurantoin (MACROBID) 100 mg capsule Take 1 capsule (100 mg total) by mouth 2 (two) times a day (Patient not taking: Reported on 11/9/2020) 10 capsule 0     No current facility-administered medications for this visit  No Known Allergies    Review of Systems   Constitutional: Negative for fever  Had covid in March 2020   HENT: Positive for sinus pressure and sore throat  Neurological: Positive for headaches (perssure)  Video Exam    There were no vitals filed for this visit  Physical Exam  Constitutional:       Appearance: Normal appearance  She is not ill-appearing or toxic-appearing  Pulmonary:      Effort: Pulmonary effort is normal    Neurological:      Mental Status: She is alert and oriented to person, place, and time  Psychiatric:         Mood and Affect: Mood normal           I spent 10 minutes with patient today in which greater than 50% of the time was spent in counseling/coordination of care regarding Sore throat symptoms      VIRTUAL VISIT DISCLAIMER    Brit Clarissa acknowledges that she has consented to an online visit or consultation   She understands that the online visit is based solely on information provided by her, and that, in the absence of a face-to-face physical evaluation by the physician, the diagnosis she receives is both limited and provisional in terms of accuracy and completeness  This is not intended to replace a full medical face-to-face evaluation by the physician  Malathi Reyna understands and accepts these terms

## 2021-02-04 ENCOUNTER — TELEMEDICINE (OUTPATIENT)
Dept: FAMILY MEDICINE CLINIC | Facility: CLINIC | Age: 36
End: 2021-02-04
Payer: COMMERCIAL

## 2021-02-04 ENCOUNTER — TELEPHONE (OUTPATIENT)
Dept: FAMILY MEDICINE CLINIC | Facility: CLINIC | Age: 36
End: 2021-02-04

## 2021-02-04 DIAGNOSIS — J02.9 PHARYNGITIS, UNSPECIFIED ETIOLOGY: Primary | ICD-10-CM

## 2021-02-04 PROCEDURE — 99441 PR PHYS/QHP TELEPHONE EVALUATION 5-10 MIN: CPT | Performed by: FAMILY MEDICINE

## 2021-02-04 RX ORDER — PREDNISONE 10 MG/1
TABLET ORAL
Qty: 21 EACH | Refills: 0 | Status: SHIPPED | OUTPATIENT
Start: 2021-02-04 | End: 2021-10-14 | Stop reason: ALTCHOICE

## 2021-02-04 NOTE — TELEPHONE ENCOUNTER
Patient states she sent a message to you through [facility] but wanted to call as she had a bad night, she has a sore throat, swollen lymph node right side of her neck which is painful, sweats, and chills    She is asking if you can possibly order tests for covid/flu/strep, etc   Please advise, thanks

## 2021-02-04 NOTE — PROGRESS NOTES
Romeo Carvajal       2/4/21 11:45 AM  Note     Patient states she sent a message to you through [address] but wanted to call as she had a bad night, she has a sore throat, swollen lymph node right side of her neck which is painful, sweats, and chills  She is asking if you can possibly order tests for covid/flu/strep, etc   Please advise, thanks         patient call the office with this complaint  I did a quick  Virtual conversation  She was in her car driving around trying to get some fresh air  She had a better morning  I discussed with her that the strep testing would probably be invalid because she already started the antibiotic last night  She had a dose this morning also  She has a history of COVID so I really do not think that this is recurrence of COVID  Certainly could be component of flu but we would treat that supportively anyway  I did discuss the use of prednisone taper to help with her symptoms  She is in agreement and that has been sent to   Irma Traore

## 2021-02-08 DIAGNOSIS — E03.8 HYPOTHYROIDISM DUE TO HASHIMOTO'S THYROIDITIS: ICD-10-CM

## 2021-02-08 DIAGNOSIS — E06.3 HYPOTHYROIDISM DUE TO HASHIMOTO'S THYROIDITIS: ICD-10-CM

## 2021-02-08 RX ORDER — LEVOTHYROXINE SODIUM 88 UG/1
88 TABLET ORAL DAILY
Qty: 90 TABLET | Refills: 0 | Status: SHIPPED | OUTPATIENT
Start: 2021-02-08 | End: 2021-05-07 | Stop reason: SDUPTHER

## 2021-03-25 ENCOUNTER — IMMUNIZATIONS (OUTPATIENT)
Dept: FAMILY MEDICINE CLINIC | Facility: HOSPITAL | Age: 36
End: 2021-03-25

## 2021-03-25 DIAGNOSIS — Z23 ENCOUNTER FOR IMMUNIZATION: Primary | ICD-10-CM

## 2021-03-25 PROCEDURE — 91300 SARS-COV-2 / COVID-19 MRNA VACCINE (PFIZER-BIONTECH) 30 MCG: CPT

## 2021-03-25 PROCEDURE — 0001A SARS-COV-2 / COVID-19 MRNA VACCINE (PFIZER-BIONTECH) 30 MCG: CPT

## 2021-04-15 ENCOUNTER — IMMUNIZATIONS (OUTPATIENT)
Dept: FAMILY MEDICINE CLINIC | Facility: HOSPITAL | Age: 36
End: 2021-04-15

## 2021-04-15 DIAGNOSIS — Z23 ENCOUNTER FOR IMMUNIZATION: Primary | ICD-10-CM

## 2021-04-15 PROCEDURE — 91300 SARS-COV-2 / COVID-19 MRNA VACCINE (PFIZER-BIONTECH) 30 MCG: CPT

## 2021-04-15 PROCEDURE — 0002A SARS-COV-2 / COVID-19 MRNA VACCINE (PFIZER-BIONTECH) 30 MCG: CPT

## 2021-05-07 DIAGNOSIS — E03.8 HYPOTHYROIDISM DUE TO HASHIMOTO'S THYROIDITIS: ICD-10-CM

## 2021-05-07 DIAGNOSIS — E06.3 HYPOTHYROIDISM DUE TO HASHIMOTO'S THYROIDITIS: ICD-10-CM

## 2021-05-10 RX ORDER — LEVOTHYROXINE SODIUM 88 UG/1
88 TABLET ORAL DAILY
Qty: 90 TABLET | Refills: 0 | Status: SHIPPED | OUTPATIENT
Start: 2021-05-10 | End: 2021-08-27 | Stop reason: SDUPTHER

## 2021-06-22 ENCOUNTER — HOSPITAL ENCOUNTER (OUTPATIENT)
Dept: ULTRASOUND IMAGING | Facility: HOSPITAL | Age: 36
Discharge: HOME | End: 2021-06-22
Payer: COMMERCIAL

## 2021-06-22 ENCOUNTER — APPOINTMENT (OUTPATIENT)
Dept: LAB | Age: 36
End: 2021-06-22

## 2021-06-22 ENCOUNTER — APPOINTMENT (OUTPATIENT)
Dept: LAB | Age: 36
End: 2021-06-22
Payer: COMMERCIAL

## 2021-06-22 DIAGNOSIS — E04.2 MULTIPLE THYROID NODULES: ICD-10-CM

## 2021-06-22 DIAGNOSIS — Z00.8 HEALTH EXAMINATION IN POPULATION SURVEYS: ICD-10-CM

## 2021-06-22 LAB
CHOLEST SERPL-MCNC: 150 MG/DL (ref 50–200)
EST. AVERAGE GLUCOSE BLD GHB EST-MCNC: 105 MG/DL
HBA1C MFR BLD: 5.3 %
HDLC SERPL-MCNC: 68 MG/DL
LDLC SERPL CALC-MCNC: 67 MG/DL (ref 0–100)
NONHDLC SERPL-MCNC: 82 MG/DL
T4 FREE SERPL-MCNC: 1.07 NG/DL (ref 0.76–1.46)
TRIGL SERPL-MCNC: 73 MG/DL
TSH SERPL DL<=0.05 MIU/L-ACNC: 2.08 UIU/ML (ref 0.36–3.74)

## 2021-06-22 PROCEDURE — 80061 LIPID PANEL: CPT

## 2021-06-22 PROCEDURE — 36415 COLL VENOUS BLD VENIPUNCTURE: CPT

## 2021-06-22 PROCEDURE — 84443 ASSAY THYROID STIM HORMONE: CPT

## 2021-06-22 PROCEDURE — 83036 HEMOGLOBIN GLYCOSYLATED A1C: CPT

## 2021-06-22 PROCEDURE — 76536 US EXAM OF HEAD AND NECK: CPT

## 2021-06-22 PROCEDURE — 84439 ASSAY OF FREE THYROXINE: CPT

## 2021-08-27 DIAGNOSIS — E06.3 HYPOTHYROIDISM DUE TO HASHIMOTO'S THYROIDITIS: ICD-10-CM

## 2021-08-27 DIAGNOSIS — E03.8 HYPOTHYROIDISM DUE TO HASHIMOTO'S THYROIDITIS: ICD-10-CM

## 2021-08-30 RX ORDER — LEVOTHYROXINE SODIUM 88 UG/1
88 TABLET ORAL DAILY
Qty: 90 TABLET | Refills: 0 | Status: SHIPPED | OUTPATIENT
Start: 2021-08-30 | End: 2021-10-14 | Stop reason: SDUPTHER

## 2021-09-15 ENCOUNTER — TELEPHONE (OUTPATIENT)
Dept: OTHER | Facility: OTHER | Age: 36
End: 2021-09-15

## 2021-10-04 DIAGNOSIS — E55.9 VITAMIN D DEFICIENCY: ICD-10-CM

## 2021-10-04 DIAGNOSIS — E03.8 HYPOTHYROIDISM DUE TO HASHIMOTO'S THYROIDITIS: Primary | ICD-10-CM

## 2021-10-04 DIAGNOSIS — E06.3 HYPOTHYROIDISM DUE TO HASHIMOTO'S THYROIDITIS: Primary | ICD-10-CM

## 2021-10-04 DIAGNOSIS — E61.1 IRON DEFICIENCY: ICD-10-CM

## 2021-10-12 ENCOUNTER — APPOINTMENT (OUTPATIENT)
Dept: LAB | Age: 36
End: 2021-10-12
Payer: COMMERCIAL

## 2021-10-12 DIAGNOSIS — E61.1 IRON DEFICIENCY: ICD-10-CM

## 2021-10-12 DIAGNOSIS — E06.3 HYPOTHYROIDISM DUE TO HASHIMOTO'S THYROIDITIS: ICD-10-CM

## 2021-10-12 DIAGNOSIS — E55.9 VITAMIN D DEFICIENCY: ICD-10-CM

## 2021-10-12 DIAGNOSIS — E03.8 HYPOTHYROIDISM DUE TO HASHIMOTO'S THYROIDITIS: ICD-10-CM

## 2021-10-12 LAB
25(OH)D3 SERPL-MCNC: 25.7 NG/ML (ref 30–100)
BASOPHILS # BLD AUTO: 0.07 THOUSANDS/ΜL (ref 0–0.1)
BASOPHILS NFR BLD AUTO: 1 % (ref 0–1)
EOSINOPHIL # BLD AUTO: 0.12 THOUSAND/ΜL (ref 0–0.61)
EOSINOPHIL NFR BLD AUTO: 2 % (ref 0–6)
ERYTHROCYTE [DISTWIDTH] IN BLOOD BY AUTOMATED COUNT: 14.5 % (ref 11.6–15.1)
FERRITIN SERPL-MCNC: 22 NG/ML (ref 8–388)
HCT VFR BLD AUTO: 41.7 % (ref 34.8–46.1)
HGB BLD-MCNC: 13.3 G/DL (ref 11.5–15.4)
IMM GRANULOCYTES # BLD AUTO: 0.03 THOUSAND/UL (ref 0–0.2)
IMM GRANULOCYTES NFR BLD AUTO: 0 % (ref 0–2)
LYMPHOCYTES # BLD AUTO: 1.54 THOUSANDS/ΜL (ref 0.6–4.47)
LYMPHOCYTES NFR BLD AUTO: 21 % (ref 14–44)
MCH RBC QN AUTO: 27.4 PG (ref 26.8–34.3)
MCHC RBC AUTO-ENTMCNC: 31.9 G/DL (ref 31.4–37.4)
MCV RBC AUTO: 86 FL (ref 82–98)
MONOCYTES # BLD AUTO: 0.56 THOUSAND/ΜL (ref 0.17–1.22)
MONOCYTES NFR BLD AUTO: 7 % (ref 4–12)
NEUTROPHILS # BLD AUTO: 5.21 THOUSANDS/ΜL (ref 1.85–7.62)
NEUTS SEG NFR BLD AUTO: 69 % (ref 43–75)
NRBC BLD AUTO-RTO: 0 /100 WBCS
PLATELET # BLD AUTO: 351 THOUSANDS/UL (ref 149–390)
PMV BLD AUTO: 11.2 FL (ref 8.9–12.7)
RBC # BLD AUTO: 4.86 MILLION/UL (ref 3.81–5.12)
T4 FREE SERPL-MCNC: 1.29 NG/DL (ref 0.76–1.46)
TSH SERPL DL<=0.05 MIU/L-ACNC: 1.84 UIU/ML (ref 0.36–3.74)
WBC # BLD AUTO: 7.53 THOUSAND/UL (ref 4.31–10.16)

## 2021-10-12 PROCEDURE — 82306 VITAMIN D 25 HYDROXY: CPT

## 2021-10-12 PROCEDURE — 36415 COLL VENOUS BLD VENIPUNCTURE: CPT

## 2021-10-12 PROCEDURE — 84443 ASSAY THYROID STIM HORMONE: CPT

## 2021-10-12 PROCEDURE — 84439 ASSAY OF FREE THYROXINE: CPT

## 2021-10-12 PROCEDURE — 85025 COMPLETE CBC W/AUTO DIFF WBC: CPT

## 2021-10-12 PROCEDURE — 82728 ASSAY OF FERRITIN: CPT

## 2021-10-14 ENCOUNTER — OFFICE VISIT (OUTPATIENT)
Dept: ENDOCRINOLOGY | Facility: CLINIC | Age: 36
End: 2021-10-14
Payer: COMMERCIAL

## 2021-10-14 VITALS
WEIGHT: 257.38 LBS | SYSTOLIC BLOOD PRESSURE: 100 MMHG | BODY MASS INDEX: 41.37 KG/M2 | HEIGHT: 66 IN | HEART RATE: 70 BPM | DIASTOLIC BLOOD PRESSURE: 88 MMHG

## 2021-10-14 DIAGNOSIS — E04.2 MULTIPLE THYROID NODULES: Primary | ICD-10-CM

## 2021-10-14 DIAGNOSIS — E55.9 VITAMIN D DEFICIENCY: ICD-10-CM

## 2021-10-14 DIAGNOSIS — E06.3 HYPOTHYROIDISM DUE TO HASHIMOTO'S THYROIDITIS: ICD-10-CM

## 2021-10-14 DIAGNOSIS — E61.1 IRON DEFICIENCY: ICD-10-CM

## 2021-10-14 DIAGNOSIS — E66.01 MORBID OBESITY (HCC): ICD-10-CM

## 2021-10-14 DIAGNOSIS — E03.8 HYPOTHYROIDISM DUE TO HASHIMOTO'S THYROIDITIS: ICD-10-CM

## 2021-10-14 PROCEDURE — 99214 OFFICE O/P EST MOD 30 MIN: CPT | Performed by: PHYSICIAN ASSISTANT

## 2021-10-14 RX ORDER — LEVOTHYROXINE SODIUM 88 UG/1
88 TABLET ORAL DAILY
Qty: 90 TABLET | Refills: 3 | Status: SHIPPED | OUTPATIENT
Start: 2021-10-14 | End: 2022-06-20

## 2021-10-19 ENCOUNTER — OFFICE VISIT (OUTPATIENT)
Dept: FAMILY MEDICINE CLINIC | Facility: CLINIC | Age: 36
End: 2021-10-19
Payer: COMMERCIAL

## 2021-10-19 VITALS
DIASTOLIC BLOOD PRESSURE: 68 MMHG | TEMPERATURE: 97.5 F | OXYGEN SATURATION: 97 % | BODY MASS INDEX: 40.98 KG/M2 | HEART RATE: 90 BPM | WEIGHT: 255 LBS | HEIGHT: 66 IN | SYSTOLIC BLOOD PRESSURE: 118 MMHG

## 2021-10-19 DIAGNOSIS — E55.9 VITAMIN D DEFICIENCY: ICD-10-CM

## 2021-10-19 DIAGNOSIS — F41.1 GENERALIZED ANXIETY DISORDER: Primary | ICD-10-CM

## 2021-10-19 DIAGNOSIS — E03.8 HYPOTHYROIDISM DUE TO HASHIMOTO'S THYROIDITIS: ICD-10-CM

## 2021-10-19 DIAGNOSIS — E66.01 MORBID OBESITY (HCC): ICD-10-CM

## 2021-10-19 DIAGNOSIS — E06.3 HYPOTHYROIDISM DUE TO HASHIMOTO'S THYROIDITIS: ICD-10-CM

## 2021-10-19 PROCEDURE — 99214 OFFICE O/P EST MOD 30 MIN: CPT | Performed by: NURSE PRACTITIONER

## 2021-10-19 RX ORDER — ESCITALOPRAM OXALATE 5 MG/1
5 TABLET ORAL DAILY
Qty: 30 TABLET | Refills: 1 | Status: SHIPPED | OUTPATIENT
Start: 2021-10-19 | End: 2021-11-19 | Stop reason: SDUPTHER

## 2021-10-28 ENCOUNTER — TELEPHONE (OUTPATIENT)
Dept: FAMILY MEDICINE CLINIC | Facility: CLINIC | Age: 36
End: 2021-10-28

## 2021-11-19 ENCOUNTER — OFFICE VISIT (OUTPATIENT)
Dept: FAMILY MEDICINE CLINIC | Facility: CLINIC | Age: 36
End: 2021-11-19
Payer: COMMERCIAL

## 2021-11-19 VITALS
HEART RATE: 61 BPM | DIASTOLIC BLOOD PRESSURE: 78 MMHG | HEIGHT: 65 IN | SYSTOLIC BLOOD PRESSURE: 126 MMHG | BODY MASS INDEX: 42.82 KG/M2 | WEIGHT: 257 LBS | OXYGEN SATURATION: 97 % | TEMPERATURE: 97.2 F

## 2021-11-19 DIAGNOSIS — F41.1 GENERALIZED ANXIETY DISORDER: Primary | ICD-10-CM

## 2021-11-19 DIAGNOSIS — Z23 ENCOUNTER FOR IMMUNIZATION: ICD-10-CM

## 2021-11-19 PROCEDURE — 90686 IIV4 VACC NO PRSV 0.5 ML IM: CPT | Performed by: FAMILY MEDICINE

## 2021-11-19 PROCEDURE — 99213 OFFICE O/P EST LOW 20 MIN: CPT | Performed by: NURSE PRACTITIONER

## 2021-11-19 PROCEDURE — 90471 IMMUNIZATION ADMIN: CPT | Performed by: FAMILY MEDICINE

## 2021-11-19 RX ORDER — ESCITALOPRAM OXALATE 10 MG/1
10 TABLET ORAL DAILY
Qty: 90 TABLET | Refills: 0 | Status: SHIPPED | OUTPATIENT
Start: 2021-11-19 | End: 2022-01-10 | Stop reason: SDUPTHER

## 2021-11-22 ENCOUNTER — APPOINTMENT (OUTPATIENT)
Dept: LAB | Age: 36
End: 2021-11-22
Payer: COMMERCIAL

## 2021-11-22 ENCOUNTER — TELEPHONE (OUTPATIENT)
Dept: FAMILY MEDICINE CLINIC | Facility: CLINIC | Age: 36
End: 2021-11-22

## 2021-11-22 DIAGNOSIS — R39.9 UTI SYMPTOMS: Primary | ICD-10-CM

## 2021-11-22 DIAGNOSIS — R39.9 UTI SYMPTOMS: ICD-10-CM

## 2021-11-22 LAB
BACTERIA UR QL AUTO: ABNORMAL /HPF
BILIRUB UR QL STRIP: NEGATIVE
CLARITY UR: CLEAR
COLOR UR: YELLOW
GLUCOSE UR STRIP-MCNC: NEGATIVE MG/DL
HGB UR QL STRIP.AUTO: NEGATIVE
KETONES UR STRIP-MCNC: NEGATIVE MG/DL
LEUKOCYTE ESTERASE UR QL STRIP: ABNORMAL
NITRITE UR QL STRIP: NEGATIVE
NON-SQ EPI CELLS URNS QL MICRO: ABNORMAL /HPF
PH UR STRIP.AUTO: 8 [PH]
PROT UR STRIP-MCNC: NEGATIVE MG/DL
RBC #/AREA URNS AUTO: ABNORMAL /HPF
SP GR UR STRIP.AUTO: 1.02 (ref 1–1.03)
UROBILINOGEN UR QL STRIP.AUTO: 1 E.U./DL
WBC #/AREA URNS AUTO: ABNORMAL /HPF

## 2021-11-22 PROCEDURE — 87086 URINE CULTURE/COLONY COUNT: CPT

## 2021-11-22 PROCEDURE — 87077 CULTURE AEROBIC IDENTIFY: CPT

## 2021-11-22 PROCEDURE — 81001 URINALYSIS AUTO W/SCOPE: CPT

## 2021-11-22 PROCEDURE — 87186 SC STD MICRODIL/AGAR DIL: CPT

## 2021-11-24 ENCOUNTER — TELEPHONE (OUTPATIENT)
Dept: FAMILY MEDICINE CLINIC | Facility: CLINIC | Age: 36
End: 2021-11-24

## 2021-11-24 DIAGNOSIS — R30.0 DYSURIA: Primary | ICD-10-CM

## 2021-11-24 DIAGNOSIS — R39.9 UTI SYMPTOMS: Primary | ICD-10-CM

## 2021-11-24 LAB — BACTERIA UR CULT: ABNORMAL

## 2021-11-24 RX ORDER — NITROFURANTOIN 25; 75 MG/1; MG/1
100 CAPSULE ORAL 2 TIMES DAILY
Qty: 10 CAPSULE | Refills: 0 | Status: SHIPPED | OUTPATIENT
Start: 2021-11-24 | End: 2021-11-29

## 2022-01-10 ENCOUNTER — OFFICE VISIT (OUTPATIENT)
Dept: FAMILY MEDICINE CLINIC | Facility: CLINIC | Age: 37
End: 2022-01-10
Payer: COMMERCIAL

## 2022-01-10 DIAGNOSIS — F41.1 GENERALIZED ANXIETY DISORDER: Primary | ICD-10-CM

## 2022-01-10 PROCEDURE — 99213 OFFICE O/P EST LOW 20 MIN: CPT | Performed by: NURSE PRACTITIONER

## 2022-01-10 RX ORDER — ESCITALOPRAM OXALATE 10 MG/1
10 TABLET ORAL DAILY
Qty: 90 TABLET | Refills: 2 | Status: SHIPPED | OUTPATIENT
Start: 2022-01-10 | End: 2022-06-20

## 2022-01-10 NOTE — ASSESSMENT & PLAN NOTE
Large improvement on 10 mg Lexapro  Would like to stay with current dose  Recommend f/u in 6 months  Refills provided

## 2022-01-10 NOTE — PROGRESS NOTES
Virtual Regular Visit    Verification of patient location:    Patient is located in the following state in which I hold an active license [place]      Assessment/Plan:    Problem List Items Addressed This Visit        Other    Generalized anxiety disorder - Primary     Large improvement on 10 mg Lexapro  Would like to stay with current dose  Recommend f/u in 6 months  Refills provided  Relevant Medications    escitalopram (LEXAPRO) 10 mg tablet               Reason for visit is   Chief Complaint   Patient presents with    Virtual Regular Visit        Encounter provider Carlos Yu, [facility]    Provider located at [address] [place] [address] [address] [facility] [id number] [phone]      Recent Visits  No visits were found meeting these conditions  Showing recent visits within past 7 days and meeting all other requirements  Today's Visits  Date Type Provider Dept   01/10/22 Office Visit Carlos Yu, [facility]   Showing today's visits and meeting all other requirements  Future Appointments  No visits were found meeting these conditions  Showing future appointments within next 150 days and meeting all other requirements       The patient was identified by name and date of birth  Bentley Rubin was informed that this is a telemedicine visit and that the visit is being conducted through [address] [place] [address] Now and patient was informed that this is a secure, HIPAA-compliant platform  She agrees to proceed     My office door was closed  No one else was in the room  She acknowledged consent and understanding of privacy and security of the video platform  The patient has agreed to participate and understands they can discontinue the visit at any time  Patient is aware this is a billable service  Subjective  Bentley Rubin is a 31 y o  female   Follow up for anxiety   1 month ago increased from 5 mg to 10 mg lexapro  She has more focus/ concentration on the 10 mg  She doesn't get as worried as she did prior  She doesn't have panic attacks or anxiety attacks  She is sleeping okay  No medication side effects  Past Medical History:   Diagnosis Date    Disease of thyroid gland     Obesity        Past Surgical History:   Procedure Laterality Date    US GUIDED THYROID BIOPSY  11/13/2018       Current Outpatient Medications   Medication Sig Dispense Refill    ergocalciferol (ERGOCALCIFEROL) 1 25 MG (39469 UT) capsule Take 1 capsule (50,000 Units total) by mouth once a week (Patient not taking: Reported on 10/15/2020) 12 capsule 0    escitalopram (LEXAPRO) 10 mg tablet Take 1 tablet (10 mg total) by mouth daily 90 tablet 2    ferrous sulfate 324 (65 Fe) mg 1 tab daily at dinner (Patient not taking: Reported on 10/14/2021)      levothyroxine 88 mcg tablet Take 1 tablet (88 mcg total) by mouth daily 90 tablet 3     No current facility-administered medications for this visit  No Known Allergies    Review of Systems   Constitutional: Negative for chills and fever  Eyes: Negative for discharge  Respiratory: Negative for shortness of breath  Cardiovascular: Negative for chest pain  Gastrointestinal: Negative for constipation and diarrhea  Genitourinary: Negative for difficulty urinating  Musculoskeletal: Negative for joint swelling  Skin: Negative for rash  Neurological: Negative for headaches  Hematological: Negative for adenopathy  Psychiatric/Behavioral: The patient is not nervous/anxious  Video Exam    There were no vitals filed for this visit  Physical Exam  Constitutional:       General: She is not in acute distress  Appearance: Normal appearance  She is not ill-appearing, toxic-appearing or diaphoretic  HENT:      Head: Normocephalic and atraumatic  Nose: Nose normal    Pulmonary:      Effort: Pulmonary effort is normal  No respiratory distress     Skin: Coloration: Skin is not pale  Neurological:      Mental Status: She is alert and oriented to person, place, and time  Psychiatric:         Mood and Affect: Mood normal           I spent 20 minutes with patient today in which greater than 50% of the time was spent in counseling/coordination of care regarding anxiety    VIRTUAL VISIT DISCLAIMER      Good [place] verbally agrees to participate in [employer]  Pt is aware that [employer] could be limited without vital signs or the ability to perform a full hands-on physical exam  Mary Whitfield understands she or the provider may request at any time to terminate the video visit and request the patient to seek care or treatment in person

## 2022-03-01 NOTE — PATIENT INSTRUCTIONS
Thank you for visiting [facility]  You may be invited to complete a survey about you visit  Your responses will help us improve care we provide  A 10 means the care you received at your visit met your expectations  If you are unable to give a 10 please list reasons so we can work on improving your patient experience  COVID-19: Slow the Coronavirus Spread   WHAT YOU NEED TO KNOW:   Why is it important to slow the spread of the 2019 coronavirus? The virus that causes COVID-19 is highly contagious (easily spread)  The virus has also changed into new forms, called variants  Some variants are spreading even more quickly and easily than the original virus  COVID-19 can cause severe or life-threatening health problems in some people  Health problems can continue for weeks, months, or possibly years  Signs and symptoms of infection from any form of the virus can take up to 14 days to begin, or may not develop at all  This means you or someone around you may have the virus and pass it to others without knowing it  How does the 2019 coronavirus spread? The virus can be passed starting 2 to 3 days before symptoms begin or before a positive test if symptoms never begin  · Droplets are the main way all coronaviruses spread  The virus travels in droplets that form when a person talks, coughs, or sneezes  The droplets can also float in the air for minutes or hours  Infection happens when you breathe in the droplets or get them in your eyes or nose  Close personal contact with an infected person increases your risk for infection  This means being within 6 feet (2 meters) of the person for at least 15 minutes over 24 hours  · Person-to-person contact can spread the virus  For example, a person with the virus on his or her hands can spread it by shaking hands with someone  · The virus can stay on objects and surfaces for up to 3 days    You may become infected by touching the object or surface and then touching your eyes or mouth  What do I need to know about COVID-19 vaccines? Healthcare providers recommend a COVID-19 vaccine, even if you have already had COVID-19  You are considered fully vaccinated against COVID-19 two weeks after the final dose of any COVID-19 vaccine  Let your healthcare provider know when you have received the final dose of the vaccine  Make a copy of your vaccination card  Keep the original with you in case you need to show it  Keep the copy in a safe place  · COVID-19 vaccines are given as a shot in 1 or 2 doses  Vaccination is recommended for everyone 5 years or older  One 2-dose vaccine is fully approved for those 12 or older  This vaccine also has an emergency use authorization (EUA) for children 11to 13years old  No vaccine is currently available for children younger than 5 years  A booster (additional) dose is given to help the immune system continue to protect against severe COVID-19     ? A booster is recommended for all adults 18 or older  The booster can be a different brand of the COVID-19 vaccine than you originally received  The timing for the booster depends on the type of vaccine you received:    § 1-dose vaccine: The booster is given at least 2 months after you received the vaccine  § 2-dose vaccine: The booster is given at least 6 months after the second dose   ? A booster can be given to adolescents 12to 16years old  Only 1 COVID-19 vaccine has an EUA for adolescent boosters  The booster is given at least 6 months after the second dose of the original vaccine series  Even after you get the vaccine, continue social distancing and other measures  Although rare, you can become infected after you get the vaccine  You may also be able to pass the virus to others without knowing you are infected  After you get the vaccine, check local, national, and international travel rules  You may need to be tested before you travel   Some countries require proof of a negative test before you travel  You may also need to quarantine after you return  How can I help prevent the virus from spreading? · Wash your hands often throughout the day  Use soap and water  Rub your soapy hands together, lacing your fingers, for at least 20 seconds  Rinse with warm, running water  Dry your hands with a clean towel or paper towel  Use hand  that contains alcohol if soap and water are not available  Teach children how to wash their hands and use hand   · Cover sneezes and coughs  Turn your face away and cover your mouth and nose with a tissue  Throw the tissue away  Use the bend of your arm if a tissue is not available  Then wash your hands well with soap and water or use hand   Teach children how to cover a cough or sneeze  · Follow national and local social distancing rules  Rules may change over time as restrictions are lifted  · Stay home if you are sick or think you may have COVID-19  It is important to stay home if you are waiting for a testing appointment or for test results  · Avoid close physical contact with anyone who does not live in your home  Do not shake hands with, hug, or kiss a person as a greeting  If you must use public transportation (such as a bus or subway), try to sit or stand away from others  · Wear a face covering (mask) when needed  Use a cloth covering with at least 2 layers  You can also create layers by putting a cloth covering over a disposable non-medical mask  Cover your mouth and your nose  · Avoid in-person gatherings and crowds  Attend virtually if possible  What can I do to prevent an infection in my home? · Clean and disinfect high-touch surfaces and objects in your home often  Use disinfecting wipes or a disinfecting solution of 4 teaspoons of bleach in 1 quart (4 cups) of water  Wash clothing and bedding with regular laundry detergent   Wash and dry on the warmest settings for the fabric  · Do not share items with anyone  This includes food, drinks, dishes, and eating utensils  · Safely handle food you have delivered or  from a restaurant  If possible, have food left at your door or placed into your car  This helps prevent close physical contact with anyone  Wash your hands before you eat  · Keep anyone who is infected away from others in the home, if possible  The person should have his or her own dishes and eating utensils  How can I stay safe when I go out? · Plan your route  This will help you make the fewest stops possible and help prevent close contact  · Make a list of items to buy before you go out  This will limit the items you touch in the store  The more items you handle, the more risk you take of getting the virus on your hands  · Remember to wash your hands  Wash before you leave your home, so you do not bring the virus with you  Wash again when you get home, after you put away any items you bought  · Wear a face covering (mask) when needed  Bring extra coverings with you  You may need to wear a covering in restaurants, stores, and other public buildings  You may also need a covering on mass transit, such as a bus, subway, or airplane  Where can I find more information? · Centers for Disease Control and Prevention  [address] , [address]  Phone: 5- 711 - 2197807  Phone: 4- 335 - [phone]  Web Address: [email or web address]    When should I call my doctor? · You have questions about social distancing or ways to keep your home and family safe  · You have questions or concerns about the new coronavirus or COVID-19  CARE AGREEMENT:   You have the right to help plan your care  Learn about your health condition and how it may be treated  Discuss treatment options with your healthcare providers to decide what care you want to receive  You always have the right to refuse treatment   The above information is an  only  It is not intended as medical advice for individual conditions or treatments  Talk to your doctor, nurse or pharmacist before following any medical regimen to see if it is safe and effective for you  © Copyright [employer] 2022 Information is for End User's use only and may not be sold, redistributed or otherwise used for commercial purposes  All illustrations and images included in CareNotes® are the copyrighted property of [employer]  or [address] [place] [address] Papillomavirus Vaccine (By injection)   Human Papillomavirus Vaccine (HUE-man pap-ah-DICK-javier-VYE-nir VAX-een)  Helps prevent genital warts and cancer of the anus, cervix, vagina, vulva, oropharyngeal (mouth and throat), or head and neck, which may be caused by human papillomavirus (HPV)  Brand Name(s): Gardasil 9   There may be other brand names for this medicine  When This Medicine Should Not Be Used: This vaccine is not right for everyone  You should not receive it if you had an allergic reaction to human papillomavirus vaccine or to yeast   How to Use This Medicine:   Injectable  · Your doctor will prescribe your exact dose and tell you how often it should be given  This medicine is given as a shot into one of your muscles  It is usually given in the upper arm or upper leg  · A nurse or other health provider will give you this medicine  · This vaccine must be given as 2 or 3 doses based on the brand and your age  · Read and follow the patient instructions that come with this medicine  Talk to your doctor or pharmacist if you have any questions  · Missed dose: This vaccine needs to be given on a fixed schedule  If you miss your scheduled shot, call your doctor to make another appointment as soon as possible  Drugs and Foods to Avoid:   Ask your doctor or pharmacist before using any other medicine, including over-the-counter medicines, vitamins, and herbal products    · Some medicines can affect how this vaccine works  Tell your doctor if you are using any treatment that weakens the immune system, including cancer medicine, radiation treatment, or a steroid  Warnings While Using This Medicine:   · Tell your doctor if you are pregnant or breastfeeding, or if you have a weak immune system or are allergic to latex  · This vaccine will not protect you against sexually transmitted diseases that are not caused by HPV  · You still need to see your doctor for routine screening tests for anal or cervical cancer (pap test) even after you receive this vaccine  · You may feel faint, lightheaded, or dizzy right after you receive this vaccine  Your doctor may ask you to wait 15 minutes before standing  Possible Side Effects While Using This Medicine:   Call your doctor right away if you notice any of these side effects:  · Allergic reaction: Itching or hives, swelling in your face or hands, swelling or tingling in your mouth or throat, chest tightness, trouble breathing  · Lightheadedness, dizziness, or fainting  If you notice these less serious side effects, talk with your doctor:   · Headache, tiredness  · Mild fever  · Pain, redness, itching, or swelling where the shot is given  If you notice other side effects that you think are caused by this medicine, tell your doctor  Call your doctor for medical advice about side effects  You may report side effects to FDA at 0-412-FDA-3721    © Copyright [facility] 2022 Information is for End User's use only and may not be sold, redistributed or otherwise used for commercial purposes  The above information is an  only  It is not intended as medical advice for individual conditions or treatments  Talk to your doctor, nurse or pharmacist before following any medical regimen to see if it is safe and effective for you  Breast Self Exam for Women   WHAT YOU NEED TO KNOW:   What is a breast self-exam (BSE)?   A BSE is a way to check your breasts for lumps and other changes  Regular BSEs can help you know how your breasts normally look and feel  Most breast lumps or changes are not cancer, but you should always have them checked by a healthcare provider  Your healthcare provider can also watch you do a BSE and can tell you if you are doing your BSE correctly  Why should I do a BSE? Breast cancer is the most common type of cancer in women  Even if you have mammograms, you may still want to do a BSE regularly  If you know how your breasts normally feel and look, it may help you know when to contact your healthcare provider  Mammograms can miss some cancers  You may find a lump during a BSE that did not show up on a mammogram   When should I do a BSE? If you have periods, you may want to do your BSE 1 week after your period ends  This is the time when your breasts may be the least swollen, lumpy, or tender  You can do regular BSEs even if you are breastfeeding or have breast implants  How should I do a BSE? · Look at your breasts in a mirror  Look at the size and shape of each breast and nipple  Check for swelling, lumps, dimpling, scaly skin, or other skin changes  Look for nipple changes, such as a nipple that is painful or beginning to pull inward  Gently squeeze both nipples and check to see if fluid (that is not breast milk) comes out of them  If you find any of these or other breast changes, contact your healthcare provider  Check your breasts while you sit or  the following 3 positions:    ? Hang your arms down at your sides  ? Raise your hands and join them behind your head  ? Put firm pressure with your hands on your hips  Bend slightly forward while you look at your breasts in the mirror  · Lie down and feel your breasts  When you lie down, your breast tissue spreads out evenly over your chest  This makes it easier for you to feel for lumps and anything that may not be normal for your breasts   Do a BSE on one breast at a time     ? Place a small pillow or towel under your left shoulder  Put your left arm behind your head  ? Use the 3 middle fingers of your right hand  Use your fingertip pads, on the top of your fingers  Your fingertip pad is the most sensitive part of your finger  ? Use small circles to feel your breast tissue  Use your fingertip pads to make dime-sized, overlapping circles on your breast and armpits  Use light, medium, and firm pressure  First, press lightly  Second, press with medium pressure to feel a little deeper into the breast  Last, use firm pressure to feel deep within your breast     ? Examine your entire breast area  Examine the breast area from above the breast to below the breast where you feel only ribs  Make small circles with your fingertips, starting in the middle of your armpit  Make circles going up and down the breast area  Continue toward your breast and all the way across it  Examine the area from your armpit all the way over to the middle of your chest (breastbone)  Stop at the middle of your chest     ? Move the pillow or towel to your right shoulder, and put your right arm behind your head  Use the 3 fingertip pads of your left hand, and repeat the above steps to do a BSE on your right breast     What else can I do to check for breast problems or cancer? Talk to your healthcare provider about mammograms  A mammogram is an x-ray of your breasts to screen for breast cancer or other problems  Your provider can tell you the benefits and risks of mammograms  The first mammogram is usually at age 39 or 48  Your provider may recommend you start at 36 or younger if your risk for breast cancer is high  Mammograms usually continue every 1 to 2 years until age 76  When should I call my doctor? · You find any lumps or changes in your breasts  · You have breast pain or fluid coming from your nipples      · You have questions or concerns or concerns about your condition or care     CARE AGREEMENT:   You have the right to help plan your care  Learn about your health condition and how it may be treated  Discuss treatment options with your healthcare providers to decide what care you want to receive  You always have the right to refuse treatment  The above information is an  only  It is not intended as medical advice for individual conditions or treatments  Talk to your doctor, nurse or pharmacist before following any medical regimen to see if it is safe and effective for you  © Copyright [employer] 2022 Information is for End User's use only and may not be sold, redistributed or otherwise used for commercial purposes   All illustrations and images included in CareNotes® are the copyrighted property of [employer]  or [address] [place] [address]

## 2022-03-01 NOTE — PROGRESS NOTES
Subjective      Claryce Jeans is a 31 y o  female who presents for annual well woman exam   Last Pap smear  20 resulted NILM/ HR HPV(-)  Periods are regular every 28-30 days, lasting 4 days  No intermenstrual bleeding, spotting, or discharge  Current contraception: none  History of abnormal Pap smear: no  Family history of uterine or ovarian cancer: no  Regular self breast exam: no  History of abnormal mammogram: to begin at age 38 unless otherwise clinically indicated  Family history of breast cancer: no  History of abnormal lipids: no  Gardasil vaccine: unsure       Menstrual History:  OB History        3    Para   3    Term   2            AB        Living   3       SAB        IAB        Ectopic        Multiple        Live Births   1                Menarche age: 8  Patient's last menstrual period was 2022  Period Cycle (Days):  (monthly)  Period Duration (Days): 4  Period Pattern: Regular  Menstrual Flow: Moderate  Dysmenorrhea: (!) Mild  Dysmenorrhea Symptoms: Cramping    The following portions of the patient's history were reviewed and updated as appropriate: allergies, current medications, past family history, past medical history, past social history, past surgical history and problem list     Review of Systems  Pertinent items are noted in HPI        Objective      /66   Ht 5' 5" (1 651 m)   Wt 120 kg (264 lb)   LMP 2022   BMI 43 93 kg/m²     General:   alert and oriented, in no acute distress, alert, morbidly obese, appears stated age and cooperative   Heart: regular rate and rhythm, S1, S2 normal, no murmur, click, rub or gallop   Lungs: clear to auscultation bilaterally   Abdomen: soft, non-tender, without masses or organomegaly   Vulva: normal, Bartholin's, Urethra, [place]'s normal   Vagina: normal mucosa, normal discharge, no palpable nodules   Cervix: no cervical motion tenderness and no lesions   Uterus: non-tender, difficult to assess d/t body habitus    Adnexa: non-tender, difficult to assess d/t body habitus   Breast: breasts appear normal, no suspicious masses, no skin or nipple changes or axillary nodes  Assessment      @well woman@   Plan      All questions answered  Breast self exam technique reviewed and patient encouraged to perform self-exam monthly  Contraception: condoms  Diagnosis explained in detail, including differential   Dietary diary  Discussed healthy lifestyle modifications  Educational material distributed  Follow up in 1 year for annual exam    Follow up as needed  breast awareness reviewed     Encouraged healthy diet, exercise and lifestyle  Encouraged follow-up with PCP as needed  Had seasonal influenza vaccination  Gardasil vaccine series reviewed  Written information provided  Encouraged social distancing, good hand hygiene, avoidance of crowds and masking  Written information provided about COVID-19  Had vaccine x 2    VBI-  BMI Counseling: Body mass index is 43 93 kg/m²  The BMI is above normal  Nutrition recommendations include reducing portion sizes, decreasing overall calorie intake and 3-5 servings of fruits/vegetables daily  Exercise recommendations include exercising 3-5 times per week, joining a gym and strength training exercises  continue weight loss   Lost 30+lb in the past year

## 2022-03-02 ENCOUNTER — ANNUAL EXAM (OUTPATIENT)
Dept: OBGYN CLINIC | Facility: MEDICAL CENTER | Age: 37
End: 2022-03-02
Payer: COMMERCIAL

## 2022-03-02 VITALS
DIASTOLIC BLOOD PRESSURE: 66 MMHG | SYSTOLIC BLOOD PRESSURE: 108 MMHG | HEIGHT: 65 IN | BODY MASS INDEX: 43.99 KG/M2 | WEIGHT: 264 LBS

## 2022-03-02 DIAGNOSIS — Z01.419 WELL WOMAN EXAM WITH ROUTINE GYNECOLOGICAL EXAM: Primary | ICD-10-CM

## 2022-03-02 PROCEDURE — 99395 PREV VISIT EST AGE 18-39: CPT | Performed by: NURSE PRACTITIONER

## 2022-04-21 DIAGNOSIS — R30.0 DYSURIA: Primary | ICD-10-CM

## 2022-04-25 ENCOUNTER — APPOINTMENT (OUTPATIENT)
Dept: LAB | Age: 37
End: 2022-04-25
Payer: COMMERCIAL

## 2022-04-25 DIAGNOSIS — R30.0 DYSURIA: ICD-10-CM

## 2022-04-25 LAB
BACTERIA UR QL AUTO: ABNORMAL /HPF
BILIRUB UR QL STRIP: NEGATIVE
CLARITY UR: CLEAR
COLOR UR: ABNORMAL
GLUCOSE UR STRIP-MCNC: NEGATIVE MG/DL
HGB UR QL STRIP.AUTO: NEGATIVE
KETONES UR STRIP-MCNC: NEGATIVE MG/DL
LEUKOCYTE ESTERASE UR QL STRIP: ABNORMAL
NITRITE UR QL STRIP: NEGATIVE
NON-SQ EPI CELLS URNS QL MICRO: ABNORMAL /HPF
PH UR STRIP.AUTO: 6.5 [PH]
PROT UR STRIP-MCNC: NEGATIVE MG/DL
RBC #/AREA URNS AUTO: ABNORMAL /HPF
SP GR UR STRIP.AUTO: 1.02 (ref 1–1.03)
UROBILINOGEN UR STRIP-ACNC: 2 MG/DL
WBC #/AREA URNS AUTO: ABNORMAL /HPF

## 2022-04-25 PROCEDURE — 81001 URINALYSIS AUTO W/SCOPE: CPT

## 2022-04-25 PROCEDURE — 87086 URINE CULTURE/COLONY COUNT: CPT

## 2022-04-27 LAB — BACTERIA UR CULT: NORMAL

## 2022-04-28 ENCOUNTER — TELEPHONE (OUTPATIENT)
Dept: FAMILY MEDICINE CLINIC | Facility: CLINIC | Age: 37
End: 2022-04-28

## 2022-04-28 DIAGNOSIS — R30.0 DYSURIA: Primary | ICD-10-CM

## 2022-04-28 NOTE — TELEPHONE ENCOUNTER
----- Message from Ana Riddle sent at 4/28/2022 10:40 AM EDT -----  Patient aware and states she still has symptoms please place order patient will go to lab and get this done

## 2022-05-03 ENCOUNTER — TELEPHONE (OUTPATIENT)
Dept: FAMILY MEDICINE CLINIC | Facility: CLINIC | Age: 37
End: 2022-05-03

## 2022-05-03 ENCOUNTER — APPOINTMENT (OUTPATIENT)
Dept: LAB | Facility: HOSPITAL | Age: 37
End: 2022-05-03
Payer: COMMERCIAL

## 2022-05-03 DIAGNOSIS — R30.0 DYSURIA: ICD-10-CM

## 2022-05-03 DIAGNOSIS — R39.9 UTI SYMPTOMS: Primary | ICD-10-CM

## 2022-05-03 LAB
BACTERIA UR QL AUTO: ABNORMAL /HPF
BILIRUB UR QL STRIP: NEGATIVE
CLARITY UR: ABNORMAL
COLOR UR: YELLOW
GLUCOSE UR STRIP-MCNC: NEGATIVE MG/DL
HGB UR QL STRIP.AUTO: ABNORMAL
KETONES UR STRIP-MCNC: NEGATIVE MG/DL
LEUKOCYTE ESTERASE UR QL STRIP: ABNORMAL
NITRITE UR QL STRIP: NEGATIVE
NON-SQ EPI CELLS URNS QL MICRO: ABNORMAL /HPF
PH UR STRIP.AUTO: 6 [PH]
PROT UR STRIP-MCNC: NEGATIVE MG/DL
RBC #/AREA URNS AUTO: ABNORMAL /HPF
SP GR UR STRIP.AUTO: 1.02 (ref 1–1.03)
UROBILINOGEN UR QL STRIP.AUTO: 1 E.U./DL
WBC #/AREA URNS AUTO: ABNORMAL /HPF

## 2022-05-03 PROCEDURE — 87077 CULTURE AEROBIC IDENTIFY: CPT

## 2022-05-03 PROCEDURE — 81001 URINALYSIS AUTO W/SCOPE: CPT | Performed by: NURSE PRACTITIONER

## 2022-05-03 PROCEDURE — 87086 URINE CULTURE/COLONY COUNT: CPT

## 2022-05-03 PROCEDURE — 87186 SC STD MICRODIL/AGAR DIL: CPT

## 2022-05-03 RX ORDER — NITROFURANTOIN 25; 75 MG/1; MG/1
100 CAPSULE ORAL 2 TIMES DAILY
Qty: 10 CAPSULE | Refills: 0 | Status: SHIPPED | OUTPATIENT
Start: 2022-05-03 | End: 2022-05-06

## 2022-05-03 NOTE — TELEPHONE ENCOUNTER
I called and spoke with the Pt and she verbalized understanding  She is already on the road to [place]  She asked if the culture is positive and she need the antibiotic could we send antibiotic to [facility] at  [place] [id number]

## 2022-05-03 NOTE — TELEPHONE ENCOUNTER
Urine culture isn't back yet, I would wait for that before starting an antibiotic  I'm going to send an antibiotic to the pharmacy so she has it on hand since she's traveling but she shouldn't start it unless her urine culture Is positive  We will be in touch with her culture results

## 2022-05-03 NOTE — TELEPHONE ENCOUNTER
Patient called again forward  to [facility] to review  When we contact patient please ask if she would like to continue with [facility] or Mikey Leon  Update PCP field

## 2022-05-03 NOTE — TELEPHONE ENCOUNTER
Patient called today  She had a urine dip and it came back flagged  Please review as patient is leaving for [facility] and needs to know what she should take

## 2022-05-06 DIAGNOSIS — N39.0 URINARY TRACT INFECTION WITHOUT HEMATURIA, SITE UNSPECIFIED: ICD-10-CM

## 2022-05-06 DIAGNOSIS — R39.9 UTI SYMPTOMS: Primary | ICD-10-CM

## 2022-05-06 DIAGNOSIS — N30.00 ACUTE CYSTITIS WITHOUT HEMATURIA: Primary | ICD-10-CM

## 2022-05-06 LAB — BACTERIA UR CULT: ABNORMAL

## 2022-05-06 RX ORDER — SULFAMETHOXAZOLE AND TRIMETHOPRIM 800; 160 MG/1; MG/1
1 TABLET ORAL EVERY 12 HOURS SCHEDULED
Qty: 14 TABLET | Refills: 0 | Status: SHIPPED | OUTPATIENT
Start: 2022-05-06 | End: 2022-05-13

## 2022-05-06 RX ORDER — SULFAMETHOXAZOLE AND TRIMETHOPRIM 800; 160 MG/1; MG/1
1 TABLET ORAL EVERY 12 HOURS SCHEDULED
Qty: 14 TABLET | Refills: 0 | Status: SHIPPED | OUTPATIENT
Start: 2022-05-06 | End: 2022-05-06

## 2022-05-06 RX ORDER — CIPROFLOXACIN 500 MG/1
500 TABLET, FILM COATED ORAL EVERY 12 HOURS SCHEDULED
Qty: 10 TABLET | Refills: 0 | Status: SHIPPED | OUTPATIENT
Start: 2022-05-06 | End: 2022-05-11

## 2022-05-06 NOTE — TELEPHONE ENCOUNTER
Pt called the office in regards to she saw her urine culture results are back in my chart  Pt is asking can they please be review and can an antibiotic please be sent to the [facility] at [place] [id number]  Pt is aware   Neal Marsh is out of the office today will request another physician advise     Pt's number is [phone]

## 2022-06-20 DIAGNOSIS — F41.1 GENERALIZED ANXIETY DISORDER: ICD-10-CM

## 2022-06-20 DIAGNOSIS — E06.3 HYPOTHYROIDISM DUE TO HASHIMOTO'S THYROIDITIS: ICD-10-CM

## 2022-06-20 DIAGNOSIS — E03.8 HYPOTHYROIDISM DUE TO HASHIMOTO'S THYROIDITIS: ICD-10-CM

## 2022-06-20 RX ORDER — ESCITALOPRAM OXALATE 10 MG/1
TABLET ORAL
Qty: 90 TABLET | Refills: 0 | Status: SHIPPED | OUTPATIENT
Start: 2022-06-20

## 2022-06-20 RX ORDER — LEVOTHYROXINE SODIUM 88 UG/1
TABLET ORAL
Qty: 90 TABLET | Refills: 0 | Status: SHIPPED | OUTPATIENT
Start: 2022-06-20

## 2022-08-27 ENCOUNTER — HOSPITAL ENCOUNTER (EMERGENCY)
Facility: HOSPITAL | Age: 37
Discharge: HOME | End: 2022-08-27
Attending: EMERGENCY MEDICINE
Payer: COMMERCIAL

## 2022-08-27 ENCOUNTER — APPOINTMENT (OUTPATIENT)
Dept: RADIOLOGY | Facility: HOSPITAL | Age: 37
End: 2022-08-27
Payer: COMMERCIAL

## 2022-08-27 VITALS
TEMPERATURE: 97.7 F | HEART RATE: 94 BPM | DIASTOLIC BLOOD PRESSURE: 73 MMHG | OXYGEN SATURATION: 100 % | SYSTOLIC BLOOD PRESSURE: 110 MMHG | RESPIRATION RATE: 18 BRPM
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
## 2022-08-27 DIAGNOSIS — M25.551 RIGHT HIP PAIN: ICD-10-CM

## 2022-08-27 PROCEDURE — 73502 X-RAY EXAM HIP UNI 2-3 VIEWS: CPT

## 2022-08-27 PROCEDURE — 96372 THER/PROPH/DIAG INJ SC/IM: CPT

## 2022-08-27 PROCEDURE — 99284 EMERGENCY DEPT VISIT MOD MDM: CPT | Performed by: EMERGENCY MEDICINE

## 2022-08-27 PROCEDURE — 99283 EMERGENCY DEPT VISIT LOW MDM: CPT

## 2022-08-27 RX ORDER — LIDOCAINE 50 MG/G
1 PATCH TOPICAL ONCE
Status: DISCONTINUED | OUTPATIENT
Start: 2022-08-27 | End: 2022-08-27 | Stop reason: HOSPADM

## 2022-08-27 RX ORDER — ACETAMINOPHEN 325 MG/1
975 TABLET ORAL ONCE
Status: COMPLETED | OUTPATIENT
Start: 2022-08-27 | End: 2022-08-27

## 2022-08-27 RX ORDER — KETOROLAC TROMETHAMINE 30 MG/ML
15 INJECTION, SOLUTION INTRAMUSCULAR; INTRAVENOUS ONCE
Status: COMPLETED | OUTPATIENT
Start: 2022-08-27 | End: 2022-08-27

## 2022-08-27 RX ADMIN — LIDOCAINE 5% 1 PATCH: 700 PATCH TOPICAL at 14:52

## 2022-08-27 RX ADMIN — ACETAMINOPHEN 975 MG: 325 TABLET ORAL at 14:52

## 2022-08-27 RX ADMIN — KETOROLAC TROMETHAMINE 15 MG: 30 INJECTION, SOLUTION INTRAMUSCULAR at 14:53

## 2022-08-27 NOTE — ED ATTENDING ATTESTATION
8/27/2022  I, Faye Malik MD, saw and evaluated the patient  I have discussed the patient with the resident/non-physician practitioner and agree with the resident's/non-physician practitioner's findings, Plan of Care, and MDM as documented in the resident's/non-physician practitioner's note, except where noted  All available labs and Radiology studies were reviewed  I was present for key portions of any procedure(s) performed by the resident/non-physician practitioner and I was immediately available to provide assistance  At this point I agree with the current assessment done in the Emergency Department    I have conducted an independent evaluation of this patient a history and physical is as follows:  Pt fell onto r hip on Wednesday Since then has developed r hip and back pain no weakness no numbness no b and b Pt has some heaviness to lower leg PE: alert tender si lateral hip and lateral calf good nv no redness no warmth no swelling pain with internal rotation and compression MDM: will do xray   ED Course         Critical Care Time  Procedures

## 2022-08-27 NOTE — DISCHARGE INSTRUCTIONS
You have been evaluated in the Emergency Department today for right hip pain  Your evaluation did not find evidence of medical conditions requiring emergent intervention at this time  Please rest, ice, and elevate your leg/hip, and resume normal activities as tolerated  You may take ibuprofen every 6 hours or tylenol every 6 hours as needed for pain  If there is an abnormality seen when radiology looks at your images, you will be contacted  Please schedule an appointment for follow up with your primary care physician this week  Return to the Emergency Department if you experience worsening pain, numbness, tingling, change of color in your toes, or any other concerning symptoms

## 2022-08-27 NOTE — ED PROVIDER NOTES
History  Chief Complaint   Patient presents with    Hip Injury     Pt presents ambulatory with c/o R hip injury from fall on Wednesday  Patient is a 68-year-old female, past medical history of hypothyroidism, who presents to the emergency department for right hip pain after a fall  Patient states 3 days ago she was kneeling down to do some home renovation when she accidentally lost balance  She states she landed on her right side  She did not strike her head or lose consciousness  About a day later she developed right-sided hip pain pain that is worse with movement  It is mainly located over the right iliac crest/glute  It does not radiate  She tried taking Motrin yesterday with minimal relief  There are no modifying factors  There are no associated symptoms  Patient denies any numbness, tingling, weakness, fevers, chills, bowel or bladder incontinence, saddle anesthesia, or any other new or concerning symptoms or risk factors  Prior to Admission Medications   Prescriptions Last Dose Informant Patient Reported?  Taking?   escitalopram (LEXAPRO) 10 mg tablet   No No   Sig: TAKE ONE TABLET BY MOUTH DAILY   ferrous sulfate 324 (65 Fe) mg  Self No No   Si tab daily at dinner   Patient taking differently: 1 tab daily at dinner    levothyroxine 88 mcg tablet   No No   Sig: TAKE ONE TABLET BY MOUTH EVERY DAY      Facility-Administered Medications: None       Past Medical History:   Diagnosis Date    Disease of thyroid gland     Hypothyroidism     Obesity        Past Surgical History:   Procedure Laterality Date    US GUIDED THYROID BIOPSY  2018       Family History   Problem Relation Age of Onset    No Known Problems Mother     Heart attack Father 39    Hypertension Father     Hyperlipidemia Father     Heart disease Father     No Known Problems Sister     No Known Problems Brother     No Known Problems Daughter     No Known Problems Son     No Known Problems Maternal Grandmother     No Known Problems Daughter     Breast cancer Neg Hx     Colon cancer Neg Hx     Ovarian cancer Neg Hx      I have reviewed and agree with the history as documented  E-Cigarette/Vaping    E-Cigarette Use Never User      E-Cigarette/Vaping Substances    Nicotine No     THC No     CBD No     Flavoring No     Other No     Unknown No      Social History     Tobacco Use    Smoking status: Former Smoker     Packs/day: 0 00     Years: 0 00     Pack years: 0 00     Quit date: 2015     Years since quittin 9    Smokeless tobacco: Never Used   Vaping Use    Vaping Use: Never used   Substance Use Topics    Alcohol use: Yes     Alcohol/week: 0 0 standard drinks     Comment: socially    Drug use: No        Review of Systems   Constitutional: Negative for chills and fever  Respiratory: Negative for cough and shortness of breath  Cardiovascular: Negative for chest pain  Gastrointestinal: Negative for abdominal pain, diarrhea, nausea and vomiting  Musculoskeletal: Positive for gait problem  Negative for back pain, neck pain and neck stiffness  Right hip pain   All other systems reviewed and are negative  Physical Exam  ED Triage Vitals   Temperature Pulse Respirations Blood Pressure SpO2   22 1244 22 1244 22 1244 22 1245 22 1244   97 7 °F (36 5 °C) 94 18 110/73 100 %      Temp Source Heart Rate Source Patient Position - Orthostatic VS BP Location FiO2 (%)   22 1244 22 1244 -- -- --   Temporal Monitor         Pain Score       --                    Orthostatic Vital Signs  Vitals:    22 1244 22 1245   BP:  110/73   Pulse: 94        Physical Exam  Vitals and nursing note reviewed  Constitutional:       General: She is not in acute distress  Appearance: She is well-developed  She is not diaphoretic  HENT:      Head: Normocephalic and atraumatic        Right Ear: External ear normal       Left Ear: External ear normal       Nose: Nose normal    Eyes:      General: Lids are normal  No scleral icterus  Cardiovascular:      Rate and Rhythm: Normal rate and regular rhythm  Heart sounds: Normal heart sounds  No murmur heard  No friction rub  No gallop  Pulmonary:      Effort: Pulmonary effort is normal  No respiratory distress  Breath sounds: Normal breath sounds  No wheezing or rales  Abdominal:      Palpations: Abdomen is soft  Tenderness: There is no abdominal tenderness  There is no guarding or rebound  Musculoskeletal:         General: No deformity  Normal range of motion  Cervical back: Normal range of motion and neck supple  Comments: There is full range of motion of the right leg  Right lower extremity compartments are soft  No overlying skin changes  There is mild tenderness to the right gluteus  Skin:     General: Skin is warm and dry  Neurological:      General: No focal deficit present  Mental Status: She is alert  Comments: Bilateral lower extremity strength 5/5  Bilateral lower extremity sensation intact and equal   Able to ambulate without assistance or difficulty  Psychiatric:         Mood and Affect: Mood normal          Behavior: Behavior normal          ED Medications  Medications   lidocaine (LIDODERM) 5 % patch 1 patch (1 patch Topical Medication Applied 8/27/22 1452)   acetaminophen (TYLENOL) tablet 975 mg (975 mg Oral Given 8/27/22 1452)   ketorolac (TORADOL) injection 15 mg (15 mg Intramuscular Given 8/27/22 1453)       Diagnostic Studies  Results Reviewed     None                 XR hip/pelv 2-3 vws right   Final Result by Gian Salvador MD (08/27 7394)      No acute osseous abnormality  Workstation performed: [id number]               Procedures  Procedures      ED Course  ED Course as of 08/27/22 1612   Sat Aug 27, 2022   1601 Patient re-evaluated  Resting comfortably  Discussed negative x-ray  Will discharge      Recommended Tylenol or Motrin as needed for pain  Recommended PCP follow-up  Return precautions discussed  Patient verbalized understanding and agreed to plan of care  MDM  Number of Diagnoses or Management Options  Fall, initial encounter  Right hip pain  Diagnosis management comments: Patient is a 31 y o  female who presents to the ED for right hip pain after a fall    Clinical impression is right hip strain  Low suspicion for fracture, dislocation  Plan:  Plain films, Tylenol, Toradol, lidocaine patch, reassessment                 Portions of the record may have been created with voice recognition software  Occasional wrong word or "sound a like" substitutions may have occurred due to the inherent limitations of voice recognition software  Read the chart carefully and recognize, using context, where substitutions have occurred  Amount and/or Complexity of Data Reviewed  Tests in the radiology section of CPT®: ordered  Independent visualization of images, tracings, or specimens: yes    Risk of Complications, Morbidity, and/or Mortality  Presenting problems: low  Diagnostic procedures: low  Management options: minimal    Patient Progress  Patient progress: stable      Disposition  Final diagnoses:   Fall, initial encounter   Right hip pain     Time reflects when diagnosis was documented in both MDM as applicable and the Disposition within this note     Time User Action Codes Description Comment    8/27/2022  3:17 PM Marcos Caraballo Add [R18  RGLE] Fall, initial encounter     8/27/2022  3:17 PM Marcos Caraballo Add [W05 [id number]] Right hip pain       ED Disposition     ED Disposition   Discharge    Condition   Stable    Date/Time   Sat Aug 27, 2022  3:17 PM    Comment   Rush Earthly discharge to home/self care                 Follow-up Information     Follow up With Specialties Details Why Contact Info Additional [address] FRIDA Borja Nurse Practitioner [address] , [address]  [address] [facility] Emergency Department Emergency Medicine  As needed [address] [phone]  0 [facility] [address], [address], Jose AngelRADHA [facility], [address]          Discharge Medication List as of 8/27/2022  3:56 PM      CONTINUE these medications which have NOT CHANGED    Details   escitalopram (LEXAPRO) 10 mg tablet TAKE ONE TABLET BY MOUTH DAILY, Normal      ferrous sulfate 324 (65 Fe) mg 1 tab daily at dinner, No Print      levothyroxine 88 mcg tablet TAKE ONE TABLET BY MOUTH EVERY DAY, Normal           No discharge procedures on file  PDMP Review       Value Time User    PDMP Reviewed  Yes 10/19/2021 10:33 AM FRIDA Stewart           ED Provider  Attending physically available and evaluated Isaac Ferris  I managed the patient along with the ED Attending      Electronically Signed by         Felix Hodges DO  08/27/22 6666

## 2022-08-29 ENCOUNTER — OFFICE VISIT (OUTPATIENT)
Dept: FAMILY MEDICINE CLINIC | Facility: CLINIC | Age: 37
End: 2022-08-29
Payer: COMMERCIAL

## 2022-08-29 ENCOUNTER — APPOINTMENT (OUTPATIENT)
Dept: LAB | Facility: HOSPITAL | Age: 37
End: 2022-08-29

## 2022-08-29 ENCOUNTER — APPOINTMENT (OUTPATIENT)
Dept: LAB | Facility: HOSPITAL | Age: 37
End: 2022-08-29
Payer: COMMERCIAL

## 2022-08-29 DIAGNOSIS — M76.31 IT BAND SYNDROME, RIGHT: Primary | ICD-10-CM

## 2022-08-29 DIAGNOSIS — E55.9 VITAMIN D DEFICIENCY: ICD-10-CM

## 2022-08-29 DIAGNOSIS — E61.1 IRON DEFICIENCY: ICD-10-CM

## 2022-08-29 DIAGNOSIS — Z00.8 HEALTH EXAMINATION IN POPULATION SURVEY: ICD-10-CM

## 2022-08-29 DIAGNOSIS — M70.61 TROCHANTERIC BURSITIS OF RIGHT HIP: ICD-10-CM

## 2022-08-29 DIAGNOSIS — E03.8 HYPOTHYROIDISM DUE TO HASHIMOTO'S THYROIDITIS: ICD-10-CM

## 2022-08-29 DIAGNOSIS — E06.3 HYPOTHYROIDISM DUE TO HASHIMOTO'S THYROIDITIS: ICD-10-CM

## 2022-08-29 LAB
25(OH)D3 SERPL-MCNC: 27.5 NG/ML (ref 30–100)
BASOPHILS # BLD AUTO: 0.07 THOUSANDS/ΜL (ref 0–0.1)
BASOPHILS NFR BLD AUTO: 1 % (ref 0–1)
CHOLEST SERPL-MCNC: 147 MG/DL
EOSINOPHIL # BLD AUTO: 0.13 THOUSAND/ΜL (ref 0–0.61)
EOSINOPHIL NFR BLD AUTO: 2 % (ref 0–6)
ERYTHROCYTE [DISTWIDTH] IN BLOOD BY AUTOMATED COUNT: 13.5 % (ref 11.6–15.1)
FERRITIN SERPL-MCNC: 22 NG/ML (ref 8–388)
HCT VFR BLD AUTO: 41.6 % (ref 34.8–46.1)
HDLC SERPL-MCNC: 60 MG/DL
HGB BLD-MCNC: 13.7 G/DL (ref 11.5–15.4)
IMM GRANULOCYTES # BLD AUTO: 0.03 THOUSAND/UL (ref 0–0.2)
IMM GRANULOCYTES NFR BLD AUTO: 0 % (ref 0–2)
LDLC SERPL CALC-MCNC: 75 MG/DL (ref 0–100)
LYMPHOCYTES # BLD AUTO: 1.84 THOUSANDS/ΜL (ref 0.6–4.47)
LYMPHOCYTES NFR BLD AUTO: 24 % (ref 14–44)
MCH RBC QN AUTO: 28.2 PG (ref 26.8–34.3)
MCHC RBC AUTO-ENTMCNC: 32.9 G/DL (ref 31.4–37.4)
MCV RBC AUTO: 86 FL (ref 82–98)
MONOCYTES # BLD AUTO: 0.62 THOUSAND/ΜL (ref 0.17–1.22)
MONOCYTES NFR BLD AUTO: 8 % (ref 4–12)
NEUTROPHILS # BLD AUTO: 5.1 THOUSANDS/ΜL (ref 1.85–7.62)
NEUTS SEG NFR BLD AUTO: 65 % (ref 43–75)
NONHDLC SERPL-MCNC: 87 MG/DL
NRBC BLD AUTO-RTO: 0 /100 WBCS
PLATELET # BLD AUTO: 347 THOUSANDS/UL (ref 149–390)
PMV BLD AUTO: 10.8 FL (ref 8.9–12.7)
RBC # BLD AUTO: 4.86 MILLION/UL (ref 3.81–5.12)
T4 FREE SERPL-MCNC: 1.23 NG/DL (ref 0.76–1.46)
TRIGL SERPL-MCNC: 62 MG/DL
TSH SERPL DL<=0.05 MIU/L-ACNC: 3.53 UIU/ML (ref 0.45–4.5)
WBC # BLD AUTO: 7.79 THOUSAND/UL (ref 4.31–10.16)

## 2022-08-29 PROCEDURE — 82306 VITAMIN D 25 HYDROXY: CPT

## 2022-08-29 PROCEDURE — 85025 COMPLETE CBC W/AUTO DIFF WBC: CPT

## 2022-08-29 PROCEDURE — 99213 OFFICE O/P EST LOW 20 MIN: CPT | Performed by: FAMILY MEDICINE

## 2022-08-29 PROCEDURE — 82728 ASSAY OF FERRITIN: CPT

## 2022-08-29 PROCEDURE — 84439 ASSAY OF FREE THYROXINE: CPT

## 2022-08-29 PROCEDURE — 83036 HEMOGLOBIN GLYCOSYLATED A1C: CPT

## 2022-08-29 PROCEDURE — 80061 LIPID PANEL: CPT

## 2022-08-29 PROCEDURE — 84443 ASSAY THYROID STIM HORMONE: CPT

## 2022-08-29 PROCEDURE — 36415 COLL VENOUS BLD VENIPUNCTURE: CPT

## 2022-08-29 RX ORDER — PREDNISONE 10 MG/1
TABLET ORAL
Qty: 21 EACH | Refills: 0 | Status: SHIPPED | OUTPATIENT
Start: 2022-08-29 | End: 2022-10-11

## 2022-08-29 NOTE — PROGRESS NOTES
Assessment/Plan:     Diagnoses and all orders for this visit:    It band syndrome, right  -     predniSONE 10 MG (21) TBPK; As directed take 6-5-4-3-2-1    Trochanteric bursitis of right hip  -     predniSONE 10 MG (21) TBPK; As directed take 6-5-4-3-2-1        Reviewed exercises for trochanteric bursitis and IT band syndrome  Patient will do heat and steroid taper as well as stretching and exercises and will call back if no improvement  Subjective:   Chief Complaint   Patient presents with    Leg Pain     Pt is here for right leg pain since last Wednesday  Pt states she woke up the other day w/ leg pain  Pt was seen in ED for this  Pt states sometimes the pain is dull and at times it is a stabbing pain       Patient ID: Lucas Augustin is a 31 y o  female  Leg Pain   The incident occurred 5 to 7 days ago  The incident occurred at home (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)  The injury mechanism was a fall (Lifting twisting)  The pain is present in the right thigh and right hip  The quality of the pain is described as aching and stabbing  The pain is at a severity of 6/10  The pain has been fluctuating since onset  Inability to bear weight:  direct pressure  She has tried ice, heat, acetaminophen and NSAIDs for the symptoms  Hip Pain   Inability to bear weight:  direct pressure  History from notes from the ED August 27th Patient states 3 days ago she was kneeling down to do some home renovation when she accidentally lost balance  She states she landed on her right side  She did not strike her head or lose consciousness  About a day later she developed right-sided hip pain pain that is worse with movement  It is mainly located over the right iliac crest/glute  It does not radiate  She tried taking Motrin yesterday with minimal relief  There are no modifying factors  There are no associated symptoms    Patient denies any numbness, tingling, weakness, fevers, chills, bowel or bladder incontinence, saddle anesthesia, or any other new or concerning symptoms or risk factors  Patient with x-rays from the ED from August 27th  Normal---emergency room notes reviewed  FINDINGS:     There is no acute fracture or dislocation      No significant hip degenerative changes      No lytic or blastic osseous lesion      Soft tissues are unremarkable      The visualized lumbar spine is unremarkable      IMPRESSION:     No acute osseous abnormality      Doing [place] at [employer]  The following portions of the patient's history were reviewed and updated as appropriate: allergies, current medications, past family history, past medical history, past social history, past surgical history and problem list       Review of Systems   Musculoskeletal:        As noted in HPI   All other systems reviewed and are negative  Objective:      /68   Pulse 81   Temp (!) 96 7 °F (35 9 °C) (Temporal)   Resp 16   Ht 5' 5" (1 651 m)   Wt 122 kg (268 lb)   SpO2 98%   BMI 44 60 kg/m²          Physical Exam  Constitutional:       Appearance: Normal appearance  Cardiovascular:      Rate and Rhythm: Normal rate  Pulmonary:      Effort: Pulmonary effort is normal    Abdominal:      General: Bowel sounds are normal       Palpations: Abdomen is soft  Musculoskeletal:      Right lower leg: No edema  Left lower leg: No edema  Comments: Negative SLR negative F ABR  There is direct tenderness over the IT band and right trochanteric bursa eliciting the same discomfort patient has been experiencing   Neurological:      Mental Status: She is alert and oriented to person, place, and time  Psychiatric:         Mood and Affect: Mood normal          Behavior: Behavior normal          Thought Content:  Thought content normal          Judgment: Judgment normal

## 2022-08-30 LAB
EST. AVERAGE GLUCOSE BLD GHB EST-MCNC: 111 MG/DL
HBA1C MFR BLD: 5.5 %

## 2022-08-31 VITALS
HEART RATE: 81 BPM | HEIGHT: 65 IN | TEMPERATURE: 96.7 F | DIASTOLIC BLOOD PRESSURE: 68 MMHG | WEIGHT: 268 LBS | RESPIRATION RATE: 16 BRPM | OXYGEN SATURATION: 98 % | BODY MASS INDEX: 44.65 KG/M2 | SYSTOLIC BLOOD PRESSURE: 126 MMHG

## 2022-09-27 DIAGNOSIS — E03.8 HYPOTHYROIDISM DUE TO HASHIMOTO'S THYROIDITIS: ICD-10-CM

## 2022-09-27 DIAGNOSIS — F41.1 GENERALIZED ANXIETY DISORDER: ICD-10-CM

## 2022-09-27 DIAGNOSIS — E06.3 HYPOTHYROIDISM DUE TO HASHIMOTO'S THYROIDITIS: ICD-10-CM

## 2022-09-27 RX ORDER — ESCITALOPRAM OXALATE 10 MG/1
10 TABLET ORAL DAILY
Qty: 90 TABLET | Refills: 0 | Status: SHIPPED | OUTPATIENT
Start: 2022-09-27 | End: 2022-10-03 | Stop reason: SDUPTHER

## 2022-09-27 RX ORDER — LEVOTHYROXINE SODIUM 88 UG/1
88 TABLET ORAL DAILY
Qty: 90 TABLET | Refills: 0 | Status: SHIPPED | OUTPATIENT
Start: 2022-09-27 | End: 2022-10-03 | Stop reason: SDUPTHER

## 2022-10-03 DIAGNOSIS — F41.1 GENERALIZED ANXIETY DISORDER: ICD-10-CM

## 2022-10-03 DIAGNOSIS — E06.3 HYPOTHYROIDISM DUE TO HASHIMOTO'S THYROIDITIS: ICD-10-CM

## 2022-10-03 DIAGNOSIS — E03.8 HYPOTHYROIDISM DUE TO HASHIMOTO'S THYROIDITIS: ICD-10-CM

## 2022-10-03 RX ORDER — LEVOTHYROXINE SODIUM 88 UG/1
88 TABLET ORAL DAILY
Qty: 90 TABLET | Refills: 0 | Status: SHIPPED | OUTPATIENT
Start: 2022-10-03 | End: 2022-10-03 | Stop reason: SDUPTHER

## 2022-10-03 RX ORDER — ESCITALOPRAM OXALATE 10 MG/1
10 TABLET ORAL DAILY
Qty: 90 TABLET | Refills: 0 | Status: SHIPPED | OUTPATIENT
Start: 2022-10-03 | End: 2022-10-11 | Stop reason: SDUPTHER

## 2022-10-03 RX ORDER — LEVOTHYROXINE SODIUM 88 UG/1
88 TABLET ORAL DAILY
Qty: 90 TABLET | Refills: 0 | Status: SHIPPED | OUTPATIENT
Start: 2022-10-03 | End: 2022-10-26 | Stop reason: SDUPTHER

## 2022-10-03 NOTE — TELEPHONE ENCOUNTER
Requested medication(s) are due for refill today: Yes  Patient has already received a courtesy refill: Yes  Other reason request has been forwarded to provider: Duplicated

## 2022-10-11 ENCOUNTER — OFFICE VISIT (OUTPATIENT)
Dept: FAMILY MEDICINE CLINIC | Facility: CLINIC | Age: 37
End: 2022-10-11
Payer: COMMERCIAL

## 2022-10-11 VITALS
TEMPERATURE: 97.3 F | HEART RATE: 67 BPM | OXYGEN SATURATION: 98 % | WEIGHT: 270 LBS | DIASTOLIC BLOOD PRESSURE: 72 MMHG | SYSTOLIC BLOOD PRESSURE: 118 MMHG | HEIGHT: 65 IN | BODY MASS INDEX: 44.98 KG/M2

## 2022-10-11 DIAGNOSIS — F41.1 GENERALIZED ANXIETY DISORDER: Primary | ICD-10-CM

## 2022-10-11 PROCEDURE — 99214 OFFICE O/P EST MOD 30 MIN: CPT | Performed by: NURSE PRACTITIONER

## 2022-10-11 RX ORDER — ESCITALOPRAM OXALATE 20 MG/1
20 TABLET ORAL DAILY
Qty: 90 TABLET | Refills: 1 | Status: SHIPPED | OUTPATIENT
Start: 2022-10-11

## 2022-10-11 RX ORDER — LORAZEPAM 0.5 MG/1
0.5 TABLET ORAL DAILY PRN
Qty: 30 TABLET | Refills: 0 | Status: SHIPPED | OUTPATIENT
Start: 2022-10-11

## 2022-10-11 NOTE — PATIENT INSTRUCTIONS
Increase lexapro to 20 mg daily  Can use ativan as needed daily for high anxiety  Recheck 4-6 weeks  How to find a therapist:  You can call the back of your insurance card for covered providers or check on your insurance's website  Or you can visit [employer] to find a covered therapist  Garry Ron can filter by your insurance type, location, and other preferences  This will give you a list with pictures and bios about the therapists so you can appropriately choose one you feel will be the most helpful for your goals  Anxiety   AMBULATORY CARE:   Anxiety  is a condition that causes you to feel extremely worried or nervous  The feelings are so strong that they can cause problems with your daily activities or sleep  Anxiety may be triggered by something you fear, or it may happen without a cause  Family or work stress, smoking, caffeine, and alcohol can increase your risk for anxiety  Certain medicines or health conditions can also increase your risk  Anxiety can become a long-term condition if it is not managed or treated  Common signs and symptoms that may occur with anxiety:   Fatigue or muscle tightness    Shaking, restlessness, or irritability    Problems focusing    Trouble sleeping    Feeling jumpy, easily startled, or dizzy    Rapid heartbeat or shortness of breath    Call your local emergency number (911 in the [address]) if:   You have chest pain, tightness, or heaviness that may spread to your shoulders, arms, jaw, neck, or back  You feel like hurting yourself or someone else  Call your doctor if:   Your symptoms get worse or do not get better with treatment  Your anxiety keeps you from doing your regular daily activities  You have new symptoms since your last visit  You have questions or concerns about your condition or care  Treatment for anxiety  may include medicines to help you feel calm and relaxed, and decrease your symptoms   Medicines are usually given together with therapy or other treatments  Manage anxiety:   Talk to someone about your anxiety  Your healthcare provider may suggest counseling  Cognitive behavioral therapy can help you understand and change how you react to events that trigger your symptoms  You might feel more comfortable talking with a friend or family member about your anxiety  Choose someone you know will be supportive and encouraging  Find ways to relax  Activities such as exercise, meditation, or listening to music can help you relax  Spend time with friends, or do things you enjoy  Practice deep breathing  Deep breathing can help you relax when you feel anxious  Focus on taking slow, deep breaths several times a day, or during an anxiety attack  Breathe in through your nose and out through your mouth  Create a regular sleep routine  Regular sleep can help you feel calmer during the day  Go to sleep and wake up at the same times every day  Do not watch television or use the computer right before bed  Your room should be comfortable, dark, and quiet  Eat a variety of healthy foods  Healthy foods include fruits, vegetables, low-fat dairy products, lean meats, fish, whole-grain breads, and cooked beans  Healthy foods can help you feel less anxious and have more energy  Exercise regularly  Exercise can increase your energy level  Exercise may also lift your mood and help you sleep better  Your healthcare provider can help you create an exercise plan  Do not smoke  Nicotine and other chemicals in cigarettes and cigars can increase anxiety  Ask your healthcare provider for information if you currently smoke and need help to quit  E-cigarettes or smokeless tobacco still contain nicotine  Talk to your healthcare provider before you use these products  Do not have caffeine  Caffeine can make your symptoms worse  Do not have foods or drinks that are meant to increase your energy level  Limit or do not drink alcohol    Ask your healthcare provider if alcohol is safe for you  You may not be able to drink alcohol if you take certain anxiety or depression medicines  Limit alcohol to 1 drink per day if you are a woman  Limit alcohol to 2 drinks per day if you are a man  A drink of alcohol is 12 ounces of beer, 5 ounces of wine, or 1½ ounces of liquor  Do not use drugs  Drugs can make your anxiety worse  It can also make anxiety hard to manage  Talk to your healthcare provider if you use drugs and want help to quit  Follow up with your doctor within 2 weeks or as directed:  Write down your questions so you remember to ask them during your visits  © Copyright [employer] 2022 Information is for End User's use only and may not be sold, redistributed or otherwise used for commercial purposes  All illustrations and images included in CareNotes® are the copyrighted property of [employer]  or Adrian Huerta   The above information is an  only  It is not intended as medical advice for individual conditions or treatments  Talk to your doctor, nurse or pharmacist before following any medical regimen to see if it is safe and effective for you

## 2022-10-11 NOTE — PROGRESS NOTES
Assessment/Plan:   Diagnosis ICD-10-CM Associated Orders   1  Generalized anxiety disorder  F41 1 escitalopram (LEXAPRO) 20 mg tablet     LORazepam (Ativan) 0 5 mg tablet     Increase lexapro to 20 mg daily  Can use ativan as needed daily for high anxiety  [place] prescription drug monitoring program was checked and verified for prescribing  Recheck 4-6 weeks  Encouraged therapy  Resources/information provided  Advised to call the office for any worsening of symptoms or no symptom improvement  Patient verbalizes understand and agrees with treatment plan  Diagnoses and all orders for this visit:    Generalized anxiety disorder  -     escitalopram (LEXAPRO) 20 mg tablet; Take 1 tablet (20 mg total) by mouth daily  -     LORazepam (Ativan) 0 5 mg tablet; Take 1 tablet (0 5 mg total) by mouth daily as needed for anxiety              Subjective:        Patient ID: Richelle Daugherty is a 31 y o  female  Chief Complaint   Patient presents with   • Anxiety     Pt has FMLA paperwork to be completed, Pt feels anxiety is worse states it is situational  Would like to discuss ADHD and possible testing        Patient presents with: Anxiety: Pt has FMLA paperwork to be completed, Pt feels anxiety is worse states it is situational  Would like to discuss ADHD and possible testing   She states she's had a lot of lifestyle changes in the past 4 months  She has had panic attacks at times  She has some nights where she can't sleep and her mind is racing and it takes hours to fall asleep  She states she has a good support system  She is not doing any individual therapy  She does use podcast resources for anxiety  The following portions of the patient's history were reviewed and updated as appropriate: allergies, current medications, past family history, past social history and problem list     Review of Systems   Constitutional: Negative for chills and fever  Eyes: Negative for discharge     Respiratory: Negative for shortness of breath  Cardiovascular: Negative for chest pain  Gastrointestinal: Negative for constipation and diarrhea  Genitourinary: Negative for difficulty urinating  Musculoskeletal: Negative for joint swelling  Skin: Negative for rash  Neurological: Negative for headaches  Hematological: Negative for adenopathy  Psychiatric/Behavioral: Positive for decreased concentration and sleep disturbance  The patient is nervous/anxious  Objective:  /72 (BP Location: Left arm, Patient Position: Sitting, Cuff Size: Large)   Pulse 67   Temp (!) 97 3 °F (36 3 °C) (Temporal)   Ht 5' 5" (1 651 m)   Wt 122 kg (270 lb)   LMP 10/04/2022 (Approximate)   SpO2 98%   BMI 44 93 kg/m²      Physical Exam  Vitals and nursing note reviewed  Constitutional:       General: She is not in acute distress  Appearance: She is well-developed  She is not diaphoretic  HENT:      Head: Normocephalic and atraumatic  Right Ear: External ear normal       Left Ear: External ear normal    Eyes:      General: Lids are normal          Right eye: No discharge  Left eye: No discharge  Conjunctiva/sclera: Conjunctivae normal    Pulmonary:      Effort: Pulmonary effort is normal  No respiratory distress  Musculoskeletal:         General: No deformity  Cervical back: Neck supple  Skin:     General: Skin is warm and dry  Neurological:      Mental Status: She is alert and oriented to person, place, and time  Psychiatric:         Speech: Speech normal          Behavior: Behavior normal          Thought Content:  Thought content normal          Judgment: Judgment normal                 Current Outpatient Medications:   •  escitalopram (LEXAPRO) 20 mg tablet, Take 1 tablet (20 mg total) by mouth daily, Disp: 90 tablet, Rfl: 1  •  levothyroxine 88 mcg tablet, Take 1 tablet (88 mcg total) by mouth daily, Disp: 90 tablet, Rfl: 0  •  LORazepam (Ativan) 0 5 mg tablet, Take 1 tablet (0 5 mg total) by mouth daily as needed for anxiety, Disp: 30 tablet, Rfl: 0  •  ferrous sulfate 324 (65 Fe) mg, 1 tab daily at dinner (Patient not taking: Reported on 10/11/2022), Disp: , Rfl:   No Known Allergies

## 2022-10-12 ENCOUNTER — TELEPHONE (OUTPATIENT)
Dept: FAMILY MEDICINE CLINIC | Facility: CLINIC | Age: 37
End: 2022-10-12

## 2022-10-26 ENCOUNTER — OFFICE VISIT (OUTPATIENT)
Dept: ENDOCRINOLOGY | Facility: CLINIC | Age: 37
End: 2022-10-26
Payer: COMMERCIAL

## 2022-10-26 VITALS
HEIGHT: 65 IN | BODY MASS INDEX: 44.82 KG/M2 | WEIGHT: 269 LBS | HEART RATE: 70 BPM | SYSTOLIC BLOOD PRESSURE: 110 MMHG | DIASTOLIC BLOOD PRESSURE: 72 MMHG

## 2022-10-26 DIAGNOSIS — E55.9 VITAMIN D DEFICIENCY: Primary | ICD-10-CM

## 2022-10-26 DIAGNOSIS — E06.3 HYPOTHYROIDISM DUE TO HASHIMOTO'S THYROIDITIS: ICD-10-CM

## 2022-10-26 DIAGNOSIS — E03.8 HYPOTHYROIDISM DUE TO HASHIMOTO'S THYROIDITIS: ICD-10-CM

## 2022-10-26 DIAGNOSIS — E61.1 IRON DEFICIENCY: ICD-10-CM

## 2022-10-26 DIAGNOSIS — E04.2 MULTIPLE THYROID NODULES: ICD-10-CM

## 2022-10-26 PROCEDURE — 99214 OFFICE O/P EST MOD 30 MIN: CPT | Performed by: PHYSICIAN ASSISTANT

## 2022-10-26 RX ORDER — LEVOTHYROXINE SODIUM 88 UG/1
88 TABLET ORAL DAILY
Qty: 90 TABLET | Refills: 3 | Status: SHIPPED | OUTPATIENT
Start: 2022-10-26

## 2022-10-26 NOTE — ASSESSMENT & PLAN NOTE
TSH normal and has been stable the past few years  Continue levothyroxine and repeat labs next summer

## 2023-01-09 DIAGNOSIS — F41.1 GENERALIZED ANXIETY DISORDER: ICD-10-CM

## 2023-01-12 RX ORDER — LORAZEPAM 0.5 MG/1
0.5 TABLET ORAL DAILY PRN
Qty: 30 TABLET | Refills: 0 | Status: SHIPPED | OUTPATIENT
Start: 2023-01-12

## 2023-01-12 RX ORDER — ESCITALOPRAM OXALATE 20 MG/1
20 TABLET ORAL DAILY
Qty: 90 TABLET | Refills: 0 | Status: SHIPPED | OUTPATIENT
Start: 2023-01-12

## 2023-02-06 ENCOUNTER — PATIENT MESSAGE (OUTPATIENT)
Dept: FAMILY MEDICINE CLINIC | Facility: CLINIC | Age: 38
End: 2023-02-06

## 2023-03-31 ENCOUNTER — OFFICE VISIT (OUTPATIENT)
Dept: FAMILY MEDICINE CLINIC | Facility: CLINIC | Age: 38
End: 2023-03-31

## 2023-03-31 VITALS
BODY MASS INDEX: 43.87 KG/M2 | WEIGHT: 273 LBS | DIASTOLIC BLOOD PRESSURE: 78 MMHG | OXYGEN SATURATION: 99 % | HEART RATE: 69 BPM | SYSTOLIC BLOOD PRESSURE: 108 MMHG | TEMPERATURE: 96.2 F | HEIGHT: 66 IN

## 2023-03-31 DIAGNOSIS — F41.1 GENERALIZED ANXIETY DISORDER: ICD-10-CM

## 2023-03-31 DIAGNOSIS — E06.3 HYPOTHYROIDISM DUE TO HASHIMOTO'S THYROIDITIS: ICD-10-CM

## 2023-03-31 DIAGNOSIS — E03.8 HYPOTHYROIDISM DUE TO HASHIMOTO'S THYROIDITIS: ICD-10-CM

## 2023-03-31 DIAGNOSIS — E66.01 MORBID OBESITY (HCC): Primary | ICD-10-CM

## 2023-03-31 DIAGNOSIS — E55.9 VITAMIN D DEFICIENCY: ICD-10-CM

## 2023-03-31 NOTE — PATIENT INSTRUCTIONS
Continue with current medications  How to find a therapist:  You can call the back of your insurance card for covered providers or check on your insurance's website  Or you can visit [employer] to find a covered therapist  Joselin Post can filter by your insurance type, location, and other preferences  This will give you a list with pictures and bios about the therapists so you can appropriately choose one you feel will be the most helpful for your goals  Complete labs, these are fasting  Re-check 6 months  Please call the office if you are experiencing any worsening of symptoms or no symptom improvement

## 2023-03-31 NOTE — PROGRESS NOTES
Name: Citlalli Salamanca      : 1985      MRN: [id number]  Encounter Provider: FRIDA Espinoza  Encounter Date: 3/31/2023   Encounter department: [address] [place] [address] Road     1  Morbid obesity ([facility] [address] )  Assessment & Plan:  Continue with healthy lifestyle and exercise  Orders:  -     Lipid panel; Future  -     Hemoglobin A1C; Future  -     Comprehensive metabolic panel; Future    2  Hypothyroidism due to Hashimoto's thyroiditis  Assessment & Plan:  Continue to follow with endocrinology for management      3  Generalized anxiety disorder  Assessment & Plan:  Stable on Lexapro 20 mg daily with Ativan to use as needed  FMLA paperwork updated today  4  Vitamin D deficiency  Assessment & Plan: Will update labs         Due for well exam   Labs ordered to be completed  Patient advised to follow-up for well exam   Follow-up 6 months  Subjective      Patient presents to the office today for follow-up regarding anxiety  Last time I saw patient was in 2022 at that time Lexapro was increased to 20 mg daily and she was giving Ativan to use as needed  Patient has been using FMLA for 2 days/month as needed for anxiety while she was working part-time  She is now working full-time and hoping to increase the amount of days to use as needed  Has been feeling well on the 20 mg lexapro  Using ativan about 2 x per week  Review of Systems   Constitutional: Negative for chills and fever  Eyes: Negative for discharge  Respiratory: Negative for shortness of breath  Cardiovascular: Negative for chest pain  Gastrointestinal: Negative for constipation and diarrhea  Genitourinary: Negative for difficulty urinating  Musculoskeletal: Negative for joint swelling  Skin: Negative for rash  Neurological: Negative for headaches  Hematological: Negative for adenopathy  Psychiatric/Behavioral: The patient is not nervous/anxious          Current Outpatient "Medications on File Prior to Visit   Medication Sig   • escitalopram (LEXAPRO) 20 mg tablet Take 1 tablet (20 mg total) by mouth daily   • levothyroxine 88 mcg tablet Take 1 tablet (88 mcg total) by mouth daily   • LORazepam (Ativan) 0 5 mg tablet Take 1 tablet (0 5 mg total) by mouth daily as needed for anxiety   • ferrous sulfate 324 (65 Fe) mg 1 tab daily at dinner (Patient not taking: Reported on 10/11/2022)       Objective     /78 (BP Location: Left arm, Patient Position: Sitting, Cuff Size: Large)   Pulse 69   Temp (!) 96 2 °F (35 7 °C) (Temporal)   Ht 5' 5 5\" (1 664 m)   Wt 124 kg (273 lb)   LMP 03/06/2023   SpO2 99%   BMI 44 74 kg/m²     Physical Exam  Vitals and nursing note reviewed  Constitutional:       General: She is not in acute distress  Appearance: She is well-developed  She is not diaphoretic  HENT:      Head: Normocephalic and atraumatic  Right Ear: External ear normal       Left Ear: External ear normal    Eyes:      General: Lids are normal          Right eye: No discharge  Left eye: No discharge  Conjunctiva/sclera: Conjunctivae normal    Cardiovascular:      Rate and Rhythm: Normal rate and regular rhythm  Heart sounds: No murmur heard  Pulmonary:      Effort: Pulmonary effort is normal  No respiratory distress  Musculoskeletal:         General: No deformity  Cervical back: Neck supple  Skin:     General: Skin is warm and dry  Neurological:      Mental Status: She is alert and oriented to person, place, and time  Psychiatric:         Speech: Speech normal          Behavior: Behavior normal          Thought Content:  Thought content normal          Judgment: Judgment normal        FRIDA Espinoza  "

## 2023-06-28 ENCOUNTER — TRANSCRIBE ORDERS (OUTPATIENT)
Dept: RADIOLOGY | Facility: HOSPITAL | Age: 38
End: 2023-06-28

## 2023-06-28 DIAGNOSIS — Z00.8 HEALTH EXAMINATION IN POPULATION SURVEY: Primary | ICD-10-CM

## 2023-07-05 ENCOUNTER — HOSPITAL ENCOUNTER (OUTPATIENT)
Dept: RADIOLOGY | Facility: HOSPITAL | Age: 38
Discharge: HOME | End: 2023-07-05
Payer: COMMERCIAL

## 2023-07-05 ENCOUNTER — APPOINTMENT (OUTPATIENT)
Dept: LAB | Facility: CLINIC | Age: 38
End: 2023-07-05
Payer: COMMERCIAL

## 2023-07-05 DIAGNOSIS — E06.3 HYPOTHYROIDISM DUE TO HASHIMOTO'S THYROIDITIS: ICD-10-CM

## 2023-07-05 DIAGNOSIS — E03.8 HYPOTHYROIDISM DUE TO HASHIMOTO'S THYROIDITIS: ICD-10-CM

## 2023-07-05 DIAGNOSIS — Z00.8 HEALTH EXAMINATION IN POPULATION SURVEY: ICD-10-CM

## 2023-07-05 DIAGNOSIS — E04.2 MULTIPLE THYROID NODULES: ICD-10-CM

## 2023-07-05 DIAGNOSIS — E66.01 MORBID OBESITY (HCC): ICD-10-CM

## 2023-07-05 DIAGNOSIS — E61.1 IRON DEFICIENCY: ICD-10-CM

## 2023-07-05 DIAGNOSIS — E55.9 VITAMIN D DEFICIENCY: ICD-10-CM

## 2023-07-05 LAB
25(OH)D3 SERPL-MCNC: 36.8 NG/ML (ref 30–100)
ALBUMIN SERPL BCP-MCNC: 3.6 G/DL (ref 3.5–5)
ALP SERPL-CCNC: 55 U/L (ref 46–116)
ALT SERPL W P-5'-P-CCNC: 39 U/L (ref 12–78)
ANION GAP SERPL CALCULATED.3IONS-SCNC: 0 MMOL/L
AST SERPL W P-5'-P-CCNC: 16 U/L (ref 5–45)
BASOPHILS # BLD AUTO: 0.06 THOUSANDS/ÂΜL (ref 0–0.1)
BASOPHILS NFR BLD AUTO: 1 % (ref 0–1)
BILIRUB SERPL-MCNC: 0.42 MG/DL (ref 0.2–1)
BUN SERPL-MCNC: 16 MG/DL (ref 5–25)
CALCIUM SERPL-MCNC: 9.1 MG/DL (ref 8.3–10.1)
CHLORIDE SERPL-SCNC: 112 MMOL/L (ref 96–108)
CHOLEST SERPL-MCNC: 142 MG/DL
CO2 SERPL-SCNC: 26 MMOL/L (ref 21–32)
CREAT SERPL-MCNC: 0.82 MG/DL (ref 0.6–1.3)
EOSINOPHIL # BLD AUTO: 0.18 THOUSAND/ÂΜL (ref 0–0.61)
EOSINOPHIL NFR BLD AUTO: 3 % (ref 0–6)
ERYTHROCYTE [DISTWIDTH] IN BLOOD BY AUTOMATED COUNT: 14 % (ref 11.6–15.1)
EST. AVERAGE GLUCOSE BLD GHB EST-MCNC: 100 MG/DL
FERRITIN SERPL-MCNC: 15 NG/ML (ref 11–307)
GFR SERPL CREATININE-BSD FRML MDRD: 93 ML/MIN/1.73SQ M
GLUCOSE P FAST SERPL-MCNC: 95 MG/DL (ref 65–99)
HBA1C MFR BLD: 5.1 %
HCT VFR BLD AUTO: 40.5 % (ref 34.8–46.1)
HDLC SERPL-MCNC: 53 MG/DL
HGB BLD-MCNC: 13.2 G/DL (ref 11.5–15.4)
IMM GRANULOCYTES # BLD AUTO: 0.02 THOUSAND/UL (ref 0–0.2)
IMM GRANULOCYTES NFR BLD AUTO: 0 % (ref 0–2)
LDLC SERPL CALC-MCNC: 78 MG/DL (ref 0–100)
LYMPHOCYTES # BLD AUTO: 2.01 THOUSANDS/ÂΜL (ref 0.6–4.47)
LYMPHOCYTES NFR BLD AUTO: 28 % (ref 14–44)
MCH RBC QN AUTO: 28.1 PG (ref 26.8–34.3)
MCHC RBC AUTO-ENTMCNC: 32.6 G/DL (ref 31.4–37.4)
MCV RBC AUTO: 86 FL (ref 82–98)
MONOCYTES # BLD AUTO: 0.57 THOUSAND/ÂΜL (ref 0.17–1.22)
MONOCYTES NFR BLD AUTO: 8 % (ref 4–12)
NEUTROPHILS # BLD AUTO: 4.26 THOUSANDS/ÂΜL (ref 1.85–7.62)
NEUTS SEG NFR BLD AUTO: 60 % (ref 43–75)
NONHDLC SERPL-MCNC: 89 MG/DL
NRBC BLD AUTO-RTO: 0 /100 WBCS
PLATELET # BLD AUTO: 307 THOUSANDS/UL (ref 149–390)
PMV BLD AUTO: 10.7 FL (ref 8.9–12.7)
POTASSIUM SERPL-SCNC: 4.3 MMOL/L (ref 3.5–5.3)
PROT SERPL-MCNC: 8 G/DL (ref 6.4–8.4)
RBC # BLD AUTO: 4.69 MILLION/UL (ref 3.81–5.12)
SODIUM SERPL-SCNC: 138 MMOL/L (ref 135–147)
T4 FREE SERPL-MCNC: 0.89 NG/DL (ref 0.61–1.12)
TRIGL SERPL-MCNC: 56 MG/DL
TSH SERPL DL<=0.05 MIU/L-ACNC: 3.53 UIU/ML (ref 0.45–4.5)
WBC # BLD AUTO: 7.1 THOUSAND/UL (ref 4.31–10.16)

## 2023-07-05 PROCEDURE — 82728 ASSAY OF FERRITIN: CPT

## 2023-07-05 PROCEDURE — 84439 ASSAY OF FREE THYROXINE: CPT

## 2023-07-05 PROCEDURE — 36415 COLL VENOUS BLD VENIPUNCTURE: CPT

## 2023-07-05 PROCEDURE — 83036 HEMOGLOBIN GLYCOSYLATED A1C: CPT

## 2023-07-05 PROCEDURE — 80061 LIPID PANEL: CPT

## 2023-07-05 PROCEDURE — 80053 COMPREHEN METABOLIC PANEL: CPT

## 2023-07-05 PROCEDURE — 85025 COMPLETE CBC W/AUTO DIFF WBC: CPT

## 2023-07-05 PROCEDURE — 76536 US EXAM OF HEAD AND NECK: CPT

## 2023-07-05 PROCEDURE — 82306 VITAMIN D 25 HYDROXY: CPT

## 2023-07-05 PROCEDURE — 84443 ASSAY THYROID STIM HORMONE: CPT

## 2023-07-10 DIAGNOSIS — M25.571 RIGHT ANKLE PAIN, UNSPECIFIED CHRONICITY: Primary | ICD-10-CM

## 2023-07-11 ENCOUNTER — HOSPITAL ENCOUNTER (OUTPATIENT)
Dept: RADIOLOGY | Facility: HOSPITAL | Age: 38
Discharge: HOME | End: 2023-07-11
Payer: COMMERCIAL

## 2023-07-11 DIAGNOSIS — M25.571 RIGHT ANKLE PAIN, UNSPECIFIED CHRONICITY: ICD-10-CM

## 2023-07-11 PROCEDURE — 73610 X-RAY EXAM OF ANKLE: CPT

## 2023-07-13 DIAGNOSIS — E06.3 HYPOTHYROIDISM DUE TO HASHIMOTO'S THYROIDITIS: ICD-10-CM

## 2023-07-13 DIAGNOSIS — E03.8 HYPOTHYROIDISM DUE TO HASHIMOTO'S THYROIDITIS: ICD-10-CM

## 2023-07-14 RX ORDER — LEVOTHYROXINE SODIUM 88 UG/1
88 TABLET ORAL DAILY
Qty: 90 TABLET | Refills: 0 | Status: SHIPPED | OUTPATIENT
Start: 2023-07-14

## 2023-07-24 DIAGNOSIS — M25.571 RIGHT ANKLE PAIN, UNSPECIFIED CHRONICITY: ICD-10-CM

## 2023-07-24 DIAGNOSIS — M77.31 CALCANEAL SPUR, RIGHT: Primary | ICD-10-CM

## 2023-08-29 ENCOUNTER — OFFICE VISIT (OUTPATIENT)
Dept: PODIATRY | Facility: CLINIC | Age: 38
End: 2023-08-29
Payer: COMMERCIAL

## 2023-08-29 VITALS
DIASTOLIC BLOOD PRESSURE: 76 MMHG | HEART RATE: 64 BPM | HEIGHT: 66 IN | BODY MASS INDEX: 43.36 KG/M2 | SYSTOLIC BLOOD PRESSURE: 110 MMHG | WEIGHT: 269.8 LBS

## 2023-08-29 DIAGNOSIS — M77.31 CALCANEAL SPUR, RIGHT: ICD-10-CM

## 2023-08-29 DIAGNOSIS — M76.821 POSTERIOR TIBIAL TENDON DYSFUNCTION, RIGHT: Primary | ICD-10-CM

## 2023-08-29 PROCEDURE — 99243 OFF/OP CNSLTJ NEW/EST LOW 30: CPT | Performed by: PODIATRIST

## 2023-08-29 NOTE — PROGRESS NOTES
This patient was seen on 8/29/23. My role is Foot , Ankle, and Wound Specialist    SUBJECTIVE    Chief Complaint:  Foot pain     Patient ID: Deana Quezada is a 30 y.o. female. Leyla Valencia is here with a cc of foot pain Right, pointing to the posterior tibial tendon just proximal to it's insertion on the navicular. She denies specific injury and also denies any heel pain. She presents wearing CROCS. The following portions of the patient's history were reviewed and updated as appropriate: allergies, current medications, past family history, past medical history, past social history, past surgical history and problem list.    Review of Systems   Constitutional: Negative. Respiratory: Negative. Musculoskeletal: Positive for gait problem and myalgias. OBJECTIVE      /76   Pulse 64   Ht 5' 5.5" (1.664 m)   Wt 122 kg (269 lb 12.8 oz)   BMI 44.21 kg/m²     Foot/Ankle Musculoskeletal Exam    General    Neurological: alert  General additional comments:  I note muscle function of the anterior, posterior, evertor and invertor muscle groups of the lower leg are intact and normal. I note some pain and difficulty on single heel rise Right. No problems double heel rise. I note ROM of the ankle joint, subtalar joint, Choparts and Lisfranc's joint complexes and metatarsophalangeal joints are WNL without crepitus nor restrictions bilaterally. Physical Exam  Vitals and nursing note reviewed. Constitutional:       General: She is not in acute distress. Appearance: Normal appearance. She is not ill-appearing, toxic-appearing or diaphoretic. HENT:      Head: Normocephalic. Musculoskeletal:         General: Tenderness present. Comments:  I note muscle function of the anterior, posterior, evertor and invertor muscle groups of the lower leg are intact and normal. I note some pain and difficulty on single heel rise Right. No problems double heel rise.  I note ROM of the ankle joint, subtalar joint, Choparts and Lisfranc's joint complexes and metatarsophalangeal joints are WNL without crepitus nor restrictions bilaterally. Skin:     General: Skin is warm. Capillary Refill: Capillary refill takes less than 2 seconds. Neurological:      General: No focal deficit present. Mental Status: She is alert and oriented to person, place, and time. ASSESSMENT     Diagnoses and all orders for this visit:    Posterior tibial tendon dysfunction, right  -     Ambulatory Referral to Podiatry  -     Ambulatory referral to Physical Therapy; Future    Calcaneal spur, right  -     Ambulatory Referral to Podiatry         Problem List Items Addressed This Visit        Musculoskeletal and Integument    Posterior tibial tendon dysfunction, right - Primary    Relevant Orders    Ambulatory referral to Physical Therapy   Other Visit Diagnoses     Calcaneal spur, right                  PLAN    I am going to have her go to PT for management of the Stage I PTTD, as well as liberal applications of ice. I also recommended against wearing any unsupportive shoegear for ambulation (CROCS, flip flops, etc) to better support the hindfoot and allow healing of the tendon.

## 2023-09-25 ENCOUNTER — OFFICE VISIT (OUTPATIENT)
Dept: FAMILY MEDICINE CLINIC | Facility: CLINIC | Age: 38
End: 2023-09-25
Payer: COMMERCIAL

## 2023-09-25 VITALS
WEIGHT: 269 LBS | HEIGHT: 66 IN | HEART RATE: 63 BPM | DIASTOLIC BLOOD PRESSURE: 74 MMHG | BODY MASS INDEX: 43.23 KG/M2 | OXYGEN SATURATION: 99 % | SYSTOLIC BLOOD PRESSURE: 118 MMHG | TEMPERATURE: 96.1 F

## 2023-09-25 DIAGNOSIS — F41.1 GENERALIZED ANXIETY DISORDER: Primary | ICD-10-CM

## 2023-09-25 DIAGNOSIS — E55.9 VITAMIN D DEFICIENCY: ICD-10-CM

## 2023-09-25 DIAGNOSIS — E03.8 HYPOTHYROIDISM DUE TO HASHIMOTO'S THYROIDITIS: ICD-10-CM

## 2023-09-25 DIAGNOSIS — Z02.89 ENCOUNTER FOR COMPLETION OF FORM WITH PATIENT: ICD-10-CM

## 2023-09-25 DIAGNOSIS — E06.3 HYPOTHYROIDISM DUE TO HASHIMOTO'S THYROIDITIS: ICD-10-CM

## 2023-09-25 DIAGNOSIS — E61.1 IRON DEFICIENCY: ICD-10-CM

## 2023-09-25 PROCEDURE — 99214 OFFICE O/P EST MOD 30 MIN: CPT | Performed by: NURSE PRACTITIONER

## 2023-09-25 NOTE — PROGRESS NOTES
Name: Wyatt Grover      : 1985      MRN: [id number]  Encounter Provider: FRIDA Louis  Encounter Date: 2023   Encounter department: [address] [place] [address]     1. Generalized anxiety disorder  Assessment & Plan:  Stable on lexapro 20 mg daily. continue with ativan PRN. FMLA paperwork completed      2. Vitamin D deficiency    3. Encounter for completion of form with patient    4. Hypothyroidism due to Hashimoto's thyroiditis  Assessment & Plan:  Stable. Continue to follow with endocrinology      5. Iron deficiency  Assessment & Plan:  Recent labs stable. Patient overall feeling well. Return in 6 months for well visit. Labs due next summer. Subjective      Patient presents to the office today for 6-month follow-up of anxiety. Labs done in July for review. Patient is overall doing well/feeling well. Anxiety stable on current medications. Rare use of Ativan. Needs FMLA forms updated. Current intermittent leave timing is working well for her. Review of Systems   Constitutional: Negative for chills and fever. Eyes: Negative for discharge. Respiratory: Negative for shortness of breath. Cardiovascular: Negative for chest pain. Gastrointestinal: Negative for constipation and diarrhea. Genitourinary: Negative for difficulty urinating. Musculoskeletal: Negative for joint swelling. Skin: Negative for rash. Neurological: Negative for headaches. Hematological: Negative for adenopathy. Psychiatric/Behavioral: The patient is not nervous/anxious.         Current Outpatient Medications on File Prior to Visit   Medication Sig   • escitalopram (LEXAPRO) 20 mg tablet Take 1 tablet (20 mg total) by mouth daily   • levothyroxine 88 mcg tablet Take 1 tablet (88 mcg total) by mouth daily   • LORazepam (Ativan) 0.5 mg tablet Take 1 tablet (0.5 mg total) by mouth daily as needed for anxiety   • ferrous sulfate 324 (65 Fe) mg 1 tab daily at dinner (Patient not taking: Reported on 10/11/2022)       Objective     /74 (BP Location: Left arm, Patient Position: Sitting, Cuff Size: Large)   Pulse 63   Temp (!) 96.1 °F (35.6 °C) (Temporal)   Ht 5' 5.5" (1.664 m)   Wt 122 kg (269 lb)   SpO2 99%   BMI 44.08 kg/m²     Physical Exam  Vitals and nursing note reviewed. Constitutional:       General: She is not in acute distress. Appearance: She is well-developed. She is obese. She is not diaphoretic. HENT:      Head: Normocephalic and atraumatic. Right Ear: External ear normal.      Left Ear: External ear normal.   Eyes:      General: Lids are normal.         Right eye: No discharge. Left eye: No discharge. Conjunctiva/sclera: Conjunctivae normal.   Cardiovascular:      Rate and Rhythm: Normal rate and regular rhythm. Heart sounds: No murmur heard. Pulmonary:      Effort: Pulmonary effort is normal. No respiratory distress. Breath sounds: Normal breath sounds. No wheezing. Musculoskeletal:         General: No deformity. Cervical back: Neck supple. Skin:     General: Skin is warm and dry. Neurological:      Mental Status: She is alert and oriented to person, place, and time. Psychiatric:         Speech: Speech normal.         Behavior: Behavior normal.         Thought Content:  Thought content normal.         Judgment: Judgment normal.       FRIDA Sargent

## 2023-10-13 DIAGNOSIS — E06.3 HYPOTHYROIDISM DUE TO HASHIMOTO'S THYROIDITIS: ICD-10-CM

## 2023-10-13 DIAGNOSIS — E03.8 HYPOTHYROIDISM DUE TO HASHIMOTO'S THYROIDITIS: ICD-10-CM

## 2023-10-13 RX ORDER — LEVOTHYROXINE SODIUM 88 UG/1
88 TABLET ORAL DAILY
Qty: 90 TABLET | Refills: 0 | Status: SHIPPED | OUTPATIENT
Start: 2023-10-13 | End: 2023-10-13 | Stop reason: SDUPTHER

## 2023-10-13 RX ORDER — LEVOTHYROXINE SODIUM 88 UG/1
88 TABLET ORAL DAILY
Qty: 90 TABLET | Refills: 0 | Status: SHIPPED | OUTPATIENT
Start: 2023-10-13

## 2023-11-06 ENCOUNTER — TELEPHONE (OUTPATIENT)
Dept: ENDOCRINOLOGY | Facility: CLINIC | Age: 38
End: 2023-11-06

## 2023-11-08 DIAGNOSIS — E03.8 HYPOTHYROIDISM DUE TO HASHIMOTO'S THYROIDITIS: Primary | ICD-10-CM

## 2023-11-08 DIAGNOSIS — E06.3 HYPOTHYROIDISM DUE TO HASHIMOTO'S THYROIDITIS: Primary | ICD-10-CM

## 2023-11-15 ENCOUNTER — NURSE TRIAGE (OUTPATIENT)
Dept: OTHER | Facility: OTHER | Age: 38
End: 2023-11-15

## 2023-11-15 ENCOUNTER — OFFICE VISIT (OUTPATIENT)
Dept: FAMILY MEDICINE CLINIC | Facility: CLINIC | Age: 38
End: 2023-11-15
Payer: COMMERCIAL

## 2023-11-15 VITALS
SYSTOLIC BLOOD PRESSURE: 122 MMHG | BODY MASS INDEX: 43.65 KG/M2 | WEIGHT: 271.6 LBS | TEMPERATURE: 98.5 F | HEIGHT: 66 IN | OXYGEN SATURATION: 99 % | HEART RATE: 97 BPM | DIASTOLIC BLOOD PRESSURE: 80 MMHG

## 2023-11-15 DIAGNOSIS — U07.1 COVID-19: ICD-10-CM

## 2023-11-15 DIAGNOSIS — B34.9 VIRAL INFECTION, UNSPECIFIED: Primary | ICD-10-CM

## 2023-11-15 LAB
SARS-COV-2 AG UPPER RESP QL IA: POSITIVE
VALID CONTROL: ABNORMAL

## 2023-11-15 PROCEDURE — 87811 SARS-COV-2 COVID19 W/OPTIC: CPT | Performed by: FAMILY MEDICINE

## 2023-11-15 PROCEDURE — 99213 OFFICE O/P EST LOW 20 MIN: CPT | Performed by: FAMILY MEDICINE

## 2023-11-15 RX ORDER — BROMPHENIRAMINE MALEATE, PSEUDOEPHEDRINE HYDROCHLORIDE, AND DEXTROMETHORPHAN HYDROBROMIDE 2; 30; 10 MG/5ML; MG/5ML; MG/5ML
5 SYRUP ORAL 4 TIMES DAILY PRN
Qty: 240 ML | Refills: 0 | Status: SHIPPED | OUTPATIENT
Start: 2023-11-15

## 2023-11-15 RX ORDER — METHYLPREDNISOLONE 4 MG/1
TABLET ORAL
Qty: 21 EACH | Refills: 0 | Status: SHIPPED | OUTPATIENT
Start: 2023-11-15

## 2023-11-15 NOTE — TELEPHONE ENCOUNTER
Reason for Disposition  • [1] Sinus pain (not just congestion) AND [2] fever    Answer Assessment - Initial Assessment Questions  1. LOCATION: "Where does it hurt?"       Pressure in bridge of nose/face, pain in ears    2. ONSET: "When did the sinus pain start?"  (e.g., hours, days)       This morning    3. SEVERITY: "How bad is the pain?"   (Scale 1-10; mild, moderate or severe)    - MILD (1-3): doesn't interfere with normal activities     - MODERATE (4-7): interferes with normal activities (e.g., work or school) or awakens from sleep    - SEVERE (8-10): excruciating pain and patient unable to do any normal activities         Moderate     4. RECURRENT SYMPTOM: "Have you ever had sinus problems before?" If Yes, ask: "When was the last time?" and "What happened that time?"       Yes, years ago    5. NASAL CONGESTION: "Is the nose blocked?" If Yes, ask: "Can you open it or must you breathe through the mouth?"      Yes, breathing through mouth    6. NASAL DISCHARGE: "Do you have discharge from your nose?" If so ask, "What color?"      Denies     7. FEVER: "Do you have a fever?" If Yes, ask: "What is it, how was it measured, and when did it start?"       Yes, 101 oral, started at 5:30pm this evening    8. OTHER SYMPTOMS: "Do you have any other symptoms?" (e.g., sore throat, cough, earache, difficulty breathing)      Cough, chills    9. PREGNANCY: "Is there any chance you are pregnant?" "When was your last menstrual period?"      Denies    Patient calling because she would like to schedule an appointment. Appointment scheduled with Dr. Lis Rocha on 11/15/2023 at 8:40 AM.    Home care advice provided. Pt verbalized understanding and was appreciative.     Protocols used: Sinus Pain or Congestion-ADULT-

## 2023-11-15 NOTE — PROGRESS NOTES
Chief Complaint   Patient presents with    Cough    Nasal Congestion      X 2 days. Taking dayquil     COVID-19     Name: Kimberly Morley      : 1985      MRN: [id number]  Encounter Provider: Jaron Tejada DO  Encounter Date: 11/15/2023   Encounter department: [facility]    Assessment & Plan     1. Viral infection, unspecified  Assessment & Plan:  Covid test is positive Patient to take the bromphed DM as directed Medrol dose brandy also discussed with patient the paxlovid Patient declines treatment patient has until Friday If symptoms worse and she changes her mind she will call me    Orders:  -     Poct Covid 19 Rapid Antigen Test    2. COVID-19           Subjective      Patient is here for cough and congestion since yesterday  Patient has low grade temp of 99 Patient notes mucous is clear Patient works in ED Patient did not do covid testing Patient has some sinus pain Patient did take dayquil with some help     URI   This is a new problem. The current episode started yesterday. The problem has been unchanged. The maximum temperature recorded prior to her arrival was 100.4 - 100.9 F. The fever has been present for Less than 1 day. Associated symptoms include congestion, coughing, rhinorrhea and sinus pain. Pertinent negatives include no abdominal pain, chest pain, diarrhea, dysuria, ear pain, headaches, joint pain, joint swelling, nausea, neck pain, plugged ear sensation, rash, sneezing, sore throat, swollen glands, vomiting or wheezing. Treatments tried: dayqul. The treatment provided moderate relief. Review of Systems   Constitutional:  Positive for fever. Negative for activity change, appetite change, chills and fatigue. HENT:  Positive for congestion, rhinorrhea and sinus pain. Negative for ear pain, sneezing and sore throat. Respiratory:  Positive for cough. Negative for wheezing. Cardiovascular:  Negative for chest pain.    Gastrointestinal:  Negative for abdominal pain, diarrhea, nausea and vomiting. Genitourinary:  Negative for dysuria. Musculoskeletal:  Negative for joint pain and neck pain. Skin:  Negative for rash. Neurological:  Negative for headaches. Current Outpatient Medications on File Prior to Visit   Medication Sig    escitalopram (LEXAPRO) 20 mg tablet Take 1 tablet (20 mg total) by mouth daily    ferrous sulfate 324 (65 Fe) mg 1 tab daily at dinner    levothyroxine 88 mcg tablet Take 1 tablet (88 mcg total) by mouth daily    LORazepam (Ativan) 0.5 mg tablet Take 1 tablet (0.5 mg total) by mouth daily as needed for anxiety       Objective     /80   Pulse 97   Temp 98.5 °F (36.9 °C)   Ht 5' 5.5" (1.664 m)   Wt 123 kg (271 lb 9.6 oz)   SpO2 99%   BMI 44.51 kg/m²     Physical Exam  Vitals and nursing note reviewed. Constitutional:       Appearance: Normal appearance. HENT:      Head: Normocephalic. Right Ear: Tympanic membrane and external ear normal.      Left Ear: Tympanic membrane and external ear normal.      Nose: Rhinorrhea present. Mouth/Throat:      Mouth: Mucous membranes are moist.      Pharynx: No oropharyngeal exudate or posterior oropharyngeal erythema. Eyes:      Extraocular Movements: Extraocular movements intact. Conjunctiva/sclera: Conjunctivae normal.      Pupils: Pupils are equal, round, and reactive to light. Cardiovascular:      Rate and Rhythm: Normal rate and regular rhythm. Heart sounds: Normal heart sounds. Pulmonary:      Breath sounds: Normal breath sounds. Neurological:      General: No focal deficit present. Mental Status: She is alert and oriented to person, place, and time.    Psychiatric:         Mood and Affect: Mood normal.         Behavior: Behavior normal.       Lucrecia Oseguera DO

## 2023-11-15 NOTE — ASSESSMENT & PLAN NOTE
Covid test is positive Patient to take the bromphed DM as directed Medrol dose brandy also discussed with patient the paxlovid Patient declines treatment patient has until Friday If symptoms worse and she changes her mind she will call me

## 2023-11-15 NOTE — TELEPHONE ENCOUNTER
Regarding: Pat Cuff  ----- Message from Wright sent at 11/15/2023 12:43 AM EST -----  "I have a fever 100.2-101, sinus congestion,chills, minor cough and headaches.  I would like to be seen in my doctors office."

## 2023-12-28 ENCOUNTER — TELEPHONE (OUTPATIENT)
Dept: FAMILY MEDICINE CLINIC | Facility: CLINIC | Age: 38
End: 2023-12-28

## 2023-12-28 DIAGNOSIS — E61.1 IRON DEFICIENCY: ICD-10-CM

## 2023-12-28 DIAGNOSIS — E55.9 VITAMIN D DEFICIENCY: ICD-10-CM

## 2023-12-28 DIAGNOSIS — Z00.8 EXAM FOR POPULATION SURVEY: Primary | ICD-10-CM

## 2023-12-28 DIAGNOSIS — E03.8 HYPOTHYROIDISM DUE TO HASHIMOTO'S THYROIDITIS: ICD-10-CM

## 2023-12-28 DIAGNOSIS — E06.3 HYPOTHYROIDISM DUE TO HASHIMOTO'S THYROIDITIS: ICD-10-CM

## 2023-12-28 NOTE — TELEPHONE ENCOUNTER
Patient has an upcoming appt. And would like her bloodwork place prior, so the results can be reviewed at the appt.     She is aware you are out of the office till next week.

## 2024-01-04 DIAGNOSIS — Z00.00 HEALTH CARE MAINTENANCE: ICD-10-CM

## 2024-01-04 DIAGNOSIS — E55.9 VITAMIN D DEFICIENCY: Primary | ICD-10-CM

## 2024-01-04 DIAGNOSIS — E61.1 IRON DEFICIENCY: ICD-10-CM

## 2024-01-05 ENCOUNTER — APPOINTMENT (OUTPATIENT)
Dept: LAB | Facility: CLINIC | Age: 39
End: 2024-01-05
Payer: COMMERCIAL

## 2024-01-05 ENCOUNTER — TELEPHONE (OUTPATIENT)
Dept: ENDOCRINOLOGY | Facility: CLINIC | Age: 39
End: 2024-01-05

## 2024-01-05 ENCOUNTER — TELEPHONE (OUTPATIENT)
Dept: FAMILY MEDICINE CLINIC | Facility: CLINIC | Age: 39
End: 2024-01-05

## 2024-01-05 DIAGNOSIS — Z00.00 HEALTH CARE MAINTENANCE: ICD-10-CM

## 2024-01-05 DIAGNOSIS — E03.8 HYPOTHYROIDISM DUE TO HASHIMOTO'S THYROIDITIS: ICD-10-CM

## 2024-01-05 DIAGNOSIS — E06.3 HYPOTHYROIDISM DUE TO HASHIMOTO'S THYROIDITIS: ICD-10-CM

## 2024-01-05 DIAGNOSIS — E61.1 IRON DEFICIENCY: ICD-10-CM

## 2024-01-05 DIAGNOSIS — Z00.8 EXAM FOR POPULATION SURVEY: ICD-10-CM

## 2024-01-05 DIAGNOSIS — E55.9 VITAMIN D DEFICIENCY: ICD-10-CM

## 2024-01-05 LAB
25(OH)D3 SERPL-MCNC: 21.2 NG/ML (ref 30–100)
ALBUMIN SERPL BCP-MCNC: 3.9 G/DL (ref 3.5–5)
ALP SERPL-CCNC: 52 U/L (ref 34–104)
ALT SERPL W P-5'-P-CCNC: 19 U/L (ref 7–52)
ANION GAP SERPL CALCULATED.3IONS-SCNC: 5 MMOL/L
AST SERPL W P-5'-P-CCNC: 16 U/L (ref 13–39)
BASOPHILS # BLD AUTO: 0.07 THOUSANDS/ÂΜL (ref 0–0.1)
BASOPHILS NFR BLD AUTO: 1 % (ref 0–1)
BILIRUB SERPL-MCNC: 0.5 MG/DL (ref 0.2–1)
BUN SERPL-MCNC: 11 MG/DL (ref 5–25)
CALCIUM SERPL-MCNC: 9.1 MG/DL (ref 8.4–10.2)
CHLORIDE SERPL-SCNC: 103 MMOL/L (ref 96–108)
CHOLEST SERPL-MCNC: 137 MG/DL
CO2 SERPL-SCNC: 30 MMOL/L (ref 21–32)
CREAT SERPL-MCNC: 0.69 MG/DL (ref 0.6–1.3)
EOSINOPHIL # BLD AUTO: 0.22 THOUSAND/ÂΜL (ref 0–0.61)
EOSINOPHIL NFR BLD AUTO: 2 % (ref 0–6)
ERYTHROCYTE [DISTWIDTH] IN BLOOD BY AUTOMATED COUNT: 13.3 % (ref 11.6–15.1)
EST. AVERAGE GLUCOSE BLD GHB EST-MCNC: 108 MG/DL
FERRITIN SERPL-MCNC: 13 NG/ML (ref 11–307)
GFR SERPL CREATININE-BSD FRML MDRD: 112 ML/MIN/1.73SQ M
GLUCOSE P FAST SERPL-MCNC: 95 MG/DL (ref 65–99)
HBA1C MFR BLD: 5.4 %
HCT VFR BLD AUTO: 40.3 % (ref 34.8–46.1)
HDLC SERPL-MCNC: 59 MG/DL
HGB BLD-MCNC: 13.3 G/DL (ref 11.5–15.4)
IMM GRANULOCYTES # BLD AUTO: 0.03 THOUSAND/UL (ref 0–0.2)
IMM GRANULOCYTES NFR BLD AUTO: 0 % (ref 0–2)
IRON SATN MFR SERPL: 21 % (ref 15–50)
IRON SERPL-MCNC: 77 UG/DL (ref 50–212)
LDLC SERPL CALC-MCNC: 65 MG/DL (ref 0–100)
LYMPHOCYTES # BLD AUTO: 2.45 THOUSANDS/ÂΜL (ref 0.6–4.47)
LYMPHOCYTES NFR BLD AUTO: 25 % (ref 14–44)
MAGNESIUM SERPL-MCNC: 1.9 MG/DL (ref 1.9–2.7)
MCH RBC QN AUTO: 28.4 PG (ref 26.8–34.3)
MCHC RBC AUTO-ENTMCNC: 33 G/DL (ref 31.4–37.4)
MCV RBC AUTO: 86 FL (ref 82–98)
MONOCYTES # BLD AUTO: 0.73 THOUSAND/ÂΜL (ref 0.17–1.22)
MONOCYTES NFR BLD AUTO: 7 % (ref 4–12)
NEUTROPHILS # BLD AUTO: 6.37 THOUSANDS/ÂΜL (ref 1.85–7.62)
NEUTS SEG NFR BLD AUTO: 65 % (ref 43–75)
NONHDLC SERPL-MCNC: 78 MG/DL
NRBC BLD AUTO-RTO: 0 /100 WBCS
PLATELET # BLD AUTO: 384 THOUSANDS/UL (ref 149–390)
PMV BLD AUTO: 10.1 FL (ref 8.9–12.7)
POTASSIUM SERPL-SCNC: 4.1 MMOL/L (ref 3.5–5.3)
PROT SERPL-MCNC: 7 G/DL (ref 6.4–8.4)
RBC # BLD AUTO: 4.68 MILLION/UL (ref 3.81–5.12)
SODIUM SERPL-SCNC: 138 MMOL/L (ref 135–147)
T4 FREE SERPL-MCNC: 1.08 NG/DL (ref 0.61–1.12)
TIBC SERPL-MCNC: 372 UG/DL (ref 250–450)
TRIGL SERPL-MCNC: 63 MG/DL
TSH SERPL DL<=0.05 MIU/L-ACNC: 5.25 UIU/ML (ref 0.45–4.5)
UIBC SERPL-MCNC: 295 UG/DL (ref 155–355)
VIT B12 SERPL-MCNC: 332 PG/ML (ref 180–914)
WBC # BLD AUTO: 9.87 THOUSAND/UL (ref 4.31–10.16)

## 2024-01-05 PROCEDURE — 83735 ASSAY OF MAGNESIUM: CPT | Performed by: FAMILY MEDICINE

## 2024-01-05 PROCEDURE — 84439 ASSAY OF FREE THYROXINE: CPT

## 2024-01-05 PROCEDURE — 82180 ASSAY OF ASCORBIC ACID: CPT

## 2024-01-05 PROCEDURE — 83540 ASSAY OF IRON: CPT

## 2024-01-05 PROCEDURE — 80053 COMPREHEN METABOLIC PANEL: CPT

## 2024-01-05 PROCEDURE — 80061 LIPID PANEL: CPT

## 2024-01-05 PROCEDURE — 83550 IRON BINDING TEST: CPT

## 2024-01-05 PROCEDURE — 36415 COLL VENOUS BLD VENIPUNCTURE: CPT

## 2024-01-05 PROCEDURE — 82607 VITAMIN B-12: CPT | Performed by: FAMILY MEDICINE

## 2024-01-05 PROCEDURE — 85025 COMPLETE CBC W/AUTO DIFF WBC: CPT

## 2024-01-05 PROCEDURE — 82306 VITAMIN D 25 HYDROXY: CPT

## 2024-01-05 PROCEDURE — 84443 ASSAY THYROID STIM HORMONE: CPT

## 2024-01-05 PROCEDURE — 82728 ASSAY OF FERRITIN: CPT

## 2024-01-05 PROCEDURE — 83036 HEMOGLOBIN GLYCOSYLATED A1C: CPT

## 2024-01-05 RX ORDER — LEVOTHYROXINE SODIUM 0.1 MG/1
100 TABLET ORAL DAILY
Qty: 30 TABLET | Refills: 3 | Status: SHIPPED | OUTPATIENT
Start: 2024-01-05

## 2024-01-05 NOTE — TELEPHONE ENCOUNTER
----- Message from Crystal Holden DO sent at 1/5/2024 11:29 AM EST -----  Please call the patient regarding her abnormal result. Tsh is elevated and ask patient if patient takes her thyroid med every day and is compliant, if yes, then levothyroxine needs to be increased to next higher dose and recheck tsh and t4 free in 8 weeks.

## 2024-01-09 LAB — VIT C SERPL-MCNC: 0.6 MG/DL (ref 0.4–2)

## 2024-01-09 NOTE — ASSESSMENT & PLAN NOTE
Chronic control problem for patient.  Currently on clonazepam 1 mg p.o. b.i.d..     Repeat ultrasound 6/2021

## 2024-01-26 NOTE — PROGRESS NOTES
"Subjective      Mary Whitfield is a 38 y.o. female who presents for annual well woman exam.  Last Pap smear 18 NILM/ HR HPV(-). Periods are regular every 28-30 days, lasting 4 days. No intermenstrual bleeding, spotting, or discharge.  Requesting STI testing.  Denies known exposure or symptoms.    Current contraception: condoms  History of abnormal Pap smear: no  Family history of uterine or ovarian cancer: no  Regular self breast exam: no  History of abnormal mammogram: Mammograms to begin at age 40 unless otherwise clinically indicated  Family history of breast cancer: no  History of abnormal lipids: no  Gardasil vaccine: unsure       Menstrual History:  OB History          3    Para   3    Term   2            AB        Living   3         SAB        IAB        Ectopic        Multiple        Live Births   3           Obstetric Comments   Menarche age 13               Menarche age: 13  Patient's last menstrual period was 2024 (exact date).  Period Cycle (Days):  (monthly)  Period Duration (Days): 4  Period Pattern: Regular  Menstrual Flow: Heavy, Moderate, Light (heavy for 1-2 days)  Menstrual Control: Tampon  Menstrual Control Change Freq (Hours): 2 hrs  Dysmenorrhea: (!) Mild  Dysmenorrhea Symptoms: Diarrhea, Cramping    The following portions of the patient's history were reviewed and updated as appropriate: allergies, current medications, past family history, past medical history, past social history, past surgical history, and problem list.    Review of Systems  Pertinent items are noted in HPI.      Objective      /82   Ht 5' 5\" (1.651 m)   Wt 126 kg (277 lb)   LMP 2024 (Exact Date)   BMI 46.10 kg/m²     General:   alert and oriented, in no acute distress, alert, morbidly obese, appears stated age, and cooperative   Heart: regular rate and rhythm, S1, S2 normal, no murmur, click, rub or gallop   Lungs: clear to auscultation bilaterally   Abdomen: soft, non-tender, " without masses or organomegaly   Vulva: normal, Bartholin's, Urethra, Parmelee's normal   Vagina: normal mucosa, normal discharge, no palpable nodules   Cervix: no bleeding following Pap, no cervical motion tenderness, and no lesions   Uterus: non-tender, difficult to assess due to body habitus   Adnexa: Nontender; difficult to assess due to body habitus   Breast: breasts appear normal, no suspicious masses, no skin or nipple changes or axillary nodes.              Assessment      @well woman@ .     Plan      All questions answered.  Await pap smear results.  Blood tests: Gonorrhea/chlamydia, hepatitis B, hepatitis C, HIV and syphilis screening ordered per patient request.  Breast self exam technique reviewed and patient encouraged to perform self-exam monthly.  Chlamydia specimen.  Contraception: condoms.  Diagnosis explained in detail, including differential.  Dietary diary.  Discussed healthy lifestyle modifications.  Educational material distributed.  Follow up in 1 years.  Follow up as needed.  GC specimen.  Thin prep Pap smear.  Breast awareness reviewed   Encouraged healthy diet, exercise and lifestyle  Encouraged follow-up with PCP as needed  Encouraged seasonal influenza vaccination  Gardasil vaccine series reviewed.  Written information provided.  Encouraged social distancing, good hand hygiene, avoidance of crowds and masking.  Written information provided about COVID-19    Will call / [facility] [employer] message with results  VBI-    BMI Counseling: Body mass index is 46.1 kg/m². The BMI is above normal. Nutrition recommendations include 3-5 servings of fruits/vegetables daily. Exercise recommendations include exercising 3-5 times per week.

## 2024-01-29 ENCOUNTER — ANNUAL EXAM (OUTPATIENT)
Dept: OBGYN CLINIC | Facility: MEDICAL CENTER | Age: 39
End: 2024-01-29
Payer: COMMERCIAL

## 2024-01-29 ENCOUNTER — APPOINTMENT (OUTPATIENT)
Dept: LAB | Facility: MEDICAL CENTER | Age: 39
End: 2024-01-29
Payer: COMMERCIAL

## 2024-01-29 ENCOUNTER — OFFICE VISIT (OUTPATIENT)
Dept: ENDOCRINOLOGY | Facility: CLINIC | Age: 39
End: 2024-01-29
Payer: COMMERCIAL

## 2024-01-29 VITALS
DIASTOLIC BLOOD PRESSURE: 80 MMHG | OXYGEN SATURATION: 99 % | BODY MASS INDEX: 46.38 KG/M2 | HEART RATE: 72 BPM | HEIGHT: 65 IN | SYSTOLIC BLOOD PRESSURE: 128 MMHG | WEIGHT: 278.4 LBS

## 2024-01-29 VITALS
WEIGHT: 277 LBS | BODY MASS INDEX: 46.15 KG/M2 | HEIGHT: 65 IN | SYSTOLIC BLOOD PRESSURE: 126 MMHG | DIASTOLIC BLOOD PRESSURE: 82 MMHG

## 2024-01-29 DIAGNOSIS — E06.3 HYPOTHYROIDISM DUE TO HASHIMOTO'S THYROIDITIS: ICD-10-CM

## 2024-01-29 DIAGNOSIS — E04.2 MULTIPLE THYROID NODULES: ICD-10-CM

## 2024-01-29 DIAGNOSIS — Z72.51 HIGH RISK SEXUAL BEHAVIOR, UNSPECIFIED TYPE: ICD-10-CM

## 2024-01-29 DIAGNOSIS — E61.1 IRON DEFICIENCY: ICD-10-CM

## 2024-01-29 DIAGNOSIS — E03.8 HYPOTHYROIDISM DUE TO HASHIMOTO'S THYROIDITIS: ICD-10-CM

## 2024-01-29 DIAGNOSIS — E66.01 CLASS 3 SEVERE OBESITY WITH BODY MASS INDEX (BMI) OF 45.0 TO 49.9 IN ADULT, UNSPECIFIED OBESITY TYPE, UNSPECIFIED WHETHER SERIOUS COMORBIDITY PRESENT (HCC): ICD-10-CM

## 2024-01-29 DIAGNOSIS — E55.9 VITAMIN D DEFICIENCY: Primary | ICD-10-CM

## 2024-01-29 DIAGNOSIS — Z01.419 WELL WOMAN EXAM WITH ROUTINE GYNECOLOGICAL EXAM: Primary | ICD-10-CM

## 2024-01-29 PROBLEM — M76.821 POSTERIOR TIBIAL TENDON DYSFUNCTION, RIGHT: Status: RESOLVED | Noted: 2023-08-29 | Resolved: 2024-01-29

## 2024-01-29 PROBLEM — E66.813 CLASS 3 SEVERE OBESITY WITH BODY MASS INDEX (BMI) OF 45.0 TO 49.9 IN ADULT (HCC): Status: ACTIVE | Noted: 2024-01-29

## 2024-01-29 PROBLEM — B34.9 VIRAL INFECTION, UNSPECIFIED: Status: RESOLVED | Noted: 2023-11-15 | Resolved: 2024-01-29

## 2024-01-29 PROBLEM — L65.9 HAIR LOSS: Status: RESOLVED | Noted: 2020-08-06 | Resolved: 2024-01-29

## 2024-01-29 PROBLEM — U07.1 COVID-19: Status: RESOLVED | Noted: 2023-11-15 | Resolved: 2024-01-29

## 2024-01-29 LAB
HBV SURFACE AG SER QL: NORMAL
HCV AB SER QL: NORMAL
HIV 1+2 AB+HIV1 P24 AG SERPL QL IA: NORMAL
HIV 2 AB SERPL QL IA: NORMAL
HIV1 AB SERPL QL IA: NORMAL
HIV1 P24 AG SERPL QL IA: NORMAL
TREPONEMA PALLIDUM IGG+IGM AB [PRESENCE] IN SERUM OR PLASMA BY IMMUNOASSAY: NORMAL

## 2024-01-29 PROCEDURE — 86780 TREPONEMA PALLIDUM: CPT

## 2024-01-29 PROCEDURE — 87591 N.GONORRHOEAE DNA AMP PROB: CPT | Performed by: NURSE PRACTITIONER

## 2024-01-29 PROCEDURE — G0145 SCR C/V CYTO,THINLAYER,RESCR: HCPCS | Performed by: NURSE PRACTITIONER

## 2024-01-29 PROCEDURE — 87389 HIV-1 AG W/HIV-1&-2 AB AG IA: CPT

## 2024-01-29 PROCEDURE — S0612 ANNUAL GYNECOLOGICAL EXAMINA: HCPCS | Performed by: NURSE PRACTITIONER

## 2024-01-29 PROCEDURE — 87491 CHLMYD TRACH DNA AMP PROBE: CPT | Performed by: NURSE PRACTITIONER

## 2024-01-29 PROCEDURE — 86803 HEPATITIS C AB TEST: CPT

## 2024-01-29 PROCEDURE — 87340 HEPATITIS B SURFACE AG IA: CPT

## 2024-01-29 PROCEDURE — G0476 HPV COMBO ASSAY CA SCREEN: HCPCS | Performed by: NURSE PRACTITIONER

## 2024-01-29 PROCEDURE — 99214 OFFICE O/P EST MOD 30 MIN: CPT | Performed by: PHYSICIAN ASSISTANT

## 2024-01-29 PROCEDURE — 36415 COLL VENOUS BLD VENIPUNCTURE: CPT

## 2024-01-29 RX ORDER — ACETAMINOPHEN 160 MG
2000 TABLET,DISINTEGRATING ORAL DAILY
Start: 2024-01-29 | End: 2024-01-29 | Stop reason: ALTCHOICE

## 2024-01-29 RX ORDER — LEVOTHYROXINE SODIUM 0.1 MG/1
100 TABLET ORAL DAILY
Qty: 90 TABLET | Refills: 1 | Status: SHIPPED | OUTPATIENT
Start: 2024-01-29

## 2024-01-29 NOTE — ASSESSMENT & PLAN NOTE
TSH was high, Levothyroxine was increased to 100mcg daily by Dr. Holden a few weeks ago. She is feeling much better since that change. Repeat labs as ordered in about 1 month.

## 2024-01-29 NOTE — PROGRESS NOTES
Established Patient Progress Note     CC: Endocrinology follow up visit    Impression & Plan:    Problem List Items Addressed This Visit        Endocrine    Hypothyroidism due to Hashimoto's thyroiditis     TSH was high, Levothyroxine was increased to 100mcg daily by Dr. Holden a few weeks ago. She is feeling much better since that change. Repeat labs as ordered in about 1 month.          Relevant Medications    levothyroxine 100 mcg tablet    Other Relevant Orders    TSH, 3rd generation    T4, free    Multiple thyroid nodules     Stable on 7/2023 ultrasound. Will order repeat testing at next visit.          Relevant Medications    levothyroxine 100 mcg tablet       Other    Vitamin D deficiency - Primary     Resume Vitamin D 5,000 units daily.          Relevant Orders    Vitamin D 25 hydroxy    Iron deficiency     Ferritin low-- she has Resumed Iron supplements         Relevant Orders    Iron Panel (Includes Ferritin, Iron Sat%, Iron, and TIBC)    Class 3 severe obesity with body mass index (BMI) of 45.0 to 49.9 in adult (HCC)     Refer to dietician for MNT and will also refer to [facility] Employee health coaching. Discussed importance of Lifestyle modification and weight loss. Suggest stopping sweetened coffee drinks and drink coffee at home.  If she has not had success we can discuss pharmacologic therapy at the next visit, but she would like to avoid medication if possible.          Relevant Medications    Cholecalciferol (Vitamin D3) 125 MCG (5000 UT) CAPS    Other Relevant Orders    Ambulatory referral to Nutrition Services    Ambulatory Referral to Employee Health  Program         History of Present Illness:     Mary Whitfield is a 38 y.o. female with a history of hypothyroidism, Iron Deficiency, Vitamin D Deficiency, and thyroid nodules.    Recently, she felt very tired and depressed and was found to have a high TSH.  Dr. Holden increased her levothyroxine dose to 100mcg about 1 month ago and  she is feeling better.  She also has been off Iron and Vitamin D supplements- but has resumed.  She is having difficulty with weight loss and has gained weight since her last visit.    She has started exercising once per week but schedule is difficult due to work/kids sports.  She does drink a lot of sweetened coffee drinks.     Patient Active Problem List   Diagnosis   • Generalized anxiety disorder   • Hypothyroidism due to Hashimoto's thyroiditis   • Multiple thyroid nodules   • Vitamin D deficiency   • Iron deficiency   • Class 3 severe obesity with body mass index (BMI) of 45.0 to 49.9 in adult (HCC)      Past Medical History:   Diagnosis Date   • Hypothyroidism    • Obesity       Past Surgical History:   Procedure Laterality Date   • US GUIDED THYROID BIOPSY  2018      Family History   Problem Relation Age of Onset   • No Known Problems Mother    • Heart attack Father 45   • Hypertension Father    • Hyperlipidemia Father    • Heart disease Father    • No Known Problems Sister    • No Known Problems Brother    • No Known Problems Daughter    • No Known Problems Son    • No Known Problems Maternal Grandmother    • No Known Problems Daughter    • Breast cancer Neg Hx    • Colon cancer Neg Hx    • Ovarian cancer Neg Hx      Social History     Tobacco Use   • Smoking status: Former     Current packs/day: 0.00     Types: Cigarettes     Quit date: 2015     Years since quittin.4   • Smokeless tobacco: Never   Substance Use Topics   • Alcohol use: Yes     Alcohol/week: 0.0 standard drinks of alcohol     Comment: socially     No Known Allergies    Current Outpatient Medications:   •  Cholecalciferol (Vitamin D3) 125 MCG (5000 UT) CAPS, 1 cap daily, Disp: , Rfl:   •  levothyroxine 100 mcg tablet, Take 1 tablet (100 mcg total) by mouth daily, Disp: 90 tablet, Rfl: 1  •  escitalopram (LEXAPRO) 20 mg tablet, Take 1 tablet (20 mg total) by mouth daily, Disp: 90 tablet, Rfl: 0  •  ferrous sulfate 324 (65 Fe) mg,  "1 tab daily at dinner, Disp: , Rfl:   •  LORazepam (Ativan) 0.5 mg tablet, Take 1 tablet (0.5 mg total) by mouth daily as needed for anxiety, Disp: 30 tablet, Rfl: 0    Review of Systems   Constitutional:  Positive for fatigue. Negative for activity change, appetite change, chills, diaphoresis, fever and unexpected weight change.   HENT:  Negative for trouble swallowing and voice change.    Eyes:  Negative for visual disturbance.   Respiratory:  Negative for shortness of breath.    Cardiovascular:  Negative for chest pain and palpitations.   Gastrointestinal:  Negative for abdominal pain, constipation and diarrhea.   Endocrine: Negative for cold intolerance, heat intolerance, polydipsia, polyphagia and polyuria.   Genitourinary:  Positive for menstrual problem. Negative for frequency.   Musculoskeletal:  Negative for arthralgias and myalgias.   Skin:  Negative for rash.   Allergic/Immunologic: Negative for food allergies.   Neurological:  Negative for dizziness and tremors.   Hematological:  Negative for adenopathy.   Psychiatric/Behavioral:  Negative for sleep disturbance.    All other systems reviewed and are negative.      Physical Exam:  Body mass index is 46.33 kg/m².  /80 (BP Location: Left arm, Patient Position: Sitting, Cuff Size: Adult)   Pulse 72   Ht 5' 5\" (1.651 m)   Wt 126 kg (278 lb 6.4 oz)   SpO2 99%   BMI 46.33 kg/m²    Wt Readings from Last 3 Encounters:   01/29/24 126 kg (278 lb 6.4 oz)   01/29/24 126 kg (277 lb)   11/15/23 123 kg (271 lb 9.6 oz)       Physical Exam  Vitals reviewed.   Constitutional:       General: She is not in acute distress.     Appearance: Normal appearance. She is well-developed. She is not toxic-appearing.   HENT:      Head: Normocephalic and atraumatic.   Eyes:      Conjunctiva/sclera: Conjunctivae normal.      Pupils: Pupils are equal, round, and reactive to light.   Neck:      Thyroid: Thyromegaly present.   Cardiovascular:      Rate and Rhythm: Normal rate and " regular rhythm.      Heart sounds: Normal heart sounds.   Pulmonary:      Effort: Pulmonary effort is normal. No respiratory distress.      Breath sounds: Normal breath sounds. No wheezing or rales.   Abdominal:      General: Bowel sounds are normal. There is no distension.      Palpations: Abdomen is soft.      Tenderness: There is no abdominal tenderness.   Musculoskeletal:         General: Normal range of motion.      Cervical back: Normal range of motion and neck supple.   Skin:     General: Skin is warm and dry.   Neurological:      Mental Status: She is alert and oriented to person, place, and time.   Psychiatric:         Mood and Affect: Mood normal.         Behavior: Behavior normal.         Labs:   Component      Latest Ref Rn 1/5/2024   WBC      4.31 - 10.16 Thousand/uL 9.87    RBC      3.81 - 5.12 Million/uL 4.68    Hemoglobin      11.5 - 15.4 g/dL 13.3    Hematocrit      34.8 - 46.1 % 40.3    MCV      82 - 98 fL 86    MCH      26.8 - 34.3 pg 28.4    MCHC      31.4 - 37.4 g/dL 33.0    RDW      11.6 - 15.1 % 13.3    MPV      8.9 - 12.7 fL 10.1    Platelet Count      149 - 390 Thousands/uL 384    nRBC      /100 WBCs 0    Neutrophils %      43 - 75 % 65    Immat GRANS %      0 - 2 % 0    Lymphocytes Relative      14 - 44 % 25    Monocytes Relative      4 - 12 % 7    Eosinophils      0 - 6 % 2    Basophils Relative      0 - 1 % 1    Absolute Neutrophils      1.85 - 7.62 Thousands/µL 6.37    Immature Grans Absolute      0.00 - 0.20 Thousand/uL 0.03    Lymphocytes Absolute      0.60 - 4.47 Thousands/µL 2.45    Absolute Monocytes      0.17 - 1.22 Thousand/µL 0.73    Absolute Eosinophils      0.00 - 0.61 Thousand/µL 0.22    Basophils Absolute      0.00 - 0.10 Thousands/µL 0.07    Sodium      135 - 147 mmol/L 138    Potassium      3.5 - 5.3 mmol/L 4.1    Chloride      96 - 108 mmol/L 103    Carbon Dioxide      21 - 32 mmol/L 30    ANION GAP      mmol/L 5    BUN      5 - 25 mg/dL 11    Creatinine      0.60 - 1.30  mg/dL 0.69    GLUCOSE, FASTING      65 - 99 mg/dL 95    Calcium      8.4 - 10.2 mg/dL 9.1    AST      13 - 39 U/L 16    ALT      7 - 52 U/L 19    ALK PHOS      34 - 104 U/L 52    Total Protein      6.4 - 8.4 g/dL 7.0    Albumin      3.5 - 5.0 g/dL 3.9    Total Bilirubin      0.20 - 1.00 mg/dL 0.50    GFR, Calculated      ml/min/1.73sq m 112    Cholesterol      See Comment mg/dL 137    Triglycerides      See Comment mg/dL 63    HDL      >=50 mg/dL 59    LDL Calculated      0 - 100 mg/dL 65    Non-HDL Cholesterol      mg/dl 78    Iron Saturation      15 - 50 % 21    TIBC      250 - 450 ug/dL 372    Iron      50 - 212 ug/dL 77    UIBC      155 - 355 ug/dL 295    Hemoglobin A1C      Normal 4.0-5.6%; PreDiabetic 5.7-6.4%; Diabetic >=6.5%; Glycemic control for adults with diabetes <7.0% % 5.4    eAG, EST AVG Glucose      mg/dl 108    Vit D, 25-Hydroxy      30.0 - 100.0 ng/mL 21.2 (L)    Ferritin      11 - 307 ng/mL 13       Legend:  (L) Low    Discussed with the patient and all questioned fully answered. She will call me if any problems arise.    Follow-up appointment in 6 months.     Counseled patient on diagnostic results, prognosis, risk and benefit of treatment options, instruction for management, importance of treatment compliance, Risk  factor reduction and impressions    Sarkis Subramanian PA-C

## 2024-01-29 NOTE — ASSESSMENT & PLAN NOTE
Refer to dietician for MNT and will also refer to [facility] Employee health coaching. Discussed importance of Lifestyle modification and weight loss. Suggest stopping sweetened coffee drinks and drink coffee at home.  If she has not had success we can discuss pharmacologic therapy at the next visit, but she would like to avoid medication if possible.

## 2024-01-30 LAB
C TRACH DNA SPEC QL NAA+PROBE: NEGATIVE
HPV HR 12 DNA CVX QL NAA+PROBE: NEGATIVE
HPV16 DNA CVX QL NAA+PROBE: NEGATIVE
HPV18 DNA CVX QL NAA+PROBE: NEGATIVE
N GONORRHOEA DNA SPEC QL NAA+PROBE: NEGATIVE

## 2024-01-31 DIAGNOSIS — Z00.6 ENCOUNTER FOR EXAMINATION FOR NORMAL COMPARISON OR CONTROL IN CLINICAL RESEARCH PROGRAM: ICD-10-CM

## 2024-02-01 LAB
LAB AP GYN PRIMARY INTERPRETATION: NORMAL
Lab: NORMAL

## 2024-02-09 ENCOUNTER — NURSE TRIAGE (OUTPATIENT)
Age: 39
End: 2024-02-09

## 2024-02-09 NOTE — TELEPHONE ENCOUNTER
"Regarding: Leg swelling, twisted knee due to getting in and out of car multiple times  ----- Message from Halley Joy sent at 2/9/2024 11:33 AM EST -----  Pt sent [employer] message stating that she \"twisted my leg wrong. Now I am having pain behind my knee and left side below my knee. It is slightly swollen too. I'm thinking muscle related.\" Please triage for leg swelling.    "

## 2024-02-09 NOTE — TELEPHONE ENCOUNTER
"Patient reports patient reports that she was getting in/out of the care repetitively and seemed to twist her knee. Patient reports mild to moderate pain when she is bearing weight on her LE. Patient denies any trauma. Patient was instructed on home care advice with understanding verbalized patient was advised to call the office back if she does not have some improvement in the next week.   Reason for Disposition   Knee pain    Answer Assessment - Initial Assessment Questions  1. LOCATION and RADIATION: \"Where is the pain located?\"       Above and below knee, outer aspects  2. QUALITY: \"What does the pain feel like?\"  (e.g., sharp, dull, aching, burning)      Pulling, stretching, sharp pain  3. SEVERITY: \"How bad is the pain?\" \"What does it keep you from doing?\"   (Scale 1-10; or mild, moderate, severe)    -  MILD (1-3): doesn't interfere with normal activities     -  MODERATE (4-7): interferes with normal activities (e.g., work or school) or awakens from sleep, limping     -  SEVERE (8-10): excruciating pain, unable to do any normal activities, unable to walk      Mild to moderate  4. ONSET: \"When did the pain start?\" \"Does it come and go, or is it there all the time?\"      2 days  5. RECURRENT: \"Have you had this pain before?\" If Yes, ask: \"When, and what happened then?\"      No  6. SETTING: \"Has there been any recent work, exercise or other activity that involved that part of the body?\"       N/A  7. AGGRAVATING FACTORS: \"What makes the knee pain worse?\" (e.g., walking, climbing stairs, running)      Weight bearing  8. ASSOCIATED SYMPTOMS: \"Is there any swelling or redness of the knee?\"      Mild swelling  9. OTHER SYMPTOMS: \"Do you have any other symptoms?\" (e.g., chest pain, difficulty breathing, fever, calf pain)      denies  10. PREGNANCY: \"Is there any chance you are pregnant?\" \"When was your last menstrual period?\"        N/A    Protocols used: Knee Pain-ADULT-[place]    "

## 2024-02-26 ENCOUNTER — OFFICE VISIT (OUTPATIENT)
Dept: FAMILY MEDICINE CLINIC | Facility: CLINIC | Age: 39
End: 2024-02-26
Payer: COMMERCIAL

## 2024-02-26 VITALS
HEART RATE: 70 BPM | DIASTOLIC BLOOD PRESSURE: 72 MMHG | HEIGHT: 65 IN | WEIGHT: 271 LBS | TEMPERATURE: 97.3 F | SYSTOLIC BLOOD PRESSURE: 116 MMHG | BODY MASS INDEX: 45.15 KG/M2 | OXYGEN SATURATION: 98 %

## 2024-02-26 DIAGNOSIS — Z00.00 HEALTH MAINTENANCE EXAMINATION: Primary | ICD-10-CM

## 2024-02-26 DIAGNOSIS — E61.1 IRON DEFICIENCY: ICD-10-CM

## 2024-02-26 DIAGNOSIS — F41.1 GENERALIZED ANXIETY DISORDER: ICD-10-CM

## 2024-02-26 DIAGNOSIS — E06.3 HYPOTHYROIDISM DUE TO HASHIMOTO'S THYROIDITIS: ICD-10-CM

## 2024-02-26 DIAGNOSIS — E03.8 HYPOTHYROIDISM DUE TO HASHIMOTO'S THYROIDITIS: ICD-10-CM

## 2024-02-26 DIAGNOSIS — E55.9 VITAMIN D DEFICIENCY: ICD-10-CM

## 2024-02-26 DIAGNOSIS — E66.01 CLASS 3 SEVERE OBESITY WITHOUT SERIOUS COMORBIDITY WITH BODY MASS INDEX (BMI) OF 45.0 TO 49.9 IN ADULT, UNSPECIFIED OBESITY TYPE (HCC): ICD-10-CM

## 2024-02-26 DIAGNOSIS — J02.9 SORE THROAT: ICD-10-CM

## 2024-02-26 PROCEDURE — 99395 PREV VISIT EST AGE 18-39: CPT | Performed by: NURSE PRACTITIONER

## 2024-02-26 PROCEDURE — 99214 OFFICE O/P EST MOD 30 MIN: CPT | Performed by: NURSE PRACTITIONER

## 2024-02-26 RX ORDER — PREDNISONE 20 MG/1
40 TABLET ORAL DAILY
Qty: 6 TABLET | Refills: 0 | Status: SHIPPED | OUTPATIENT
Start: 2024-02-26 | End: 2024-02-26 | Stop reason: SDUPTHER

## 2024-02-26 RX ORDER — PREDNISONE 20 MG/1
40 TABLET ORAL DAILY
Qty: 6 TABLET | Refills: 0 | Status: SHIPPED | OUTPATIENT
Start: 2024-02-26 | End: 2024-02-29

## 2024-02-26 NOTE — PROGRESS NOTES
ADULT ANNUAL PHYSICAL  [facility] PRIMARY CARE    NAME: Mary Whitfield  AGE: 38 y.o. SEX: female  : 1985     DATE: 2024     Assessment and Plan:     Problem List Items Addressed This Visit          Endocrine    Hypothyroidism due to Hashimoto's thyroiditis - Primary     Continue to follow with endocrinology for management.             Other    Generalized anxiety disorder     Stopped Lexapro 3 months ago. Doing well off of it. Has ativan to use PRN rarely. Overall symptoms stable.          Vitamin D deficiency     Encouraged to re-start supplementation. Not at goal.          Iron deficiency     Labs stable.          Class 3 severe obesity with body mass index (BMI) of 45.0 to 49.9 in adult ([facility])     Encouraged healthy lifestyle.   Wt Readings from Last 3 Encounters:   24 123 kg (271 lb)   24 126 kg (278 lb 6.4 oz)   24 126 kg (277 lb)               Sore throat:  Reviewed differentials. No signs of bacterial infection. No lymphadenopathy. Start prednisone, this is the steroid. 40mg daily for 3 days. Take with food. It's better to take it earlier in the day than later as it could keep you up at night. Do not mix with NSAIDs such as aleve, ibuprofen, advil, motrin. ENT if no improvement.     Immunizations and preventive care screenings were discussed with patient today. Appropriate education was printed on patient's after visit summary.    Counseling:  Dental Health: discussed importance of regular tooth brushing, flossing, and dental visits.  Exercise: the importance of regular exercise/physical activity was discussed. Recommend exercise 3-5 times per week for at least 30 minutes.          Return in about 1 year (around 2025) for Annual physical.     Chief Complaint:     Chief Complaint   Patient presents with    Physical Exam    Cold Like Symptoms     Has a had a sore throat for over 1 week       History of Present Illness:      Adult Annual Physical   Patient here for a comprehensive physical exam. The patient reports problems - sore throat .  Had viral/ cold symptoms starting . All resolved except sore throat.   Labs done to be reviewed.     Diet and Physical Activity  Diet/Nutrition: well balanced diet.   Exercise: no formal exercise.      Depression Screening  PHQ-2/9 Depression Screening           General Health  Sleep: sleeps well.   Vision: no vision problems and most recent eye exam >1 year ago.   Dental: regular dental visits and brushes teeth twice daily.       /GYN Health  Follows with gynecology? yes   Pap done 2024   STD screening done and negative    Review of Systems:     Review of Systems   Constitutional:  Negative for chills and fever.   HENT:  Positive for sore throat.    Eyes:  Negative for discharge.   Respiratory:  Negative for shortness of breath.    Cardiovascular:  Negative for chest pain.   Gastrointestinal:  Negative for constipation and diarrhea.   Genitourinary:  Negative for difficulty urinating.   Musculoskeletal:  Negative for joint swelling.   Skin:  Negative for rash.   Neurological:  Negative for headaches.   Hematological:  Negative for adenopathy.   Psychiatric/Behavioral:  The patient is not nervous/anxious.       Past Medical History:     Past Medical History:   Diagnosis Date    Hypothyroidism     Obesity       Past Surgical History:     Past Surgical History:   Procedure Laterality Date    US GUIDED THYROID BIOPSY  2018      Social History:     Social History     Socioeconomic History    Marital status: Single     Spouse name: None    Number of children: None    Years of education: None    Highest education level: None   Occupational History    Occupation: switchboard at [employer]   Tobacco Use    Smoking status: Former     Current packs/day: 0.00     Types: Cigarettes     Quit date: 2015     Years since quittin.4    Smokeless tobacco: Never   Vaping Use    Vaping status:  "Never Used   Substance and Sexual Activity    Alcohol use: Yes     Alcohol/week: 0.0 standard drinks of alcohol     Comment: socially    Drug use: No    Sexual activity: Yes     Partners: Male     Birth control/protection: Condom Male   Other Topics Concern    None   Social History Narrative    None     Social Determinants of Health     Financial Resource Strain: Not on file   Food Insecurity: Not on file   Transportation Needs: Not on file   Physical Activity: Not on file   Stress: Not on file   Social Connections: Not on file   Intimate Partner Violence: Not on file   Housing Stability: Not on file      Family History:     Family History   Problem Relation Age of Onset    No Known Problems Mother     Heart attack Father 45    Hypertension Father     Hyperlipidemia Father     Heart disease Father     No Known Problems Sister     No Known Problems Brother     No Known Problems Daughter     No Known Problems Son     No Known Problems Maternal Grandmother     No Known Problems Daughter     Breast cancer Neg Hx     Colon cancer Neg Hx     Ovarian cancer Neg Hx       Current Medications:     Current Outpatient Medications   Medication Sig Dispense Refill    Cholecalciferol (Vitamin D3) 125 MCG (5000 UT) CAPS 1 cap daily      ferrous sulfate 324 (65 Fe) mg 1 tab daily at dinner      levothyroxine 100 mcg tablet Take 1 tablet (100 mcg total) by mouth daily 90 tablet 1    LORazepam (Ativan) 0.5 mg tablet Take 1 tablet (0.5 mg total) by mouth daily as needed for anxiety 30 tablet 0     No current facility-administered medications for this visit.      Allergies:     No Known Allergies   Physical Exam:     /72 (BP Location: Left arm, Patient Position: Sitting, Cuff Size: Large)   Pulse 70   Temp (!) 97.3 °F (36.3 °C) (Temporal)   Ht 5' 5\" (1.651 m)   Wt 123 kg (271 lb)   LMP 01/14/2024 (Exact Date)   SpO2 98%   BMI 45.10 kg/m²     Physical Exam  Vitals and nursing note reviewed.   Constitutional:       General: " She is not in acute distress.     Appearance: Normal appearance. She is obese. She is not ill-appearing, toxic-appearing or diaphoretic.   HENT:      Head: Normocephalic and atraumatic.      Right Ear: Tympanic membrane, ear canal and external ear normal. There is no impacted cerumen.      Left Ear: Tympanic membrane, ear canal and external ear normal. There is no impacted cerumen.      Nose: Nose normal.      Mouth/Throat:      Mouth: Mucous membranes are moist.      Pharynx: Oropharynx is clear. Posterior oropharyngeal erythema present. No oropharyngeal exudate.      Tonsils: No tonsillar exudate. 1+ on the right. 2+ on the left.      Comments: No exduate, no drainage, no associated lymphadenopathy, no tonsil stones noted on left side. Small tonsil stone on right side.  Eyes:      General: Lids are normal. No scleral icterus.        Right eye: No discharge.         Left eye: No discharge.      Extraocular Movements: Extraocular movements intact.      Conjunctiva/sclera: Conjunctivae normal.      Pupils: Pupils are equal, round, and reactive to light.   Cardiovascular:      Rate and Rhythm: Normal rate and regular rhythm. No extrasystoles are present.     Heart sounds: S1 normal and S2 normal. No murmur heard.  Pulmonary:      Effort: Pulmonary effort is normal. No respiratory distress.      Breath sounds: Normal breath sounds. No stridor or decreased air movement. No wheezing or rhonchi.   Abdominal:      General: Bowel sounds are normal.      Palpations: Abdomen is soft.      Tenderness: There is no abdominal tenderness.   Musculoskeletal:         General: Normal range of motion.      Cervical back: Normal range of motion and neck supple.   Skin:     General: Skin is warm and dry.   Neurological:      General: No focal deficit present.      Mental Status: She is alert and oriented to person, place, and time. Mental status is at baseline.      Cranial Nerves: No cranial nerve deficit.      Sensory: No sensory  deficit.      Motor: No weakness.      Coordination: Coordination normal.      Gait: Gait normal.   Psychiatric:         Attention and Perception: Attention and perception normal.         Mood and Affect: Mood and affect normal.         Speech: Speech normal.         Behavior: Behavior is cooperative.         Cognition and Memory: Cognition normal.         Judgment: Judgment normal.          FRIDA Xiao   [facility]

## 2024-02-26 NOTE — PATIENT INSTRUCTIONS
Restart vitamin D.     Start prednisone, this is the steroid. 40mg daily for 3 days. Take with food. It's better to take it earlier in the day than later as it could keep you up at night. Do not mix with NSAIDs such as aleve, ibuprofen, advil, motrin.     Stay well hydrated. Continue with salt water gargles.     Please call the office if you are experiencing any worsening of symptoms or no symptom improvement.

## 2024-02-26 NOTE — ASSESSMENT & PLAN NOTE
Encouraged healthy lifestyle.   Wt Readings from Last 3 Encounters:   02/26/24 123 kg (271 lb)   01/29/24 126 kg (278 lb 6.4 oz)   01/29/24 126 kg (277 lb)

## 2024-02-26 NOTE — ASSESSMENT & PLAN NOTE
Stopped Lexapro 3 months ago. Doing well off of it. Has ativan to use PRN rarely. Overall symptoms stable.

## 2024-03-11 ENCOUNTER — APPOINTMENT (OUTPATIENT)
Dept: LAB | Age: 39
End: 2024-03-11
Payer: COMMERCIAL

## 2024-03-11 DIAGNOSIS — Z00.6 ENCOUNTER FOR EXAMINATION FOR NORMAL COMPARISON OR CONTROL IN CLINICAL RESEARCH PROGRAM: ICD-10-CM

## 2024-03-11 DIAGNOSIS — E06.3 HYPOTHYROIDISM DUE TO HASHIMOTO'S THYROIDITIS: ICD-10-CM

## 2024-03-11 DIAGNOSIS — E03.8 HYPOTHYROIDISM DUE TO HASHIMOTO'S THYROIDITIS: ICD-10-CM

## 2024-03-11 LAB
T4 FREE SERPL-MCNC: 0.97 NG/DL (ref 0.61–1.12)
TSH SERPL DL<=0.05 MIU/L-ACNC: 3.24 UIU/ML (ref 0.45–4.5)

## 2024-03-11 PROCEDURE — 84443 ASSAY THYROID STIM HORMONE: CPT

## 2024-03-11 PROCEDURE — 36415 COLL VENOUS BLD VENIPUNCTURE: CPT

## 2024-03-11 PROCEDURE — 84439 ASSAY OF FREE THYROXINE: CPT

## 2024-04-09 DIAGNOSIS — E06.3 HYPOTHYROIDISM DUE TO HASHIMOTO'S THYROIDITIS: Primary | ICD-10-CM

## 2024-04-09 DIAGNOSIS — E03.8 HYPOTHYROIDISM DUE TO HASHIMOTO'S THYROIDITIS: Primary | ICD-10-CM

## 2024-04-09 RX ORDER — LEVOTHYROXINE SODIUM 112 UG/1
112 TABLET ORAL DAILY
Qty: 90 TABLET | Refills: 1 | Status: SHIPPED | OUTPATIENT
Start: 2024-04-09 | End: 2024-04-09 | Stop reason: SDUPTHER

## 2024-04-09 RX ORDER — LEVOTHYROXINE SODIUM 112 UG/1
112 TABLET ORAL DAILY
Qty: 90 TABLET | Refills: 1 | Status: SHIPPED | OUTPATIENT
Start: 2024-04-09

## 2024-04-23 ENCOUNTER — TELEPHONE (OUTPATIENT)
Dept: FAMILY MEDICINE CLINIC | Facility: CLINIC | Age: 39
End: 2024-04-23

## 2024-04-24 ENCOUNTER — TELEPHONE (OUTPATIENT)
Dept: BEHAVIORAL/MENTAL HEALTH CLINIC | Facility: CLINIC | Age: 39
End: 2024-04-24

## 2024-05-02 LAB
APOB+LDLR+PCSK9 GENE MUT ANL BLD/T: NOT DETECTED
BRCA1+BRCA2 DEL+DUP + FULL MUT ANL BLD/T: NOT DETECTED
MLH1+MSH2+MSH6+PMS2 GN DEL+DUP+FUL M: NOT DETECTED

## 2024-07-12 ENCOUNTER — APPOINTMENT (OUTPATIENT)
Dept: LAB | Facility: CLINIC | Age: 39
End: 2024-07-12
Payer: COMMERCIAL

## 2024-07-12 DIAGNOSIS — E06.3 HYPOTHYROIDISM DUE TO HASHIMOTO'S THYROIDITIS: ICD-10-CM

## 2024-07-12 DIAGNOSIS — E61.1 IRON DEFICIENCY: ICD-10-CM

## 2024-07-12 DIAGNOSIS — E55.9 VITAMIN D DEFICIENCY: ICD-10-CM

## 2024-07-12 DIAGNOSIS — E03.8 HYPOTHYROIDISM DUE TO HASHIMOTO'S THYROIDITIS: ICD-10-CM

## 2024-07-12 LAB
25(OH)D3 SERPL-MCNC: 39.2 NG/ML (ref 30–100)
FERRITIN SERPL-MCNC: 17 NG/ML (ref 11–307)
IRON SATN MFR SERPL: 10 % (ref 15–50)
IRON SERPL-MCNC: 37 UG/DL (ref 50–212)
T4 FREE SERPL-MCNC: 0.94 NG/DL (ref 0.61–1.12)
TIBC SERPL-MCNC: 366 UG/DL (ref 250–450)
TSH SERPL DL<=0.05 MIU/L-ACNC: 4.62 UIU/ML (ref 0.45–4.5)
UIBC SERPL-MCNC: 329 UG/DL (ref 155–355)

## 2024-07-12 PROCEDURE — 83540 ASSAY OF IRON: CPT

## 2024-07-12 PROCEDURE — 82306 VITAMIN D 25 HYDROXY: CPT

## 2024-07-12 PROCEDURE — 83550 IRON BINDING TEST: CPT

## 2024-07-12 PROCEDURE — 82728 ASSAY OF FERRITIN: CPT

## 2024-07-12 PROCEDURE — 36415 COLL VENOUS BLD VENIPUNCTURE: CPT

## 2024-07-12 PROCEDURE — 84439 ASSAY OF FREE THYROXINE: CPT

## 2024-07-12 PROCEDURE — 84443 ASSAY THYROID STIM HORMONE: CPT

## 2024-07-15 ENCOUNTER — TELEPHONE (OUTPATIENT)
Age: 39
End: 2024-07-15

## 2024-07-15 NOTE — TELEPHONE ENCOUNTER
Patient called stating that she just had her labs done and she wants her provider to review them.     Notes she out of her Levothyroxine but she knows because of her results she knows that her provider will increase the dosage.

## 2024-07-16 ENCOUNTER — PATIENT MESSAGE (OUTPATIENT)
Dept: FAMILY MEDICINE CLINIC | Facility: CLINIC | Age: 39
End: 2024-07-16

## 2024-07-16 DIAGNOSIS — E61.1 IRON DEFICIENCY: Primary | ICD-10-CM

## 2024-07-16 DIAGNOSIS — E03.8 HYPOTHYROIDISM DUE TO HASHIMOTO'S THYROIDITIS: Primary | ICD-10-CM

## 2024-07-16 DIAGNOSIS — E06.3 HYPOTHYROIDISM DUE TO HASHIMOTO'S THYROIDITIS: Primary | ICD-10-CM

## 2024-07-16 NOTE — TELEPHONE ENCOUNTER
Patient calling in regards to the [employer] message. She states she has been taking levothyroxine 112mcg daily since 4/9/24. If dose will increase from 112 patient request we please send script for 90 day supply to [facility] in [place].  Please advise

## 2024-07-16 NOTE — TELEPHONE ENCOUNTER
Phone call from patient still awaiting labs to be reviewed for medication adjustment. Patient would like a call back with results.

## 2024-07-16 NOTE — TELEPHONE ENCOUNTER
See results note    TSH high  IF she has been taking levothyroxine 100mcg daily and properly with no missed doses will increase to 112mcg daily  I will send RX to pharmacy and repeat lab testing in 6 weeks.   Please let me know what pharmacy she wants and if she wants 30 or 90 day RX.     She should follow up with her PCP regarding iron deficiency   Remember iron should be taken 3-4 hours after levothyroxine to avoid problems with absorption.

## 2024-07-17 ENCOUNTER — TELEPHONE (OUTPATIENT)
Dept: ENDOCRINOLOGY | Facility: CLINIC | Age: 39
End: 2024-07-17

## 2024-07-17 DIAGNOSIS — E06.3 HYPOTHYROIDISM DUE TO HASHIMOTO'S THYROIDITIS: ICD-10-CM

## 2024-07-17 DIAGNOSIS — E03.8 HYPOTHYROIDISM DUE TO HASHIMOTO'S THYROIDITIS: ICD-10-CM

## 2024-07-17 DIAGNOSIS — E03.8 HYPOTHYROIDISM DUE TO HASHIMOTO'S THYROIDITIS: Primary | ICD-10-CM

## 2024-07-17 DIAGNOSIS — E06.3 HYPOTHYROIDISM DUE TO HASHIMOTO'S THYROIDITIS: Primary | ICD-10-CM

## 2024-07-17 RX ORDER — LEVOTHYROXINE SODIUM 0.12 MG/1
125 TABLET ORAL DAILY
Qty: 90 TABLET | Refills: 1 | Status: SHIPPED | OUTPATIENT
Start: 2024-07-17

## 2024-07-17 RX ORDER — LEVOTHYROXINE SODIUM 0.12 MG/1
125 TABLET ORAL DAILY
Qty: 90 TABLET | Refills: 1 | Status: SHIPPED | OUTPATIENT
Start: 2024-07-17 | End: 2024-07-17 | Stop reason: SDUPTHER

## 2024-07-17 NOTE — TELEPHONE ENCOUNTER
RX for levothyroxine 125mcg sent to [facility]  Repeat labs ordered to be done in 6 weeks  Please follow up with PCP regarding iron deficiency and symptoms including anxiety    Keysha Ga Castillo  7/16/2024  4:50 PM EDT       Patient aware. She said she's been on the 112 dose since April. She is very fatigued, joints hurt. She said her anxiety is also bad. She said she has not missed any doses and is taking it correctly.    Salma Subramanian PA-C  7/16/2024  1:07 PM EDT       TSH high  IF she has been taking levothyroxine 100mcg daily and properly with no missed doses will increase to 112mcg daily  I will send RX to pharmacy and repeat lab testing in 6 weeks.  Please let me know what pharmacy she wants and if she wants 30 or 90 day RX.     She should follow up with her PCP regarding iron deficiency  Remember iron should be taken 3-4 hours after levothyroxine to avoid problems with absorption.

## 2024-07-17 NOTE — TELEPHONE ENCOUNTER
THIS IS NOT A DUPLICATE     Patient called to confirm her mychart request went through.  medication was sent to the wrong pharmacy yesterday     Patient is out of medication

## 2024-07-19 ENCOUNTER — APPOINTMENT (OUTPATIENT)
Dept: LAB | Facility: CLINIC | Age: 39
End: 2024-07-19
Payer: COMMERCIAL

## 2024-07-19 DIAGNOSIS — E61.1 IRON DEFICIENCY: ICD-10-CM

## 2024-07-19 LAB
BASOPHILS # BLD AUTO: 0.06 THOUSANDS/ÂΜL (ref 0–0.1)
BASOPHILS NFR BLD AUTO: 1 % (ref 0–1)
EOSINOPHIL # BLD AUTO: 0.47 THOUSAND/ÂΜL (ref 0–0.61)
EOSINOPHIL NFR BLD AUTO: 6 % (ref 0–6)
ERYTHROCYTE [DISTWIDTH] IN BLOOD BY AUTOMATED COUNT: 13.2 % (ref 11.6–15.1)
HCT VFR BLD AUTO: 39.3 % (ref 34.8–46.1)
HGB BLD-MCNC: 12.7 G/DL (ref 11.5–15.4)
IMM GRANULOCYTES # BLD AUTO: 0.03 THOUSAND/UL (ref 0–0.2)
IMM GRANULOCYTES NFR BLD AUTO: 0 % (ref 0–2)
LYMPHOCYTES # BLD AUTO: 2.32 THOUSANDS/ÂΜL (ref 0.6–4.47)
LYMPHOCYTES NFR BLD AUTO: 27 % (ref 14–44)
MCH RBC QN AUTO: 27.7 PG (ref 26.8–34.3)
MCHC RBC AUTO-ENTMCNC: 32.3 G/DL (ref 31.4–37.4)
MCV RBC AUTO: 86 FL (ref 82–98)
MONOCYTES # BLD AUTO: 0.69 THOUSAND/ÂΜL (ref 0.17–1.22)
MONOCYTES NFR BLD AUTO: 8 % (ref 4–12)
NEUTROPHILS # BLD AUTO: 4.9 THOUSANDS/ÂΜL (ref 1.85–7.62)
NEUTS SEG NFR BLD AUTO: 58 % (ref 43–75)
NRBC BLD AUTO-RTO: 0 /100 WBCS
PLATELET # BLD AUTO: 337 THOUSANDS/UL (ref 149–390)
PMV BLD AUTO: 10.7 FL (ref 8.9–12.7)
RBC # BLD AUTO: 4.58 MILLION/UL (ref 3.81–5.12)
WBC # BLD AUTO: 8.47 THOUSAND/UL (ref 4.31–10.16)

## 2024-07-19 PROCEDURE — 36415 COLL VENOUS BLD VENIPUNCTURE: CPT

## 2024-07-19 PROCEDURE — 85025 COMPLETE CBC W/AUTO DIFF WBC: CPT

## 2024-08-19 ENCOUNTER — TELEPHONE (OUTPATIENT)
Age: 39
End: 2024-08-19

## 2024-08-19 NOTE — TELEPHONE ENCOUNTER
Pt returned call, states that she is interested in services for therapy (CBT), and would like to be added to proper wait list.

## 2024-08-19 NOTE — TELEPHONE ENCOUNTER
----- Message from Herlinda ASHRAF sent at 8/19/2024  9:03 AM EDT -----  Writer reached out to patient in April to schedule with Yas Mclaughlin who is no longer with [facility].  Patient called today to try and schedule.  Writer informed patient therapist is no longer with [employer] and would forward request to Intake.  Patient is a [facility] employee.  I do not see a referral in chart.  Please reach out to patient.

## 2024-08-19 NOTE — TELEPHONE ENCOUNTER
Contacted pt in regards to inbasket inquiry regarding talk therapy services. Pt answered the phone and asked writer to hold for a minute. Writer waited for 2 minutes with no answer on other line and disconnected from call.     If pt calls back, please add to appropriate wait list.

## 2024-08-27 ENCOUNTER — LAB (OUTPATIENT)
Dept: LAB | Age: 39
End: 2024-08-27
Payer: COMMERCIAL

## 2024-08-27 DIAGNOSIS — E06.3 HYPOTHYROIDISM DUE TO HASHIMOTO'S THYROIDITIS: ICD-10-CM

## 2024-08-27 DIAGNOSIS — E03.8 HYPOTHYROIDISM DUE TO HASHIMOTO'S THYROIDITIS: ICD-10-CM

## 2024-08-27 LAB
T4 FREE SERPL-MCNC: 0.91 NG/DL (ref 0.61–1.12)
TSH SERPL DL<=0.05 MIU/L-ACNC: 1.25 UIU/ML (ref 0.45–4.5)

## 2024-08-27 PROCEDURE — 36415 COLL VENOUS BLD VENIPUNCTURE: CPT

## 2024-08-27 PROCEDURE — 84439 ASSAY OF FREE THYROXINE: CPT

## 2024-08-27 PROCEDURE — 84443 ASSAY THYROID STIM HORMONE: CPT

## 2024-08-28 ENCOUNTER — TELEPHONE (OUTPATIENT)
Dept: ENDOCRINOLOGY | Facility: CLINIC | Age: 39
End: 2024-08-28

## 2024-08-28 NOTE — TELEPHONE ENCOUNTER
----- Message from Yas Ling MD sent at 8/27/2024  9:49 PM EDT -----  Please call the patient regarding labs -TSH is optimal-continue levothyroxine at current dose

## 2024-08-28 NOTE — TELEPHONE ENCOUNTER
I called and left a detailed message for the patient concerning her most recent test result.  She is to call the office if she has any other questions concerning.

## 2024-08-30 ENCOUNTER — TELEPHONE (OUTPATIENT)
Dept: OTHER | Facility: OTHER | Age: 39
End: 2024-08-30

## 2024-08-30 NOTE — TELEPHONE ENCOUNTER
Patient contacted after hours to schedule an appointment for her FMLA. She needs the paperwork completed before 09/13/2024. Please give her a call back at [phone]

## 2024-09-03 NOTE — TELEPHONE ENCOUNTER
Called and left message for patient. If FMLA is for same as past, we are ok with completing FMLA paperwork without appt, if FMLA is for different reason, or needs adjusted appointment would be needed. Provided fax number.

## 2024-09-17 ENCOUNTER — TELEPHONE (OUTPATIENT)
Dept: FAMILY MEDICINE CLINIC | Facility: CLINIC | Age: 39
End: 2024-09-17

## 2024-09-17 NOTE — TELEPHONE ENCOUNTER
Pt called to say that she had sent a message thru [employer] about a section of her [facility] paperwork that needs revision by Vida. Wanted to know if Vida had gotten to it yet, as she doesn't want to risk anything with work. I informed the pt that Vida is in with pts but that I would make her aware.

## 2024-09-17 NOTE — TELEPHONE ENCOUNTER
There were 2 other encounters that were sent to me today regarding this.  Form was already completed earlier today and updated it was put upfront.  From what I can see the patient was message through [employer] from the staff to notify her.  Please follow-up with patient and review u.sitt message.  Form is completed/updated.

## 2024-09-24 ENCOUNTER — OFFICE VISIT (OUTPATIENT)
Dept: FAMILY MEDICINE CLINIC | Facility: CLINIC | Age: 39
End: 2024-09-24
Payer: COMMERCIAL

## 2024-09-24 VITALS
BODY MASS INDEX: 47.68 KG/M2 | HEIGHT: 65 IN | DIASTOLIC BLOOD PRESSURE: 78 MMHG | TEMPERATURE: 97.9 F | HEART RATE: 63 BPM | WEIGHT: 286.2 LBS | SYSTOLIC BLOOD PRESSURE: 120 MMHG | OXYGEN SATURATION: 99 %

## 2024-09-24 DIAGNOSIS — F41.1 GENERALIZED ANXIETY DISORDER: Primary | ICD-10-CM

## 2024-09-24 PROCEDURE — 99214 OFFICE O/P EST MOD 30 MIN: CPT | Performed by: NURSE PRACTITIONER

## 2024-09-24 RX ORDER — ESCITALOPRAM OXALATE 5 MG/1
5 TABLET ORAL DAILY
Qty: 30 TABLET | Refills: 1 | Status: SHIPPED | OUTPATIENT
Start: 2024-09-24

## 2024-09-24 NOTE — PROGRESS NOTES
"Ambulatory Visit  Name: Mary Whitfield      : 1985      MRN: [id number]  Encounter Provider: FRIDA Xiao  Encounter Date: 2024   Encounter department: [facility] PRIMARY CARE    Assessment & Plan  Generalized anxiety disorder  Start lexapro 5 mg daily.   Recheck 1 month.   Continue with therapy.   Please call the office if you are experiencing any worsening of symptoms or no symptom improvement.   Orders:    escitalopram (LEXAPRO) 5 mg tablet; Take 1 tablet (5 mg total) by mouth daily       History of Present Illness     Here to discuss anxiety.   Was on Lexapro- was initially helpful.   Currently doing therapy which is new- this has been helpful but symptoms not well controlled. Wants to discuss re-starting Lexapro.      Anxiety  Symptoms include decreased concentration and nervous/anxious behavior. Patient reports no chest pain or shortness of breath.             Review of Systems   Constitutional:  Negative for chills and fever.   Eyes:  Negative for discharge.   Respiratory:  Negative for shortness of breath.    Cardiovascular:  Negative for chest pain.   Gastrointestinal:  Negative for constipation and diarrhea.   Genitourinary:  Negative for difficulty urinating.   Musculoskeletal:  Negative for joint swelling.   Skin:  Negative for rash.   Neurological:  Negative for headaches.   Hematological:  Negative for adenopathy.   Psychiatric/Behavioral:  Positive for decreased concentration. The patient is nervous/anxious.            Objective     /78   Pulse 63   Temp 97.9 °F (36.6 °C) (Temporal)   Ht 5' 5\" (1.651 m)   Wt 130 kg (286 lb 3.2 oz)   SpO2 99%   BMI 47.63 kg/m²     Physical Exam  Vitals and nursing note reviewed.   Constitutional:       General: She is not in acute distress.     Appearance: Normal appearance. She is well-developed. She is obese. She is not diaphoretic.   HENT:      Head: Normocephalic and atraumatic.      Right Ear: External ear " normal.      Left Ear: External ear normal.   Eyes:      General: Lids are normal.         Right eye: No discharge.         Left eye: No discharge.      Conjunctiva/sclera: Conjunctivae normal.   Pulmonary:      Effort: Pulmonary effort is normal. No respiratory distress.   Musculoskeletal:         General: No deformity.   Skin:     General: Skin is warm and dry.   Neurological:      General: No focal deficit present.      Mental Status: She is alert and oriented to person, place, and time.   Psychiatric:         Mood and Affect: Mood is anxious.         Speech: Speech normal.         Behavior: Behavior normal.         Thought Content: Thought content normal. Thought content does not include homicidal or suicidal ideation. Thought content does not include homicidal or suicidal plan.         Judgment: Judgment normal.

## 2024-09-24 NOTE — ASSESSMENT & PLAN NOTE
Start lexapro 5 mg daily.   Recheck 1 month.   Continue with therapy.   Please call the office if you are experiencing any worsening of symptoms or no symptom improvement.   Orders:    escitalopram (LEXAPRO) 5 mg tablet; Take 1 tablet (5 mg total) by mouth daily

## 2024-09-24 NOTE — PATIENT INSTRUCTIONS
Start lexapro 5 mg daily.     Recheck 1 month.     Continue with therapy.     Please call the office if you are experiencing any worsening of symptoms or no symptom improvement.

## 2024-10-14 DIAGNOSIS — E06.3 HYPOTHYROIDISM DUE TO HASHIMOTO'S THYROIDITIS: ICD-10-CM

## 2024-10-15 RX ORDER — LEVOTHYROXINE SODIUM 125 UG/1
125 TABLET ORAL DAILY
Qty: 90 TABLET | Refills: 1 | Status: SHIPPED | OUTPATIENT
Start: 2024-10-15

## 2024-11-07 ENCOUNTER — OFFICE VISIT (OUTPATIENT)
Dept: FAMILY MEDICINE CLINIC | Facility: CLINIC | Age: 39
End: 2024-11-07
Payer: COMMERCIAL

## 2024-11-07 VITALS
DIASTOLIC BLOOD PRESSURE: 70 MMHG | BODY MASS INDEX: 46.98 KG/M2 | WEIGHT: 282 LBS | SYSTOLIC BLOOD PRESSURE: 118 MMHG | HEART RATE: 65 BPM | OXYGEN SATURATION: 96 % | HEIGHT: 65 IN | TEMPERATURE: 98 F | RESPIRATION RATE: 16 BRPM

## 2024-11-07 DIAGNOSIS — M79.672 FOOT PAIN, LEFT: Primary | ICD-10-CM

## 2024-11-07 DIAGNOSIS — G89.29 CHRONIC MIDLINE LOW BACK PAIN WITHOUT SCIATICA: ICD-10-CM

## 2024-11-07 DIAGNOSIS — F41.1 GENERALIZED ANXIETY DISORDER: ICD-10-CM

## 2024-11-07 DIAGNOSIS — M54.50 CHRONIC MIDLINE LOW BACK PAIN WITHOUT SCIATICA: ICD-10-CM

## 2024-11-07 PROCEDURE — 99213 OFFICE O/P EST LOW 20 MIN: CPT | Performed by: NURSE PRACTITIONER

## 2024-11-07 NOTE — PROGRESS NOTES
"Ambulatory Visit  Name: Mary Whitfield      : 1985      MRN: [id number]  Encounter Provider: FRIDA Xiao  Encounter Date: 2024   Encounter department: [facility] PRIMARY CARE    Assessment & Plan  Foot pain, left  Complete imaging. Follow up with podiatry. Continue with stretching.   Orders:    XR foot 3+ vw left; Future    Chronic midline low back pain without sciatica  Stretching as tolerated. No acute findings on exam. Call if no improvement/worsening symptoms.          Generalized anxiety disorder  Stable off of medication. Managed with therapy.             History of Present Illness     Here for anxiety follow up.   Lexapro made her irritable/ changed her symptoms so she stopped it.   She continues with therapy.  She kept the appointment to discuss plantar fasciitis on left foot. On and off for 2 months.   Weight fluctuates- watching what she eats. She tried many different shoes.   Has spot on back x 2 weeks that hurts. This is center/middle lower back. ROM intact. No traumatic event.            Review of Systems   Constitutional:  Negative for chills and fever.   Eyes:  Negative for discharge.   Respiratory:  Negative for shortness of breath.    Cardiovascular:  Negative for chest pain.   Gastrointestinal:  Negative for constipation and diarrhea.   Genitourinary:  Negative for difficulty urinating.   Musculoskeletal:  Positive for arthralgias and back pain. Negative for joint swelling.   Skin:  Negative for rash.   Neurological:  Negative for headaches.   Hematological:  Negative for adenopathy.   Psychiatric/Behavioral:  The patient is not nervous/anxious.            Objective     /70   Pulse 65   Temp 98 °F (36.7 °C) (Temporal)   Resp 16   Ht 5' 5\" (1.651 m)   Wt 128 kg (282 lb)   SpO2 96%   BMI 46.93 kg/m²     Physical Exam  Vitals and nursing note reviewed.   Constitutional:       General: She is not in acute distress.     Appearance: Normal " appearance. She is well-developed. She is obese. She is not diaphoretic.   HENT:      Head: Normocephalic and atraumatic.      Right Ear: External ear normal.      Left Ear: External ear normal.   Eyes:      General: Lids are normal.         Right eye: No discharge.         Left eye: No discharge.      Conjunctiva/sclera: Conjunctivae normal.   Pulmonary:      Effort: Pulmonary effort is normal. No respiratory distress.   Musculoskeletal:         General: No deformity.      Cervical back: No tenderness or bony tenderness. Normal range of motion.      Thoracic back: No tenderness or bony tenderness. Normal range of motion.      Lumbar back: No tenderness or bony tenderness. Normal range of motion.   Skin:     General: Skin is warm and dry.   Neurological:      General: No focal deficit present.      Mental Status: She is alert and oriented to person, place, and time.   Psychiatric:         Speech: Speech normal.         Behavior: Behavior normal.         Thought Content: Thought content normal.         Judgment: Judgment normal.

## 2024-11-07 NOTE — PATIENT INSTRUCTIONS
Complete x ray of left foot.     Follow up with podiatry.     Stretches to be done for back pain.     Please call the office if you are experiencing any worsening of symptoms or no symptom improvement.

## 2024-11-08 ENCOUNTER — HOSPITAL ENCOUNTER (OUTPATIENT)
Dept: RADIOLOGY | Facility: HOSPITAL | Age: 39
Discharge: HOME | End: 2024-11-08
Payer: COMMERCIAL

## 2024-11-08 DIAGNOSIS — M79.672 FOOT PAIN, LEFT: ICD-10-CM

## 2024-11-08 PROCEDURE — 73630 X-RAY EXAM OF FOOT: CPT

## 2024-11-15 ENCOUNTER — OFFICE VISIT (OUTPATIENT)
Dept: PODIATRY | Facility: CLINIC | Age: 39
End: 2024-11-15
Payer: COMMERCIAL

## 2024-11-15 VITALS
SYSTOLIC BLOOD PRESSURE: 118 MMHG | WEIGHT: 282 LBS | DIASTOLIC BLOOD PRESSURE: 70 MMHG | HEIGHT: 65 IN | BODY MASS INDEX: 46.98 KG/M2

## 2024-11-15 DIAGNOSIS — M72.2 PLANTAR FASCIITIS: Primary | ICD-10-CM

## 2024-11-15 DIAGNOSIS — M79.672 LEFT FOOT PAIN: ICD-10-CM

## 2024-11-15 PROCEDURE — 99204 OFFICE O/P NEW MOD 45 MIN: CPT

## 2024-11-15 RX ORDER — MELOXICAM 7.5 MG/1
7.5 TABLET ORAL DAILY
Qty: 30 TABLET | Refills: 0 | Status: SHIPPED | OUTPATIENT
Start: 2024-11-15

## 2024-11-15 NOTE — PROGRESS NOTES
Name: Mary Whitfield      : 1985      MRN: [id number]  Encounter Provider: Vikas Conrad DPM  Encounter Date: 11/15/2024   Encounter department: [facility] PODIATRY [place]  :  Assessment & Plan  Plantar fasciitis    Orders:    P.F. Night Splint    meloxicam (Mobic) 7.5 mg tablet; Take 1 tablet (7.5 mg total) by mouth daily    Orthotics B/L    Ambulatory Referral to Physical Therapy; Future    Left foot pain    Orders:    P.F. Night Splint    meloxicam (Mobic) 7.5 mg tablet; Take 1 tablet (7.5 mg total) by mouth daily    Orthotics B/L    Ambulatory Referral to Physical Therapy; Future      -Patient was educated regarding their condition.  -Rx night splint  -Educated patient on plantar fasciitis stretching program to be performed daily  -Recommend purchase of supportive shoes with wide toe box. Recommend purchase of OTC orthosis (superfeet, powersteps, or tread labs).  -Rx meloxicam  -Rx custom orthotics to be done with [facility] physical therapy, to help with plantar fasciitis pain  -Referral to physical therapy made  -Corticosteroid injection will be considered next visit if there is no improvement in symptoms  -Patient will RTC in 6-weeks    -X-ray from 2024 of the left foot was interpreted by myself: No acute osseous abnormalities noted, plantar calcaneal bone spur noted    History of Present Illness     HPI  Mary Whitfield is a 38 y.o. female who presents today with pain of the left heel .  The pain is described as sharp. This pain is located at left heel and goes down to the arch. They has had this for 2 months . This was was first noted with walking a lot on trails. Pain is most often caused by first couple steps in the morning or after sitting. Aggrevating  factors include standing or walking for too long. Alleviating factors include rest. she has tried new shoes and icing with a water bottle prior to this visit.         History obtained from: patient    Review of  Systems  Past Medical History   Past Medical History:   Diagnosis Date    Hypothyroidism     Obesity      Past Surgical History:   Procedure Laterality Date    US GUIDED THYROID BIOPSY  11/13/2018     Family History   Problem Relation Age of Onset    No Known Problems Mother     Heart attack Father 45    Hypertension Father     Hyperlipidemia Father     Heart disease Father     No Known Problems Sister     No Known Problems Brother     No Known Problems Daughter     No Known Problems Son     No Known Problems Maternal Grandmother     No Known Problems Daughter     Breast cancer Neg Hx     Colon cancer Neg Hx     Ovarian cancer Neg Hx       reports that she quit smoking about 9 years ago. Her smoking use included cigarettes. She has never used smokeless tobacco. She reports current alcohol use. She reports that she does not use drugs.  Current Outpatient Medications on File Prior to Visit   Medication Sig Dispense Refill    Cholecalciferol (Vitamin D3) 125 MCG (5000 UT) CAPS 1 cap daily      ferrous sulfate 324 (65 Fe) mg 1 tab daily at dinner      levothyroxine (Levo-T) 125 mcg tablet Take 1 tablet (125 mcg total) by mouth daily 90 tablet 1    LORazepam (Ativan) 0.5 mg tablet Take 1 tablet (0.5 mg total) by mouth daily as needed for anxiety (Patient not taking: Reported on 11/15/2024) 30 tablet 0     No current facility-administered medications on file prior to visit.   No Known Allergies   Current Outpatient Medications on File Prior to Visit   Medication Sig Dispense Refill    Cholecalciferol (Vitamin D3) 125 MCG (5000 UT) CAPS 1 cap daily      ferrous sulfate 324 (65 Fe) mg 1 tab daily at dinner      levothyroxine (Levo-T) 125 mcg tablet Take 1 tablet (125 mcg total) by mouth daily 90 tablet 1    LORazepam (Ativan) 0.5 mg tablet Take 1 tablet (0.5 mg total) by mouth daily as needed for anxiety (Patient not taking: Reported on 11/15/2024) 30 tablet 0     No current facility-administered medications on file prior  "to visit.         Objective   /70   Ht 5' 5\" (1.651 m)   Wt 128 kg (282 lb)   BMI 46.93 kg/m²      Physical Exam    MSK:  -Pain on palpation of left medial calcaneal tubercle at the insertion site of the plantar fascia  -no pain with compression of both sides of heel  -No gross deformities noted  -Active range of motion lesser digits intact  -MMT 5/5 to all muscle compartments of the lower extremity  -Ankle dorsiflexion is less than 10 degrees with knee extended, and knee flexed    Derm:  -No lesions, abrasions, or open wounds noted  -No noted interdigital maceration, peeling, malodor  -No areas of callus formation noted on exam  -no erythema, ecchymosis, edema noted     Vascular:  -DP and PT pulses intact b/l  -Capillary refill time <2 sec b/l  -Skin temp: WNL    Neuro:  -Light sensation intact bilaterally  -Protective sensation intact bilaterally  -Tinel sign negative    "

## 2024-11-15 NOTE — ASSESSMENT & PLAN NOTE
Orders:    P.F. Night Splint    meloxicam (Mobic) 7.5 mg tablet; Take 1 tablet (7.5 mg total) by mouth daily    Orthotics B/L    Ambulatory Referral to Physical Therapy; Future

## 2024-12-10 ENCOUNTER — EVALUATION (OUTPATIENT)
Dept: PHYSICAL THERAPY | Facility: OTHER | Age: 39
End: 2024-12-10
Payer: COMMERCIAL

## 2024-12-10 DIAGNOSIS — M72.2 PLANTAR FASCIITIS, BILATERAL: Primary | ICD-10-CM

## 2024-12-10 DIAGNOSIS — M21.41 PES PLANUS OF BOTH FEET: ICD-10-CM

## 2024-12-10 DIAGNOSIS — M21.42 PES PLANUS OF BOTH FEET: ICD-10-CM

## 2024-12-10 PROCEDURE — 97140 MANUAL THERAPY 1/> REGIONS: CPT | Performed by: PHYSICAL THERAPIST

## 2024-12-10 PROCEDURE — 97110 THERAPEUTIC EXERCISES: CPT | Performed by: PHYSICAL THERAPIST

## 2024-12-10 PROCEDURE — 97760 ORTHOTIC MGMT&TRAING 1ST ENC: CPT | Performed by: PHYSICAL THERAPIST

## 2024-12-10 PROCEDURE — 97162 PT EVAL MOD COMPLEX 30 MIN: CPT | Performed by: PHYSICAL THERAPIST

## 2024-12-10 NOTE — PROGRESS NOTES
PT Evaluation     Today's date: 12/10/2024  Patient name: Mary Whitfield  : 1985  MRN: [id number]  Referring provider: Vikas Conrad DPM  Dx:   Encounter Diagnosis     ICD-10-CM    1. Plantar fasciitis, bilateral  M72.2       2. Pes planus of both feet  M21.41     M21.42           Start Time: 1500  Stop Time: 1600  Total time in clinic (min): 60 minutes    Assessment  Impairments: abnormal gait, abnormal movement, activity intolerance, impaired physical strength, pain with function and weight-bearing intolerance    Assessment details: Mary Whitfield is a pleasant 38 y.o. female who presents with signs and symptoms consistent with plantar fasciitis. She demonstrates a pes planus foot structure, excessive pronation with gait and poor foot and ankle neuromuscular control. She would benefit from a custom for orthotis to correct her pronation and offload her painful heel. She would also benefit from use of modality such as ESWT to address her chronic inflammation and skilled PT to address her strength and neuromuscular control deficits in order to help regain full functional mobility.         Goals  STG's to be achieved in 4 weeks:  1) Patient will demonstrate normal pain free LE ROM.   2) Patient will improve LE strength by 1/2 muscle grade.   3) Patient will report no greater than 2/10 foot pain with prolonged standing.     LTG's to be achieved in 8 weeks:  1) Patient will be independent and compliant with HEP.   2) Patient will report no greater than 2/30 foot pain with prolonged walking, 30 minutes or greater.   3) Patient will score 75 or greater on FOTO.        Subjective Evaluation    History of Present Illness  Mechanism of injury: Patient reports a history of plantar fasciitis for last 8 weeks. Reports she has tried several different pairs of shoes. 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 Patient enjoys walking and going to the gym but is now limited in activity  "due to pain. Reports her pain goes from mid-heel down throughout arch.   Pain  Current pain ratin  At best pain ratin  At worst pain ratin          Objective     Active Range of Motion   Left Ankle/Foot   Normal active range of motion  Great toe extension: with pain    Right Ankle/Foot   Normal active range of motion  Great toe extension: with pain    Joint Play   Left Ankle/Foot  Joints within functional limits are the talocrural joint, subtalar joint, midfoot and forefoot.     Right Ankle/Foot  Joints within functional limits are the talocrural joint, subtalar joint, midfoot and forefoot.     Strength/Myotome Testing     Additional Strength Details  R Ankle: anterior tib-(5/5), posterior tib-(4+/5), fib longus/brevis-(4+/5), fib tertius-(4+/5)  L Ankle: DF- anterior tib-(5/5), posterior tib-(4/5), fib longus/brevis-(4/5), fib tertius-(4/5)  Foot:   R EHL: (4+/5), EDL (4+/5), FHL (4+/5), FDL (4+/5)  L EHL: (4+/5) EDL (4+/5), FHL (4/5), FDL (4/5)      General Comments:      Ankle/Foot Comments   Foot posture Index:  R- calcaneal valgus (1), too many toes (0), malleoli (1), talonavicular bulge (2), talar dome (2), medial longitudinal arch (1) Total Score R- 7  L- calcaneal valgus (1), too many toes (0), malleoli (1), talonavicular bulge (2), talar dome (2), medial longitudinal arch (1) Total Score L- 7    Gait- early excessive pronation    Heel raise- able to resupinate    Minisquat- excessive pronation    Tenderness to palpate medial calcaneal tubercle bilaterally             Precautions: ESWT only 1 x a week      Manuals 12/10            ESWT b/l med calc tubercle 2500 p 21 Hz 1.5 small metal (red)                                                   Neuro Re-Ed             Short foot             Toe yoga 10 x 5\"            TB FHL, FDL OTB 10 ea            Eccentric heel raise 10x 10\"            Lateral step down w/ short foot             [facility]                           Ther Ex             Slant " "board stretch             Reverse lunge great toe ext 10 x 10\"                                                                                          Ther Activity                                       Gait Training                                       Modalities                                            "

## 2024-12-23 ENCOUNTER — APPOINTMENT (OUTPATIENT)
Dept: PHYSICAL THERAPY | Facility: OTHER | Age: 39
End: 2024-12-23
Payer: COMMERCIAL

## 2024-12-23 NOTE — PROGRESS NOTES
"Daily Note     Today's date: 2024  Patient name: Mary Whitfield  : 1985  MRN: [id number]  Referring provider: Vikas Conrad DPM  Dx: No diagnosis found.               Subjective: ***      Objective: See treatment diary below      Assessment: Tolerated treatment {Tolerated treatment :}. Patient {assessment:}      Plan: {PLAN:}     Precautions: ESWT only 1 x a week      Manuals 12/10            ESWT b/l med calc tubercle 2500 p 21 Hz 1.5 small metal (red)                                                   Neuro Re-Ed             Short foot             Toe yoga 10 x 5\"            TB FHL, FDL OTB 10 ea            Eccentric heel raise 10x 10\"            Lateral step down w/ short foot             [facility]                           Ther Ex             Slant board stretch             Reverse lunge great toe ext 10 x 10\"                                                                                          Ther Activity                                       Gait Training                                       Modalities                                            "

## 2025-01-13 DIAGNOSIS — E06.3 HYPOTHYROIDISM DUE TO HASHIMOTO'S THYROIDITIS: Primary | ICD-10-CM

## 2025-01-15 ENCOUNTER — APPOINTMENT (OUTPATIENT)
Dept: LAB | Facility: CLINIC | Age: 40
End: 2025-01-15
Payer: COMMERCIAL

## 2025-01-15 DIAGNOSIS — E06.3 HYPOTHYROIDISM DUE TO HASHIMOTO'S THYROIDITIS: ICD-10-CM

## 2025-01-15 LAB — TSH SERPL DL<=0.05 MIU/L-ACNC: 2.93 UIU/ML (ref 0.45–4.5)

## 2025-01-15 PROCEDURE — 36415 COLL VENOUS BLD VENIPUNCTURE: CPT

## 2025-01-15 PROCEDURE — 84443 ASSAY THYROID STIM HORMONE: CPT

## 2025-01-16 ENCOUNTER — RESULTS FOLLOW-UP (OUTPATIENT)
Dept: ENDOCRINOLOGY | Facility: CLINIC | Age: 40
End: 2025-01-16

## 2025-01-16 DIAGNOSIS — E06.3 HYPOTHYROIDISM DUE TO HASHIMOTO'S THYROIDITIS: ICD-10-CM

## 2025-01-16 RX ORDER — LEVOTHYROXINE SODIUM 125 UG/1
125 TABLET ORAL DAILY
Qty: 90 TABLET | Refills: 0 | Status: SHIPPED | OUTPATIENT
Start: 2025-01-16

## 2025-03-10 ENCOUNTER — APPOINTMENT (OUTPATIENT)
Dept: LAB | Facility: CLINIC | Age: 40
End: 2025-03-10
Payer: COMMERCIAL

## 2025-03-10 ENCOUNTER — OFFICE VISIT (OUTPATIENT)
Dept: FAMILY MEDICINE CLINIC | Facility: CLINIC | Age: 40
End: 2025-03-10
Payer: COMMERCIAL

## 2025-03-10 ENCOUNTER — PATIENT MESSAGE (OUTPATIENT)
Dept: FAMILY MEDICINE CLINIC | Facility: CLINIC | Age: 40
End: 2025-03-10

## 2025-03-10 VITALS
HEIGHT: 65 IN | BODY MASS INDEX: 46.65 KG/M2 | SYSTOLIC BLOOD PRESSURE: 118 MMHG | WEIGHT: 280 LBS | TEMPERATURE: 97.1 F | OXYGEN SATURATION: 98 % | HEART RATE: 62 BPM | DIASTOLIC BLOOD PRESSURE: 80 MMHG | RESPIRATION RATE: 16 BRPM

## 2025-03-10 DIAGNOSIS — E66.01 MORBID OBESITY (HCC): ICD-10-CM

## 2025-03-10 DIAGNOSIS — Z00.00 HEALTH MAINTENANCE EXAMINATION: Primary | ICD-10-CM

## 2025-03-10 DIAGNOSIS — E55.9 VITAMIN D DEFICIENCY: ICD-10-CM

## 2025-03-10 DIAGNOSIS — Z00.8 EXAM FOR POPULATION SURVEY: ICD-10-CM

## 2025-03-10 DIAGNOSIS — E06.3 HYPOTHYROIDISM DUE TO HASHIMOTO'S THYROIDITIS: ICD-10-CM

## 2025-03-10 DIAGNOSIS — E61.1 IRON DEFICIENCY: ICD-10-CM

## 2025-03-10 DIAGNOSIS — E04.2 MULTIPLE THYROID NODULES: ICD-10-CM

## 2025-03-10 DIAGNOSIS — F41.1 GENERALIZED ANXIETY DISORDER: ICD-10-CM

## 2025-03-10 LAB
25(OH)D3 SERPL-MCNC: 23.1 NG/ML (ref 30–100)
ALBUMIN SERPL BCG-MCNC: 4 G/DL (ref 3.5–5)
ALP SERPL-CCNC: 63 U/L (ref 34–104)
ALT SERPL W P-5'-P-CCNC: 17 U/L (ref 7–52)
ANION GAP SERPL CALCULATED.3IONS-SCNC: 6 MMOL/L (ref 4–13)
AST SERPL W P-5'-P-CCNC: 19 U/L (ref 13–39)
BASOPHILS # BLD AUTO: 0.05 THOUSANDS/ÂΜL (ref 0–0.1)
BASOPHILS NFR BLD AUTO: 1 % (ref 0–1)
BILIRUB SERPL-MCNC: 0.37 MG/DL (ref 0.2–1)
BUN SERPL-MCNC: 13 MG/DL (ref 5–25)
CALCIUM SERPL-MCNC: 9.1 MG/DL (ref 8.4–10.2)
CHLORIDE SERPL-SCNC: 106 MMOL/L (ref 96–108)
CHOLEST SERPL-MCNC: 132 MG/DL (ref ?–200)
CO2 SERPL-SCNC: 28 MMOL/L (ref 21–32)
CREAT SERPL-MCNC: 0.6 MG/DL (ref 0.6–1.3)
EOSINOPHIL # BLD AUTO: 0.38 THOUSAND/ÂΜL (ref 0–0.61)
EOSINOPHIL NFR BLD AUTO: 5 % (ref 0–6)
ERYTHROCYTE [DISTWIDTH] IN BLOOD BY AUTOMATED COUNT: 13.2 % (ref 11.6–15.1)
EST. AVERAGE GLUCOSE BLD GHB EST-MCNC: 108 MG/DL
FERRITIN SERPL-MCNC: 22 NG/ML (ref 11–307)
GFR SERPL CREATININE-BSD FRML MDRD: 116 ML/MIN/1.73SQ M
GLUCOSE P FAST SERPL-MCNC: 96 MG/DL (ref 65–99)
HBA1C MFR BLD: 5.4 %
HCT VFR BLD AUTO: 39.8 % (ref 34.8–46.1)
HDLC SERPL-MCNC: 49 MG/DL
HGB BLD-MCNC: 12.7 G/DL (ref 11.5–15.4)
IMM GRANULOCYTES # BLD AUTO: 0.01 THOUSAND/UL (ref 0–0.2)
IMM GRANULOCYTES NFR BLD AUTO: 0 % (ref 0–2)
IRON SATN MFR SERPL: 11 % (ref 15–50)
IRON SERPL-MCNC: 39 UG/DL (ref 50–212)
LDLC SERPL CALC-MCNC: 72 MG/DL (ref 0–100)
LYMPHOCYTES # BLD AUTO: 1.94 THOUSANDS/ÂΜL (ref 0.6–4.47)
LYMPHOCYTES NFR BLD AUTO: 26 % (ref 14–44)
MCH RBC QN AUTO: 28.2 PG (ref 26.8–34.3)
MCHC RBC AUTO-ENTMCNC: 31.9 G/DL (ref 31.4–37.4)
MCV RBC AUTO: 88 FL (ref 82–98)
MONOCYTES # BLD AUTO: 0.53 THOUSAND/ÂΜL (ref 0.17–1.22)
MONOCYTES NFR BLD AUTO: 7 % (ref 4–12)
NEUTROPHILS # BLD AUTO: 4.7 THOUSANDS/ÂΜL (ref 1.85–7.62)
NEUTS SEG NFR BLD AUTO: 61 % (ref 43–75)
NONHDLC SERPL-MCNC: 83 MG/DL
NRBC BLD AUTO-RTO: 0 /100 WBCS
PLATELET # BLD AUTO: 355 THOUSANDS/UL (ref 149–390)
PMV BLD AUTO: 11.2 FL (ref 8.9–12.7)
POTASSIUM SERPL-SCNC: 4.3 MMOL/L (ref 3.5–5.3)
PROT SERPL-MCNC: 6.9 G/DL (ref 6.4–8.4)
RBC # BLD AUTO: 4.5 MILLION/UL (ref 3.81–5.12)
SODIUM SERPL-SCNC: 140 MMOL/L (ref 135–147)
TIBC SERPL-MCNC: 366.8 UG/DL (ref 250–450)
TRANSFERRIN SERPL-MCNC: 262 MG/DL (ref 203–362)
TRIGL SERPL-MCNC: 57 MG/DL (ref ?–150)
UIBC SERPL-MCNC: 328 UG/DL (ref 155–355)
WBC # BLD AUTO: 7.61 THOUSAND/UL (ref 4.31–10.16)

## 2025-03-10 PROCEDURE — 82728 ASSAY OF FERRITIN: CPT

## 2025-03-10 PROCEDURE — 85025 COMPLETE CBC W/AUTO DIFF WBC: CPT

## 2025-03-10 PROCEDURE — 80053 COMPREHEN METABOLIC PANEL: CPT

## 2025-03-10 PROCEDURE — 36415 COLL VENOUS BLD VENIPUNCTURE: CPT

## 2025-03-10 PROCEDURE — 82306 VITAMIN D 25 HYDROXY: CPT

## 2025-03-10 PROCEDURE — 99213 OFFICE O/P EST LOW 20 MIN: CPT | Performed by: NURSE PRACTITIONER

## 2025-03-10 PROCEDURE — 83550 IRON BINDING TEST: CPT

## 2025-03-10 PROCEDURE — 83036 HEMOGLOBIN GLYCOSYLATED A1C: CPT

## 2025-03-10 PROCEDURE — 80061 LIPID PANEL: CPT

## 2025-03-10 PROCEDURE — 99395 PREV VISIT EST AGE 18-39: CPT | Performed by: NURSE PRACTITIONER

## 2025-03-10 PROCEDURE — 83540 ASSAY OF IRON: CPT

## 2025-03-10 NOTE — ASSESSMENT & PLAN NOTE
Will update labs. On supplementation   Orders:    CBC and differential; Future    Iron Panel (Includes Ferritin, Iron Sat%, Iron, and TIBC); Future

## 2025-03-10 NOTE — ASSESSMENT & PLAN NOTE
Stable with therapy. No use of ativan anymore.   Needs [facility] updated- will update [facility] paperwork for patient. She is going to send them to us.

## 2025-03-10 NOTE — PROGRESS NOTES
Adult Annual Physical  Name: Mary Whitfield      : 1985      MRN: [id number]  Encounter Provider: FRIDA Xiao  Encounter Date: 3/10/2025   Encounter department: [facility] PRIMARY CARE    Assessment & Plan  Health maintenance examination         Vitamin D deficiency  Will update labs. On supplementation   Orders:  •  Vitamin D 25 hydroxy; Future    Morbid obesity (HCC)  Continue with healthy lifestyle changes.     Orders:  •  Comprehensive metabolic panel; Future    Iron deficiency  Will update labs. On supplementation   Orders:  •  CBC and differential; Future  •  Iron Panel (Includes Ferritin, Iron Sat%, Iron, and TIBC); Future    Exam for population survey    Orders:  •  Lipid panel; Future  •  Hemoglobin A1C; Future    Generalized anxiety disorder  Stable with therapy. No use of ativan anymore.   Needs FMLA updated- will update FMLA paperwork for patient. She is going to send them to us.        Multiple thyroid nodules  Follows with endo for management          Hypothyroidism due to Hashimoto's thyroiditis  Continue to follow with endocrinology for management.          Immunizations and preventive care screenings were discussed with patient today. Appropriate education was printed on patient's after visit summary.    Counseling:  Dental Health: discussed importance of regular tooth brushing, flossing, and dental visits.  Exercise: the importance of regular exercise/physical activity was discussed. Recommend exercise 3-5 times per week for at least 30 minutes.          History of Present Illness     Adult Annual Physical:  Patient presents for annual physical. Here for well visit. No concerns. .     Diet and Physical Activity:  - Diet/Nutrition: well balanced diet.  - Exercise: 3-4 times a week on average. recently started again    Depression Screening:  - PHQ-2 Score: 0    General Health:  - Sleep: sleeps well.  - Vision: no vision problems and goes for regular eye  "exams. encouraged to f/u with them for routine screening  - Dental: regular dental visits.    /GYN Health:  - Follows with GYN: yes.   - Menopause: premenopausal.     Review of Systems   Constitutional:  Negative for chills and fever.   Eyes:  Negative for discharge.   Respiratory:  Negative for shortness of breath.    Cardiovascular:  Negative for chest pain.   Gastrointestinal:  Negative for constipation and diarrhea.   Genitourinary:  Negative for difficulty urinating.   Musculoskeletal:  Negative for joint swelling.   Skin:  Negative for rash.   Neurological:  Negative for headaches.   Hematological:  Negative for adenopathy.   Psychiatric/Behavioral:  The patient is not nervous/anxious.          Objective   /80   Pulse 62   Temp (!) 97.1 °F (36.2 °C) (Temporal)   Resp 16   Ht 5' 5\" (1.651 m)   Wt 127 kg (280 lb)   SpO2 98%   BMI 46.59 kg/m²     Physical Exam  Vitals and nursing note reviewed.   Constitutional:       General: She is not in acute distress.     Appearance: Normal appearance. She is obese. She is not ill-appearing, toxic-appearing or diaphoretic.   HENT:      Head: Normocephalic and atraumatic.      Right Ear: External ear normal.      Left Ear: External ear normal.      Nose: Nose normal.      Mouth/Throat:      Mouth: Mucous membranes are moist.      Pharynx: Oropharynx is clear. No oropharyngeal exudate or posterior oropharyngeal erythema.   Eyes:      General: Lids are normal. No scleral icterus.        Right eye: No discharge.         Left eye: No discharge.      Extraocular Movements: Extraocular movements intact.      Conjunctiva/sclera: Conjunctivae normal.      Pupils: Pupils are equal, round, and reactive to light.   Cardiovascular:      Rate and Rhythm: Normal rate and regular rhythm. No extrasystoles are present.     Heart sounds: S1 normal and S2 normal. No murmur heard.  Pulmonary:      Effort: Pulmonary effort is normal. No respiratory distress.      Breath sounds: " Normal breath sounds. No stridor or decreased air movement. No wheezing or rhonchi.   Abdominal:      General: Bowel sounds are normal.      Palpations: Abdomen is soft.   Musculoskeletal:         General: Normal range of motion.      Cervical back: Normal range of motion and neck supple.   Skin:     General: Skin is warm and dry.   Neurological:      General: No focal deficit present.      Mental Status: She is alert and oriented to person, place, and time. Mental status is at baseline.      Cranial Nerves: No cranial nerve deficit.      Sensory: No sensory deficit.      Motor: No weakness.      Coordination: Coordination normal.      Gait: Gait normal.   Psychiatric:         Attention and Perception: Attention and perception normal.         Mood and Affect: Mood and affect normal.         Speech: Speech normal.         Behavior: Behavior is cooperative.         Cognition and Memory: Cognition normal.         Judgment: Judgment normal.

## 2025-03-11 ENCOUNTER — RESULTS FOLLOW-UP (OUTPATIENT)
Dept: FAMILY MEDICINE CLINIC | Facility: CLINIC | Age: 40
End: 2025-03-11

## 2025-03-12 ENCOUNTER — OFFICE VISIT (OUTPATIENT)
Dept: ENDOCRINOLOGY | Facility: CLINIC | Age: 40
End: 2025-03-12
Payer: COMMERCIAL

## 2025-03-12 VITALS
OXYGEN SATURATION: 98 % | DIASTOLIC BLOOD PRESSURE: 80 MMHG | BODY MASS INDEX: 46.65 KG/M2 | SYSTOLIC BLOOD PRESSURE: 122 MMHG | HEIGHT: 65 IN | HEART RATE: 83 BPM | WEIGHT: 280 LBS

## 2025-03-12 DIAGNOSIS — E04.2 MULTIPLE THYROID NODULES: Primary | ICD-10-CM

## 2025-03-12 DIAGNOSIS — E66.813 CLASS 3 SEVERE OBESITY WITH BODY MASS INDEX (BMI) OF 45.0 TO 49.9 IN ADULT, UNSPECIFIED OBESITY TYPE, UNSPECIFIED WHETHER SERIOUS COMORBIDITY PRESENT (HCC): ICD-10-CM

## 2025-03-12 DIAGNOSIS — E66.01 CLASS 3 SEVERE OBESITY WITH BODY MASS INDEX (BMI) OF 45.0 TO 49.9 IN ADULT, UNSPECIFIED OBESITY TYPE, UNSPECIFIED WHETHER SERIOUS COMORBIDITY PRESENT (HCC): ICD-10-CM

## 2025-03-12 DIAGNOSIS — E06.3 HYPOTHYROIDISM DUE TO HASHIMOTO'S THYROIDITIS: ICD-10-CM

## 2025-03-12 DIAGNOSIS — E55.9 VITAMIN D DEFICIENCY: ICD-10-CM

## 2025-03-12 DIAGNOSIS — E61.1 IRON DEFICIENCY: ICD-10-CM

## 2025-03-12 PROCEDURE — 99214 OFFICE O/P EST MOD 30 MIN: CPT | Performed by: PHYSICIAN ASSISTANT

## 2025-03-12 RX ORDER — LEVOTHYROXINE SODIUM 125 UG/1
125 TABLET ORAL DAILY
Qty: 90 TABLET | Refills: 0 | Status: SHIPPED | OUTPATIENT
Start: 2025-03-12

## 2025-03-12 NOTE — PROGRESS NOTES
Name: Mary Whitfield      : 1985      MRN: [id number]  Encounter Provider: Brittany Maria PA-C  Encounter Date: 3/12/2025   Encounter department: [facility] [place]    Chief Complaint   Patient presents with   • Hypothyroidism   :  Assessment & Plan  Multiple thyroid nodules  Thyroid 2023: These are stable with previous non-malignant biopsy results as above. Based on 2015 SNEHA guidelines, additional followup ultrasound should be performed in 12 to 24 months. Heterogeneous echotexture of the thyroid with hyperemia.  Orders:  •  TSH, 3rd generation with Free T4 reflex; Future  •  US thyroid; Future  •  TSH, 3rd generation with Free T4 reflex; Future    Hypothyroidism due to Hashimoto's thyroiditis  Maintained on 125mcg daily, continue  Orders:  •  TSH, 3rd generation with Free T4 reflex; Future  •  US thyroid; Future  •  levothyroxine (Levo-T) 125 mcg tablet; Take 1 tablet (125 mcg total) by mouth daily    Class 3 severe obesity with body mass index (BMI) of 45.0 to 49.9 in adult, unspecified obesity type, unspecified whether serious comorbidity present (HCC)  Patient requesting referral to nutrition services  Orders:  •  Ambulatory referral to Nutrition Services; Future    Vitamin D deficiency  Patient recently resumed supplementation  Orders:  •  Vitamin D 25 hydroxy; Future    Iron deficiency  Patient recently resumed supplementation  Orders:  •  Iron Panel (Includes Ferritin, Iron Sat%, Iron, and TIBC); Future        History of Present Illness {?Quick Links Encounters * My Last Note * Last Note in Specialty * Snapshot * Since Last Visit * History :62161}    Mary Whitfield is a 39 y.o. femalewith a past medical history of thyroid nodules and hypothyroidism who presents for follow up.     Patient has been taking levothyroxine first thing in the morning with water, separate from food and other medications by at least one hour, and from supplements by  "at least 4 hours. Denies weight fluctuations, changes in hair growth patterns, difficulty swallowing, breathing or changes in voice. She does report some mood swings, she is in therapy and has taken herself off of psychiatric medications.         Review of Systems as per [facility]       Medical History Reviewed by provider this encounter:  Tobacco  Allergies  Meds  Problems  Med Hx  Surg Hx  Fam Hx     .    Objective {?Quick Links Trend Vitals * Enter New Vitals * Results Review * Timeline (Adult) * Labs * Imaging * Cardiology * Procedures * Lung Cancer Screening * Surgical eConsent :31053}  /80   Pulse 83   Ht 5' 5\" (1.651 m)   Wt 127 kg (280 lb)   SpO2 98%   BMI 46.59 kg/m²      Body mass index is 46.59 kg/m².  Wt Readings from Last 3 Encounters:   03/12/25 127 kg (280 lb)   03/10/25 127 kg (280 lb)   11/15/24 128 kg (282 lb)     Physical Exam  Vitals and nursing note reviewed.   Constitutional:       General: She is not in acute distress.     Appearance: Normal appearance. She is well-developed. She is morbidly obese.   HENT:      Head: Normocephalic and atraumatic.   Eyes:      General: No scleral icterus.     Conjunctiva/sclera: Conjunctivae normal.   Cardiovascular:      Rate and Rhythm: Normal rate and regular rhythm.      Heart sounds: No murmur heard.  Pulmonary:      Effort: Pulmonary effort is normal.      Breath sounds: No wheezing, rhonchi or rales.   Abdominal:      General: There is no distension.      Palpations: Abdomen is soft.   Skin:     General: Skin is warm and dry.   Neurological:      Mental Status: She is alert. Mental status is at baseline.   Psychiatric:         Mood and Affect: Mood normal.         Labs:   Lab Results   Component Value Date    HGBA1C 5.4 03/10/2025    HGBA1C 5.4 01/05/2024    HGBA1C 5.1 07/05/2023     Lab Results   Component Value Date    CREATININE 0.60 03/10/2025    CREATININE 0.69 01/05/2024    CREATININE 0.82 07/05/2023    BUN 13 03/10/2025    K 4.3 " 03/10/2025     03/10/2025    CO2 28 03/10/2025     eGFR   Date Value Ref Range Status   03/10/2025 116 ml/min/1.73sq m Final     Lab Results   Component Value Date    HDL 49 (L) 03/10/2025    TRIG 57 03/10/2025     Lab Results   Component Value Date    ALT 17 03/10/2025    AST 19 03/10/2025    ALKPHOS 63 03/10/2025     Lab Results   Component Value Date    IKJ9JWDADSNS 2.934 01/15/2025    KWO6KMTUSPYL 1.250 08/27/2024    JIB8UAIGHICM 4.620 (H) 07/12/2024     Lab Results   Component Value Date    FREET4 0.91 08/27/2024       Patient Instructions   Ensure you are taking your thyroid medication at least one hour prior to meals, on an empty stomach and 4 hours before any vitamins/supplements  Obtain labs prior to follow-up appointment  Hold all biotin-containing supplements for three days prior to thyroid lab draws      Discussed with the patient and all questioned fully answered. She will call me if any problems arise.

## 2025-03-12 NOTE — ASSESSMENT & PLAN NOTE
Patient recently resumed supplementation  Orders:  •  Iron Panel (Includes Ferritin, Iron Sat%, Iron, and TIBC); Future

## 2025-03-12 NOTE — ASSESSMENT & PLAN NOTE
Maintained on 125mcg daily, continue  Orders:  •  TSH, 3rd generation with Free T4 reflex; Future  •  US thyroid; Future  •  levothyroxine (Levo-T) 125 mcg tablet; Take 1 tablet (125 mcg total) by mouth daily

## 2025-03-12 NOTE — ASSESSMENT & PLAN NOTE
Patient requesting referral to nutrition services  Orders:  •  Ambulatory referral to Nutrition Services; Future

## 2025-03-14 NOTE — PATIENT COMMUNICATION
Pt called to let know her [employer] paperwork is completed and ready for .  Pt requested it be sent to her My Chart.  Paper work faxed to her employer as well as her My chart.  Pt was seen for a visit this week so no form charge attached.

## 2025-04-08 ENCOUNTER — HOSPITAL ENCOUNTER (OUTPATIENT)
Dept: RADIOLOGY | Facility: HOSPITAL | Age: 40
Discharge: HOME | End: 2025-04-08
Payer: COMMERCIAL

## 2025-04-08 ENCOUNTER — OFFICE VISIT (OUTPATIENT)
Dept: FAMILY MEDICINE CLINIC | Facility: CLINIC | Age: 40
End: 2025-04-08
Payer: COMMERCIAL

## 2025-04-08 VITALS
HEIGHT: 65 IN | RESPIRATION RATE: 16 BRPM | TEMPERATURE: 97.3 F | OXYGEN SATURATION: 99 % | WEIGHT: 282 LBS | DIASTOLIC BLOOD PRESSURE: 80 MMHG | BODY MASS INDEX: 46.98 KG/M2 | HEART RATE: 65 BPM | SYSTOLIC BLOOD PRESSURE: 124 MMHG

## 2025-04-08 DIAGNOSIS — E55.9 VITAMIN D DEFICIENCY: ICD-10-CM

## 2025-04-08 DIAGNOSIS — M79.661 PAIN IN RIGHT SHIN: ICD-10-CM

## 2025-04-08 DIAGNOSIS — E66.813 CLASS 3 SEVERE OBESITY WITHOUT SERIOUS COMORBIDITY WITH BODY MASS INDEX (BMI) OF 45.0 TO 49.9 IN ADULT, UNSPECIFIED OBESITY TYPE: ICD-10-CM

## 2025-04-08 DIAGNOSIS — M79.661 PAIN IN RIGHT SHIN: Primary | ICD-10-CM

## 2025-04-08 PROCEDURE — 99214 OFFICE O/P EST MOD 30 MIN: CPT | Performed by: FAMILY MEDICINE

## 2025-04-08 PROCEDURE — 73590 X-RAY EXAM OF LOWER LEG: CPT

## 2025-04-08 NOTE — ASSESSMENT & PLAN NOTE
Take Vitamin d  Orders:    XR tibia fibula 2 vw right; Future    Ambulatory Referral to Orthopedic Surgery; Future

## 2025-04-08 NOTE — PROGRESS NOTES
Name: Mary Whitfield      : 1985      MRN: [id number]  Encounter Provider: Crystal Holden DO  Encounter Date: 2025   Encounter department: [facility] PRIMARY CARE  Chief Complaint   Patient presents with    Leg Pain     Right leg pain times 2 months      Patient Instructions   Check right tib fib due to 2 months of pain comes and goes. Rest right leg and consult orthopedics as directed. Tylenol or advil prn pain with food. Take Vitamin D3 as directed. Sees endo.     Assessment & Plan  Pain in right shin  Rest right leg and consult orthopedics and check xrayu right tib/fib  Orders:    XR tibia fibula 2 vw right; Future    Ambulatory Referral to Orthopedic Surgery; Future    Class 3 severe obesity without serious comorbidity with body mass index (BMI) of 45.0 to 49.9 in adult, unspecified obesity type (HCC)  Lose weight as directed to get BMI lower than 25         Vitamin D deficiency  Take Vitamin d  Orders:    XR tibia fibula 2 vw right; Future    Ambulatory Referral to Orthopedic Surgery; Future         History of Present Illness     Leg Pain (Right leg pain times 2 months ) patient with right shin pain s/p trauma hit against furniture.     Leg Pain       Review of Systems   Constitutional: Negative.    HENT: Negative.     Eyes: Negative.    Respiratory: Negative.     Cardiovascular: Negative.    Gastrointestinal: Negative.    Endocrine: Negative.    Genitourinary: Negative.    Musculoskeletal:         Leg Pain (Right leg pain times 2 months ) - right shin pain comes and goes   Skin: Negative.    Allergic/Immunologic: Negative.    Neurological: Negative.    Hematological: Negative.    Psychiatric/Behavioral: Negative.       Past Medical History:   Diagnosis Date    Hypothyroidism     Obesity      Past Surgical History:   Procedure Laterality Date    US GUIDED THYROID BIOPSY  2018     Family History   Problem Relation Age of Onset    No Known Problems Mother     Heart attack  "Father 45    Hypertension Father     Hyperlipidemia Father     Heart disease Father     No Known Problems Sister     No Known Problems Brother     No Known Problems Daughter     No Known Problems Son     No Known Problems Maternal Grandmother     No Known Problems Daughter     Breast cancer Neg Hx     Colon cancer Neg Hx     Ovarian cancer Neg Hx      Social History     Tobacco Use    Smoking status: Former     Types: Cigarettes    Smokeless tobacco: Never   Vaping Use    Vaping status: Never Used   Substance and Sexual Activity    Alcohol use: Yes     Alcohol/week: 0.0 standard drinks of alcohol     Comment: socially    Drug use: No    Sexual activity: Yes     Partners: Male     Birth control/protection: Condom Male     Current Outpatient Medications on File Prior to Visit   Medication Sig    Cholecalciferol (Vitamin D3) 125 MCG (5000 UT) CAPS 1 cap daily    ferrous sulfate 324 (65 Fe) mg 1 tab daily at dinner    levothyroxine (Levo-T) 125 mcg tablet Take 1 tablet (125 mcg total) by mouth daily     No Known Allergies  Immunization History   Administered Date(s) Administered    COVID-19 PFIZER VACCINE 0.3 ML IM 03/25/2021, 04/15/2021    INFLUENZA 11/14/2019    Influenza, injectable, quadrivalent, preservative free 0.5 mL 11/01/2018, 11/12/2020, 11/19/2021    Tdap 12/06/2008, 09/27/2018     Objective   /80   Pulse 65   Temp (!) 97.3 °F (36.3 °C) (Temporal)   Resp 16   Ht 5' 5\" (1.651 m)   Wt 128 kg (282 lb)   SpO2 99%   BMI 46.93 kg/m²     Physical Exam  Constitutional:       Appearance: She is well-developed. She is obese.   HENT:      Head: Normocephalic and atraumatic.      Right Ear: External ear normal.      Left Ear: External ear normal.      Nose: Nose normal.   Eyes:      Conjunctiva/sclera: Conjunctivae normal.      Pupils: Pupils are equal, round, and reactive to light.   Cardiovascular:      Rate and Rhythm: Normal rate and regular rhythm.      Pulses: Normal pulses.      Heart sounds: Normal " heart sounds.   Pulmonary:      Effort: Pulmonary effort is normal.      Breath sounds: Normal breath sounds.   Musculoskeletal:      Cervical back: Normal range of motion and neck supple.      Comments: Right shin pain with exercise and walking at times and comes ad goes   Skin:     General: Skin is warm and dry.   Neurological:      General: No focal deficit present.      Mental Status: She is alert and oriented to person, place, and time. Mental status is at baseline.      Deep Tendon Reflexes: Reflexes are normal and symmetric.   Psychiatric:         Mood and Affect: Mood normal.         Behavior: Behavior normal.         Thought Content: Thought content normal.         Judgment: Judgment normal.       Administrative Statements   I have spent a total time of 23 minutes in caring for this patient on the day of the visit/encounter including Diagnostic results, Prognosis, Risks and benefits of tx options, Instructions for management, Patient and family education, Importance of tx compliance, Risk factor reductions, Impressions, Counseling / Coordination of care, Documenting in the medical record, Reviewing/placing orders in the medical record (including tests, medications, and/or procedures), and Obtaining or reviewing history  .

## 2025-04-08 NOTE — PATIENT INSTRUCTIONS
Check right tib fib due to 2 months of pain comes and goes. Rest right leg and consult orthopedics as directed. Tylenol or advil prn pain with food. Take Vitamin D3 as directed. Sees vince.

## 2025-04-09 ENCOUNTER — RESULTS FOLLOW-UP (OUTPATIENT)
Dept: FAMILY MEDICINE CLINIC | Facility: CLINIC | Age: 40
End: 2025-04-09

## 2025-05-12 ENCOUNTER — TELEPHONE (OUTPATIENT)
Dept: OTHER | Facility: OTHER | Age: 40
End: 2025-05-12

## 2025-05-13 ENCOUNTER — TELEPHONE (OUTPATIENT)
Age: 40
End: 2025-05-13

## 2025-05-13 NOTE — TELEPHONE ENCOUNTER
Contacted patient off of Talk Therapy  wait list to verify needs of services in attempts to update list with patient preferences. LVM for patient to contact intake dept  in regards to updating wait list.

## 2025-05-20 ENCOUNTER — OFFICE VISIT (OUTPATIENT)
Dept: PHYSICAL THERAPY | Facility: OTHER | Age: 40
End: 2025-05-20
Payer: COMMERCIAL

## 2025-05-20 DIAGNOSIS — M72.2 PLANTAR FASCIITIS, BILATERAL: ICD-10-CM

## 2025-05-20 DIAGNOSIS — M21.41 BILATERAL PES PLANUS: Primary | ICD-10-CM

## 2025-05-20 DIAGNOSIS — M21.42 BILATERAL PES PLANUS: Primary | ICD-10-CM

## 2025-05-20 PROCEDURE — L3010 FOOT LONGITUDINAL ARCH SUPPO: HCPCS | Performed by: PHYSICAL THERAPIST

## 2025-05-28 NOTE — TELEPHONE ENCOUNTER
Reached out to pt to offer talk therapy appt with Deb SHIRLEY for pt to contact intake dept.        Upon return call, please route call  to  (Adrienne).    Second attempt to reach pt. Pt removed from wait list.

## 2025-08-02 ENCOUNTER — APPOINTMENT (OUTPATIENT)
Dept: LAB | Age: 40
End: 2025-08-02
Payer: COMMERCIAL

## 2025-08-02 DIAGNOSIS — E04.2 MULTIPLE THYROID NODULES: ICD-10-CM

## 2025-08-02 DIAGNOSIS — E61.1 IRON DEFICIENCY: ICD-10-CM

## 2025-08-02 DIAGNOSIS — E55.9 VITAMIN D DEFICIENCY: ICD-10-CM

## 2025-08-02 DIAGNOSIS — E06.3 HYPOTHYROIDISM DUE TO HASHIMOTO'S THYROIDITIS: ICD-10-CM

## 2025-08-02 LAB
25(OH)D3 SERPL-MCNC: 27.4 NG/ML (ref 30–100)
FERRITIN SERPL-MCNC: 20 NG/ML (ref 30–307)
IRON SATN MFR SERPL: 26 % (ref 15–50)
IRON SERPL-MCNC: 101 UG/DL (ref 50–212)
TIBC SERPL-MCNC: 382.2 UG/DL (ref 250–450)
TRANSFERRIN SERPL-MCNC: 273 MG/DL (ref 203–362)
TSH SERPL DL<=0.05 MIU/L-ACNC: 1.79 UIU/ML (ref 0.45–4.5)
UIBC SERPL-MCNC: 281 UG/DL (ref 155–355)

## 2025-08-02 PROCEDURE — 83540 ASSAY OF IRON: CPT

## 2025-08-02 PROCEDURE — 82306 VITAMIN D 25 HYDROXY: CPT

## 2025-08-02 PROCEDURE — 84443 ASSAY THYROID STIM HORMONE: CPT

## 2025-08-02 PROCEDURE — 83550 IRON BINDING TEST: CPT

## 2025-08-02 PROCEDURE — 36415 COLL VENOUS BLD VENIPUNCTURE: CPT

## 2025-08-02 PROCEDURE — 82728 ASSAY OF FERRITIN: CPT

## 2025-08-04 RX ORDER — LEVOTHYROXINE SODIUM 125 UG/1
125 TABLET ORAL DAILY
Qty: 90 TABLET | Refills: 1 | Status: SHIPPED | OUTPATIENT
Start: 2025-08-04

## 2025-08-19 ENCOUNTER — HOSPITAL ENCOUNTER (OUTPATIENT)
Dept: RADIOLOGY | Facility: HOSPITAL | Age: 40
Discharge: HOME | End: 2025-08-19
Attending: PHYSICIAN ASSISTANT
Payer: COMMERCIAL

## 2025-08-19 DIAGNOSIS — E04.2 MULTIPLE THYROID NODULES: ICD-10-CM

## 2025-08-19 DIAGNOSIS — E06.3 HYPOTHYROIDISM DUE TO HASHIMOTO'S THYROIDITIS: ICD-10-CM

## 2025-08-19 PROCEDURE — 76536 US EXAM OF HEAD AND NECK: CPT
